# Patient Record
Sex: FEMALE | Race: OTHER | HISPANIC OR LATINO
[De-identification: names, ages, dates, MRNs, and addresses within clinical notes are randomized per-mention and may not be internally consistent; named-entity substitution may affect disease eponyms.]

---

## 2018-08-21 ENCOUNTER — APPOINTMENT (OUTPATIENT)
Dept: OBGYN | Facility: CLINIC | Age: 43
End: 2018-08-21
Payer: COMMERCIAL

## 2018-08-21 VITALS
BODY MASS INDEX: 43.02 KG/M2 | DIASTOLIC BLOOD PRESSURE: 85 MMHG | SYSTOLIC BLOOD PRESSURE: 141 MMHG | HEIGHT: 64 IN | WEIGHT: 252 LBS

## 2018-08-21 DIAGNOSIS — Z78.9 OTHER SPECIFIED HEALTH STATUS: ICD-10-CM

## 2018-08-21 PROBLEM — Z00.00 ENCOUNTER FOR PREVENTIVE HEALTH EXAMINATION: Status: ACTIVE | Noted: 2018-08-21

## 2018-08-21 PROCEDURE — 99244 OFF/OP CNSLTJ NEW/EST MOD 40: CPT

## 2018-10-02 ENCOUNTER — EMERGENCY (EMERGENCY)
Facility: HOSPITAL | Age: 43
LOS: 1 days | Discharge: HOME | End: 2018-10-02
Attending: EMERGENCY MEDICINE | Admitting: EMERGENCY MEDICINE
Payer: COMMERCIAL

## 2018-10-02 VITALS
HEART RATE: 99 BPM | DIASTOLIC BLOOD PRESSURE: 69 MMHG | OXYGEN SATURATION: 97 % | RESPIRATION RATE: 20 BRPM | TEMPERATURE: 98 F | SYSTOLIC BLOOD PRESSURE: 138 MMHG | WEIGHT: 251.99 LBS | HEIGHT: 64 IN

## 2018-10-02 VITALS
RESPIRATION RATE: 19 BRPM | OXYGEN SATURATION: 97 % | DIASTOLIC BLOOD PRESSURE: 78 MMHG | SYSTOLIC BLOOD PRESSURE: 130 MMHG | HEART RATE: 95 BPM

## 2018-10-02 DIAGNOSIS — M25.531 PAIN IN RIGHT WRIST: ICD-10-CM

## 2018-10-02 DIAGNOSIS — O99.89 OTHER SPECIFIED DISEASES AND CONDITIONS COMPLICATING PREGNANCY, CHILDBIRTH AND THE PUERPERIUM: ICD-10-CM

## 2018-10-02 DIAGNOSIS — Z79.4 LONG TERM (CURRENT) USE OF INSULIN: ICD-10-CM

## 2018-10-02 DIAGNOSIS — E11.9 TYPE 2 DIABETES MELLITUS WITHOUT COMPLICATIONS: ICD-10-CM

## 2018-10-02 DIAGNOSIS — M25.532 PAIN IN LEFT WRIST: ICD-10-CM

## 2018-10-02 DIAGNOSIS — E03.9 HYPOTHYROIDISM, UNSPECIFIED: ICD-10-CM

## 2018-10-02 DIAGNOSIS — Z3A.17 17 WEEKS GESTATION OF PREGNANCY: ICD-10-CM

## 2018-10-02 DIAGNOSIS — Z79.899 OTHER LONG TERM (CURRENT) DRUG THERAPY: ICD-10-CM

## 2018-10-02 PROCEDURE — 29125 APPL SHORT ARM SPLINT STATIC: CPT | Mod: LT

## 2018-10-02 PROCEDURE — 99282 EMERGENCY DEPT VISIT SF MDM: CPT

## 2018-10-02 PROCEDURE — 99284 EMERGENCY DEPT VISIT MOD MDM: CPT | Mod: 25

## 2018-10-02 NOTE — ED ADULT NURSE NOTE - NSIMPLEMENTINTERV_GEN_ALL_ED
Implemented All Universal Safety Interventions:  [place] to call system. Call bell, personal items and telephone within reach. Instruct patient to call for assistance. Room bathroom lighting operational. Non-slip footwear when patient is off stretcher. Physically safe environment: no spills, clutter or unnecessary equipment. Stretcher in lowest position, wheels locked, appropriate side rails in place.

## 2018-10-02 NOTE — ED ADULT NURSE REASSESSMENT NOTE - NS ED NURSE REASSESS COMMENT FT1
thumb spica applied to L arm and instructed to put the other (R) at hoem due to difficulty moving out if the R if put in placed. instructed about the application.

## 2018-10-02 NOTE — CONSULT NOTE ADULT - ASSESSMENT
42yo  at 17w with bilateral wrist pain. No obstetric issues at this time. Pt seen by hand surgeon who recommends kenalog and lidocaine injection. Kenalog is rated category D in first trimester due to increased risk for cleft lip development. However, is safe during this gestational age. Safe for discharge at this time from obstetrical standpoint.     Discussed with Dr. Houser.

## 2018-10-02 NOTE — CONSULT NOTE ADULT - SUBJECTIVE AND OBJECTIVE BOX
42yo  at 17 weeks with pregnancy c/b pregestational diabetes on insulin and hypothyroidism presenting to ED with bilateral wrist pain. Patient presented to ED after being seen by [place] MD and recommended to be seen in ED. Pain with movement. No precipitating symptoms. Denies VB, LOF, ctx.     On Levemir 62 AM/PM; humalog 30 TID with meals.  OBHX denies  PMH: hypothyroidism on synthroid 188mcg   PSH: open appendectomy at 17 years old   Meds: synthroid, insulin  All: NKDA    Vital Signs Last 24 Hrs  T(C): 36.9 (02 Oct 2018 20:58), Max: 36.9 (02 Oct 2018 20:36)  T(F): 98.5 (02 Oct 2018 20:58), Max: 98.5 (02 Oct 2018 20:58)  HR: 98 (02 Oct 2018 20:58) (98 - 99)  BP: 128/74 (02 Oct 2018 20:58) (128/74 - 138/69)  BP(mean): --  RR: 20 (02 Oct 2018 20:58) (20 - 20)  SpO2: 97% (02 Oct 2018 20:58) (97% - 97%)    Gen: resting comfortably   Ext: wrist tender to palpation and ROM   Abd: soft, nontender  TAUS: FHR 140bpm

## 2018-10-02 NOTE — ED PROVIDER NOTE - PHYSICAL EXAMINATION
bilateral + Finkelstein's tests no skin defect no warmth or erythema Compartments soft RUM nerves intact M/S cap refill brisk pulses intact

## 2018-10-02 NOTE — ED PROVIDER NOTE - MEDICAL DECISION MAKING DETAILS
gyn apprised + bilateral R>L DeQuervain's noted gyn apprised and with patient in ED + bilateral R>L DeQuervain's noted and Hand Sx with patient in ED

## 2018-10-02 NOTE — ED ADULT NURSE NOTE - CHIEF COMPLAINT QUOTE
RT wrist pain for 3 weeks , swelling noted for 3 days on both wrist. pt is 17 weeks pregnant . was referred From [facility] for evaluation. .pt denies trauma ,no injury,

## 2018-10-02 NOTE — ED PROVIDER NOTE - ATTENDING CONTRIBUTION TO CARE
I have seen and examined the pt, reviewed all pertinent clinical data, and I agree with the documentation/care/plan executed by MARYJO Mortensen.

## 2018-10-02 NOTE — ED ADULT TRIAGE NOTE - CHIEF COMPLAINT QUOTE
RT wrist pain for 3 weeks , swelling noted for 3 days on both wrist. pt is 17 weeks pregnant . was referred From [facility] for evaluation. .pt denies trauma ,no injury,

## 2018-10-02 NOTE — ED ADULT NURSE NOTE - OBJECTIVE STATEMENT
pt came from [facility] and sent here for further evaluation. R wrist x 3 weeks pain and L wrist x 2 days no redness but painful accdg to the pt. 17 weeks AOG . on insulin

## 2018-12-07 ENCOUNTER — APPOINTMENT (OUTPATIENT)
Dept: OBGYN | Facility: CLINIC | Age: 43
End: 2018-12-07
Payer: COMMERCIAL

## 2018-12-07 PROCEDURE — 99213 OFFICE O/P EST LOW 20 MIN: CPT

## 2018-12-09 ENCOUNTER — OUTPATIENT (OUTPATIENT)
Dept: OUTPATIENT SERVICES | Facility: HOSPITAL | Age: 43
LOS: 1 days | End: 2018-12-09
Payer: COMMERCIAL

## 2018-12-09 DIAGNOSIS — O26.899 OTHER SPECIFIED PREGNANCY RELATED CONDITIONS, UNSPECIFIED TRIMESTER: ICD-10-CM

## 2018-12-09 DIAGNOSIS — Z3A.00 WEEKS OF GESTATION OF PREGNANCY NOT SPECIFIED: ICD-10-CM

## 2018-12-09 PROBLEM — E03.9 HYPOTHYROIDISM, UNSPECIFIED: Chronic | Status: ACTIVE | Noted: 2018-10-02

## 2018-12-09 PROBLEM — O24.919 UNSPECIFIED DIABETES MELLITUS IN PREGNANCY, UNSPECIFIED TRIMESTER: Chronic | Status: ACTIVE | Noted: 2018-10-02

## 2018-12-09 LAB
ALBUMIN SERPL ELPH-MCNC: 3.6 G/DL — SIGNIFICANT CHANGE UP (ref 3.3–5)
ALP SERPL-CCNC: 32 U/L — LOW (ref 40–120)
ALT FLD-CCNC: 12 U/L — SIGNIFICANT CHANGE UP (ref 10–45)
ANION GAP SERPL CALC-SCNC: 14 MMOL/L — SIGNIFICANT CHANGE UP (ref 5–17)
APPEARANCE UR: CLEAR — SIGNIFICANT CHANGE UP
APTT BLD: 26.1 SEC — LOW (ref 27.5–36.3)
AST SERPL-CCNC: 14 U/L — SIGNIFICANT CHANGE UP (ref 10–40)
BASOPHILS NFR BLD AUTO: 0.2 % — SIGNIFICANT CHANGE UP (ref 0–2)
BILIRUB SERPL-MCNC: <0.2 MG/DL — SIGNIFICANT CHANGE UP (ref 0.2–1.2)
BILIRUB UR-MCNC: NEGATIVE — SIGNIFICANT CHANGE UP
BUN SERPL-MCNC: 9 MG/DL — SIGNIFICANT CHANGE UP (ref 7–23)
CALCIUM SERPL-MCNC: 9.1 MG/DL — SIGNIFICANT CHANGE UP (ref 8.4–10.5)
CHLORIDE SERPL-SCNC: 105 MMOL/L — SIGNIFICANT CHANGE UP (ref 96–108)
CO2 SERPL-SCNC: 22 MMOL/L — SIGNIFICANT CHANGE UP (ref 22–31)
COLLECT DURATION TIME UR: 24 HR — SIGNIFICANT CHANGE UP
COLOR SPEC: YELLOW — SIGNIFICANT CHANGE UP
CREAT SERPL-MCNC: 0.48 MG/DL — LOW (ref 0.5–1.3)
DIFF PNL FLD: NEGATIVE — SIGNIFICANT CHANGE UP
EOSINOPHIL NFR BLD AUTO: 1.3 % — SIGNIFICANT CHANGE UP (ref 0–6)
FIBRINOGEN PPP-MCNC: 485 MG/DL — HIGH (ref 258–438)
GLUCOSE BLDC GLUCOMTR-MCNC: 150 MG/DL — HIGH (ref 70–99)
GLUCOSE BLDC GLUCOMTR-MCNC: 60 MG/DL — LOW (ref 70–99)
GLUCOSE SERPL-MCNC: 72 MG/DL — SIGNIFICANT CHANGE UP (ref 70–99)
GLUCOSE UR QL: NEGATIVE — SIGNIFICANT CHANGE UP
HCT VFR BLD CALC: 33.4 % — LOW (ref 34.5–45)
HGB BLD-MCNC: 10.8 G/DL — LOW (ref 11.5–15.5)
INR BLD: 1.06 — SIGNIFICANT CHANGE UP (ref 0.88–1.16)
KETONES UR-MCNC: ABNORMAL MG/DL
LDH SERPL L TO P-CCNC: 170 U/L — SIGNIFICANT CHANGE UP (ref 50–242)
LEUKOCYTE ESTERASE UR-ACNC: ABNORMAL
LYMPHOCYTES # BLD AUTO: 18 % — SIGNIFICANT CHANGE UP (ref 13–44)
MCHC RBC-ENTMCNC: 28.9 PG — SIGNIFICANT CHANGE UP (ref 27–34)
MCHC RBC-ENTMCNC: 32.3 G/DL — SIGNIFICANT CHANGE UP (ref 32–36)
MCV RBC AUTO: 89.3 FL — SIGNIFICANT CHANGE UP (ref 80–100)
MONOCYTES NFR BLD AUTO: 8.2 % — SIGNIFICANT CHANGE UP (ref 2–14)
NEUTROPHILS NFR BLD AUTO: 72.3 % — SIGNIFICANT CHANGE UP (ref 43–77)
NITRITE UR-MCNC: NEGATIVE — SIGNIFICANT CHANGE UP
PH UR: 6 — SIGNIFICANT CHANGE UP (ref 5–8)
PLATELET # BLD AUTO: 239 K/UL — SIGNIFICANT CHANGE UP (ref 150–400)
POTASSIUM SERPL-MCNC: 4 MMOL/L — SIGNIFICANT CHANGE UP (ref 3.5–5.3)
POTASSIUM SERPL-SCNC: 4 MMOL/L — SIGNIFICANT CHANGE UP (ref 3.5–5.3)
PROT 24H UR-MRATE: 167 MG/24 H — HIGH (ref 50–100)
PROT SERPL-MCNC: 6.7 G/DL — SIGNIFICANT CHANGE UP (ref 6–8.3)
PROT UR-MCNC: NEGATIVE MG/DL — SIGNIFICANT CHANGE UP
PROTHROM AB SERPL-ACNC: 12 SEC — SIGNIFICANT CHANGE UP (ref 10–12.9)
RBC # BLD: 3.74 M/UL — LOW (ref 3.8–5.2)
RBC # FLD: 14.1 % — SIGNIFICANT CHANGE UP (ref 10.3–16.9)
SODIUM SERPL-SCNC: 141 MMOL/L — SIGNIFICANT CHANGE UP (ref 135–145)
SP GR SPEC: >=1.03 — SIGNIFICANT CHANGE UP (ref 1–1.03)
TOTAL VOLUME - 24 HOUR: 980 ML — SIGNIFICANT CHANGE UP
URATE SERPL-MCNC: 3.9 MG/DL — SIGNIFICANT CHANGE UP (ref 2.5–7)
URINE CREATININE CALCULATION: 1.4 G/24 H — SIGNIFICANT CHANGE UP (ref 0.8–1.8)
UROBILINOGEN FLD QL: 0.2 E.U./DL — SIGNIFICANT CHANGE UP
WBC # BLD: 12.7 K/UL — HIGH (ref 3.8–10.5)
WBC # FLD AUTO: 12.7 K/UL — HIGH (ref 3.8–10.5)

## 2018-12-09 PROCEDURE — 36415 COLL VENOUS BLD VENIPUNCTURE: CPT

## 2018-12-09 PROCEDURE — 99214 OFFICE O/P EST MOD 30 MIN: CPT

## 2018-12-09 PROCEDURE — 85025 COMPLETE CBC W/AUTO DIFF WBC: CPT

## 2018-12-09 PROCEDURE — 94760 N-INVAS EAR/PLS OXIMETRY 1: CPT

## 2018-12-09 PROCEDURE — 80053 COMPREHEN METABOLIC PANEL: CPT

## 2018-12-09 PROCEDURE — 76805 OB US >/= 14 WKS SNGL FETUS: CPT

## 2018-12-09 PROCEDURE — 84550 ASSAY OF BLOOD/URIC ACID: CPT

## 2018-12-09 PROCEDURE — 99232 SBSQ HOSP IP/OBS MODERATE 35: CPT

## 2018-12-09 PROCEDURE — 85610 PROTHROMBIN TIME: CPT

## 2018-12-09 PROCEDURE — 81001 URINALYSIS AUTO W/SCOPE: CPT

## 2018-12-09 PROCEDURE — 85384 FIBRINOGEN ACTIVITY: CPT

## 2018-12-09 PROCEDURE — 82962 GLUCOSE BLOOD TEST: CPT

## 2018-12-09 PROCEDURE — 59025 FETAL NON-STRESS TEST: CPT

## 2018-12-09 PROCEDURE — 85730 THROMBOPLASTIN TIME PARTIAL: CPT

## 2018-12-09 PROCEDURE — 83615 LACTATE (LD) (LDH) ENZYME: CPT

## 2018-12-09 PROCEDURE — 84156 ASSAY OF PROTEIN URINE: CPT

## 2018-12-11 DIAGNOSIS — O09.512 SUPERVISION OF ELDERLY PRIMIGRAVIDA, SECOND TRIMESTER: ICD-10-CM

## 2018-12-26 ENCOUNTER — APPOINTMENT (OUTPATIENT)
Dept: OBGYN | Facility: CLINIC | Age: 43
End: 2018-12-26
Payer: COMMERCIAL

## 2018-12-26 ENCOUNTER — APPOINTMENT (OUTPATIENT)
Dept: OBGYN | Facility: CLINIC | Age: 43
End: 2018-12-26

## 2018-12-26 PROCEDURE — 99213 OFFICE O/P EST LOW 20 MIN: CPT

## 2019-01-11 ENCOUNTER — APPOINTMENT (OUTPATIENT)
Dept: OBGYN | Facility: CLINIC | Age: 44
End: 2019-01-11
Payer: COMMERCIAL

## 2019-01-11 PROCEDURE — 99213 OFFICE O/P EST LOW 20 MIN: CPT

## 2019-02-15 ENCOUNTER — APPOINTMENT (OUTPATIENT)
Dept: OBGYN | Facility: CLINIC | Age: 44
End: 2019-02-15
Payer: COMMERCIAL

## 2019-02-15 VITALS
BODY MASS INDEX: 50.02 KG/M2 | HEIGHT: 64 IN | WEIGHT: 293 LBS | DIASTOLIC BLOOD PRESSURE: 76 MMHG | SYSTOLIC BLOOD PRESSURE: 144 MMHG

## 2019-02-15 PROCEDURE — 0502F SUBSEQUENT PRENATAL CARE: CPT

## 2019-02-22 ENCOUNTER — RESULT REVIEW (OUTPATIENT)
Age: 44
End: 2019-02-22

## 2019-02-22 ENCOUNTER — INPATIENT (INPATIENT)
Facility: HOSPITAL | Age: 44
LOS: 3 days | Discharge: HOME | End: 2019-02-26
Attending: OBSTETRICS & GYNECOLOGY | Admitting: OBSTETRICS & GYNECOLOGY
Payer: COMMERCIAL

## 2019-02-22 ENCOUNTER — APPOINTMENT (OUTPATIENT)
Dept: OBGYN | Facility: CLINIC | Age: 44
End: 2019-02-22

## 2019-02-22 VITALS — HEIGHT: 64 IN | WEIGHT: 293 LBS

## 2019-02-22 DIAGNOSIS — Z3A.00 WEEKS OF GESTATION OF PREGNANCY NOT SPECIFIED: ICD-10-CM

## 2019-02-22 DIAGNOSIS — O26.899 OTHER SPECIFIED PREGNANCY RELATED CONDITIONS, UNSPECIFIED TRIMESTER: ICD-10-CM

## 2019-02-22 LAB
ALBUMIN SERPL ELPH-MCNC: 3.3 G/DL — SIGNIFICANT CHANGE UP (ref 3.3–5)
ALP SERPL-CCNC: 67 U/L — SIGNIFICANT CHANGE UP (ref 40–120)
ALT FLD-CCNC: 22 U/L — SIGNIFICANT CHANGE UP (ref 10–45)
ANION GAP SERPL CALC-SCNC: 10 MMOL/L — SIGNIFICANT CHANGE UP (ref 5–17)
APPEARANCE UR: CLEAR — SIGNIFICANT CHANGE UP
APPEARANCE UR: CLEAR — SIGNIFICANT CHANGE UP
APTT BLD: 24.8 SEC — LOW (ref 27.5–36.3)
AST SERPL-CCNC: 21 U/L — SIGNIFICANT CHANGE UP (ref 10–40)
BACTERIA # UR AUTO: PRESENT /HPF
BASOPHILS # BLD AUTO: 0.03 K/UL — SIGNIFICANT CHANGE UP (ref 0–0.2)
BASOPHILS # BLD AUTO: 0.03 K/UL — SIGNIFICANT CHANGE UP (ref 0–0.2)
BASOPHILS NFR BLD AUTO: 0.2 % — SIGNIFICANT CHANGE UP (ref 0–2)
BASOPHILS NFR BLD AUTO: 0.2 % — SIGNIFICANT CHANGE UP (ref 0–2)
BILIRUB SERPL-MCNC: 0.2 MG/DL — SIGNIFICANT CHANGE UP (ref 0.2–1.2)
BILIRUB UR-MCNC: NEGATIVE — SIGNIFICANT CHANGE UP
BILIRUB UR-MCNC: NEGATIVE — SIGNIFICANT CHANGE UP
BLD GP AB SCN SERPL QL: NEGATIVE — SIGNIFICANT CHANGE UP
BUN SERPL-MCNC: 6 MG/DL — LOW (ref 7–23)
CALCIUM SERPL-MCNC: 9.2 MG/DL — SIGNIFICANT CHANGE UP (ref 8.4–10.5)
CHLORIDE SERPL-SCNC: 103 MMOL/L — SIGNIFICANT CHANGE UP (ref 96–108)
CO2 SERPL-SCNC: 25 MMOL/L — SIGNIFICANT CHANGE UP (ref 22–31)
COLOR SPEC: YELLOW — SIGNIFICANT CHANGE UP
COLOR SPEC: YELLOW — SIGNIFICANT CHANGE UP
CREAT ?TM UR-MCNC: 84 MG/DL — SIGNIFICANT CHANGE UP
CREAT SERPL-MCNC: 0.53 MG/DL — SIGNIFICANT CHANGE UP (ref 0.5–1.3)
DIFF PNL FLD: ABNORMAL
DIFF PNL FLD: ABNORMAL
EOSINOPHIL # BLD AUTO: 0.03 K/UL — SIGNIFICANT CHANGE UP (ref 0–0.5)
EOSINOPHIL # BLD AUTO: 0.13 K/UL — SIGNIFICANT CHANGE UP (ref 0–0.5)
EOSINOPHIL NFR BLD AUTO: 0.2 % — SIGNIFICANT CHANGE UP (ref 0–6)
EOSINOPHIL NFR BLD AUTO: 0.9 % — SIGNIFICANT CHANGE UP (ref 0–6)
EPI CELLS # UR: SIGNIFICANT CHANGE UP /HPF (ref 0–5)
FIBRINOGEN PPP-MCNC: 600 MG/DL — HIGH (ref 258–438)
GLUCOSE BLDC GLUCOMTR-MCNC: 113 MG/DL — HIGH (ref 70–99)
GLUCOSE BLDC GLUCOMTR-MCNC: 123 MG/DL — HIGH (ref 70–99)
GLUCOSE BLDC GLUCOMTR-MCNC: 127 MG/DL — HIGH (ref 70–99)
GLUCOSE BLDC GLUCOMTR-MCNC: 91 MG/DL — SIGNIFICANT CHANGE UP (ref 70–99)
GLUCOSE SERPL-MCNC: 120 MG/DL — HIGH (ref 70–99)
GLUCOSE UR QL: NEGATIVE — SIGNIFICANT CHANGE UP
GLUCOSE UR QL: NEGATIVE — SIGNIFICANT CHANGE UP
HBV SURFACE AG SER-ACNC: SIGNIFICANT CHANGE UP
HCT VFR BLD CALC: 35.4 % — SIGNIFICANT CHANGE UP (ref 34.5–45)
HCT VFR BLD CALC: 37.6 % — SIGNIFICANT CHANGE UP (ref 34.5–45)
HGB BLD-MCNC: 11.3 G/DL — LOW (ref 11.5–15.5)
HGB BLD-MCNC: 12.1 G/DL — SIGNIFICANT CHANGE UP (ref 11.5–15.5)
HIV 1+2 AB+HIV1 P24 AG SERPL QL IA: SIGNIFICANT CHANGE UP
HYALINE CASTS # UR AUTO: ABNORMAL /LPF (ref 0–2)
IMM GRANULOCYTES NFR BLD AUTO: 0.5 % — SIGNIFICANT CHANGE UP (ref 0–1.5)
IMM GRANULOCYTES NFR BLD AUTO: 0.6 % — SIGNIFICANT CHANGE UP (ref 0–1.5)
INR BLD: 0.98 — SIGNIFICANT CHANGE UP (ref 0.88–1.16)
KETONES UR-MCNC: 40 MG/DL
KETONES UR-MCNC: NEGATIVE — SIGNIFICANT CHANGE UP
LDH SERPL L TO P-CCNC: 256 U/L — HIGH (ref 50–242)
LEUKOCYTE ESTERASE UR-ACNC: NEGATIVE — SIGNIFICANT CHANGE UP
LEUKOCYTE ESTERASE UR-ACNC: NEGATIVE — SIGNIFICANT CHANGE UP
LYMPHOCYTES # BLD AUTO: 1.69 K/UL — SIGNIFICANT CHANGE UP (ref 1–3.3)
LYMPHOCYTES # BLD AUTO: 11 % — LOW (ref 13–44)
LYMPHOCYTES # BLD AUTO: 17.1 % — SIGNIFICANT CHANGE UP (ref 13–44)
LYMPHOCYTES # BLD AUTO: 2.39 K/UL — SIGNIFICANT CHANGE UP (ref 1–3.3)
MCHC RBC-ENTMCNC: 28.8 PG — SIGNIFICANT CHANGE UP (ref 27–34)
MCHC RBC-ENTMCNC: 28.8 PG — SIGNIFICANT CHANGE UP (ref 27–34)
MCHC RBC-ENTMCNC: 31.9 GM/DL — LOW (ref 32–36)
MCHC RBC-ENTMCNC: 32.2 GM/DL — SIGNIFICANT CHANGE UP (ref 32–36)
MCV RBC AUTO: 89.5 FL — SIGNIFICANT CHANGE UP (ref 80–100)
MCV RBC AUTO: 90.1 FL — SIGNIFICANT CHANGE UP (ref 80–100)
MONOCYTES # BLD AUTO: 1.2 K/UL — HIGH (ref 0–0.9)
MONOCYTES # BLD AUTO: 1.31 K/UL — HIGH (ref 0–0.9)
MONOCYTES NFR BLD AUTO: 7.8 % — SIGNIFICANT CHANGE UP (ref 2–14)
MONOCYTES NFR BLD AUTO: 9.4 % — SIGNIFICANT CHANGE UP (ref 2–14)
NEUTROPHILS # BLD AUTO: 10.03 K/UL — HIGH (ref 1.8–7.4)
NEUTROPHILS # BLD AUTO: 12.31 K/UL — HIGH (ref 1.8–7.4)
NEUTROPHILS NFR BLD AUTO: 71.8 % — SIGNIFICANT CHANGE UP (ref 43–77)
NEUTROPHILS NFR BLD AUTO: 80.3 % — HIGH (ref 43–77)
NITRITE UR-MCNC: NEGATIVE — SIGNIFICANT CHANGE UP
NITRITE UR-MCNC: NEGATIVE — SIGNIFICANT CHANGE UP
NRBC # BLD: 0 /100 WBCS — SIGNIFICANT CHANGE UP (ref 0–0)
NRBC # BLD: 0 /100 WBCS — SIGNIFICANT CHANGE UP (ref 0–0)
PH UR: 6.5 — SIGNIFICANT CHANGE UP (ref 5–8)
PH UR: 6.5 — SIGNIFICANT CHANGE UP (ref 5–8)
PLATELET # BLD AUTO: 214 K/UL — SIGNIFICANT CHANGE UP (ref 150–400)
PLATELET # BLD AUTO: 243 K/UL — SIGNIFICANT CHANGE UP (ref 150–400)
POTASSIUM SERPL-MCNC: 3.6 MMOL/L — SIGNIFICANT CHANGE UP (ref 3.5–5.3)
POTASSIUM SERPL-SCNC: 3.6 MMOL/L — SIGNIFICANT CHANGE UP (ref 3.5–5.3)
PROT ?TM UR-MCNC: 153 MG/DL — HIGH (ref 0–12)
PROT SERPL-MCNC: 7.1 G/DL — SIGNIFICANT CHANGE UP (ref 6–8.3)
PROT UR-MCNC: 100 MG/DL
PROT UR-MCNC: NEGATIVE MG/DL — SIGNIFICANT CHANGE UP
PROT/CREAT UR-RTO: 1.8 RATIO — HIGH (ref 0–0.2)
PROTHROM AB SERPL-ACNC: 11.1 SEC — SIGNIFICANT CHANGE UP (ref 10–12.9)
RBC # BLD: 3.93 M/UL — SIGNIFICANT CHANGE UP (ref 3.8–5.2)
RBC # BLD: 4.2 M/UL — SIGNIFICANT CHANGE UP (ref 3.8–5.2)
RBC # FLD: 14.4 % — SIGNIFICANT CHANGE UP (ref 10.3–14.5)
RBC # FLD: 14.4 % — SIGNIFICANT CHANGE UP (ref 10.3–14.5)
RBC CASTS # UR COMP ASSIST: > 10 /HPF
RH IG SCN BLD-IMP: POSITIVE — SIGNIFICANT CHANGE UP
SODIUM SERPL-SCNC: 138 MMOL/L — SIGNIFICANT CHANGE UP (ref 135–145)
SP GR SPEC: 1.02 — SIGNIFICANT CHANGE UP (ref 1–1.03)
SP GR SPEC: 1.02 — SIGNIFICANT CHANGE UP (ref 1–1.03)
URATE SERPL-MCNC: 3.6 MG/DL — SIGNIFICANT CHANGE UP (ref 2.5–7)
UROBILINOGEN FLD QL: 0.2 E.U./DL — SIGNIFICANT CHANGE UP
UROBILINOGEN FLD QL: 0.2 E.U./DL — SIGNIFICANT CHANGE UP
WBC # BLD: 13.97 K/UL — HIGH (ref 3.8–10.5)
WBC # BLD: 15.34 K/UL — HIGH (ref 3.8–10.5)
WBC # FLD AUTO: 13.97 K/UL — HIGH (ref 3.8–10.5)
WBC # FLD AUTO: 15.34 K/UL — HIGH (ref 3.8–10.5)
WBC UR QL: < 5 /HPF — SIGNIFICANT CHANGE UP

## 2019-02-22 PROCEDURE — 59514 CESAREAN DELIVERY ONLY: CPT | Mod: U9

## 2019-02-22 PROCEDURE — 99232 SBSQ HOSP IP/OBS MODERATE 35: CPT

## 2019-02-22 RX ORDER — SODIUM CHLORIDE 9 MG/ML
1000 INJECTION, SOLUTION INTRAVENOUS ONCE
Qty: 0 | Refills: 0 | Status: DISCONTINUED | OUTPATIENT
Start: 2019-02-22 | End: 2019-02-22

## 2019-02-22 RX ORDER — OXYTOCIN 10 UNIT/ML
333.33 VIAL (ML) INJECTION
Qty: 20 | Refills: 0 | Status: DISCONTINUED | OUTPATIENT
Start: 2019-02-22 | End: 2019-02-26

## 2019-02-22 RX ORDER — SODIUM CHLORIDE 9 MG/ML
1000 INJECTION INTRAMUSCULAR; INTRAVENOUS; SUBCUTANEOUS
Qty: 0 | Refills: 0 | Status: DISCONTINUED | OUTPATIENT
Start: 2019-02-22 | End: 2019-02-23

## 2019-02-22 RX ORDER — INSULIN LISPRO 100/ML
VIAL (ML) SUBCUTANEOUS
Qty: 0 | Refills: 0 | Status: DISCONTINUED | OUTPATIENT
Start: 2019-02-22 | End: 2019-02-26

## 2019-02-22 RX ORDER — CITRIC ACID/SODIUM CITRATE 300-500 MG
15 SOLUTION, ORAL ORAL ONCE
Qty: 0 | Refills: 0 | Status: DISCONTINUED | OUTPATIENT
Start: 2019-02-22 | End: 2019-02-22

## 2019-02-22 RX ORDER — DEXTROSE 50 % IN WATER 50 %
50 SYRINGE (ML) INTRAVENOUS ONCE
Qty: 0 | Refills: 0 | Status: DISCONTINUED | OUTPATIENT
Start: 2019-02-22 | End: 2019-02-22

## 2019-02-22 RX ORDER — FERROUS SULFATE 325(65) MG
325 TABLET ORAL DAILY
Qty: 0 | Refills: 0 | Status: DISCONTINUED | OUTPATIENT
Start: 2019-02-22 | End: 2019-02-26

## 2019-02-22 RX ORDER — OXYCODONE AND ACETAMINOPHEN 5; 325 MG/1; MG/1
2 TABLET ORAL EVERY 6 HOURS
Qty: 0 | Refills: 0 | Status: DISCONTINUED | OUTPATIENT
Start: 2019-02-22 | End: 2019-02-26

## 2019-02-22 RX ORDER — SODIUM CHLORIDE 9 MG/ML
1000 INJECTION, SOLUTION INTRAVENOUS
Qty: 0 | Refills: 0 | Status: DISCONTINUED | OUTPATIENT
Start: 2019-02-22 | End: 2019-02-23

## 2019-02-22 RX ORDER — HYDRALAZINE HCL 50 MG
10 TABLET ORAL ONCE
Qty: 0 | Refills: 0 | Status: COMPLETED | OUTPATIENT
Start: 2019-02-22 | End: 2019-02-22

## 2019-02-22 RX ORDER — CITRIC ACID/SODIUM CITRATE 300-500 MG
30 SOLUTION, ORAL ORAL ONCE
Qty: 0 | Refills: 0 | Status: COMPLETED | OUTPATIENT
Start: 2019-02-22 | End: 2019-02-22

## 2019-02-22 RX ORDER — TETANUS TOXOID, REDUCED DIPHTHERIA TOXOID AND ACELLULAR PERTUSSIS VACCINE, ADSORBED 5; 2.5; 8; 8; 2.5 [IU]/.5ML; [IU]/.5ML; UG/.5ML; UG/.5ML; UG/.5ML
0.5 SUSPENSION INTRAMUSCULAR ONCE
Qty: 0 | Refills: 0 | Status: COMPLETED | OUTPATIENT
Start: 2019-02-22 | End: 2020-01-21

## 2019-02-22 RX ORDER — MAGNESIUM SULFATE 500 MG/ML
4 VIAL (ML) INJECTION ONCE
Qty: 0 | Refills: 0 | Status: COMPLETED | OUTPATIENT
Start: 2019-02-22 | End: 2019-02-22

## 2019-02-22 RX ORDER — OXYCODONE AND ACETAMINOPHEN 5; 325 MG/1; MG/1
1 TABLET ORAL
Qty: 0 | Refills: 0 | Status: DISCONTINUED | OUTPATIENT
Start: 2019-02-22 | End: 2019-02-26

## 2019-02-22 RX ORDER — HEPARIN SODIUM 5000 [USP'U]/ML
7500 INJECTION INTRAVENOUS; SUBCUTANEOUS EVERY 8 HOURS
Qty: 0 | Refills: 0 | Status: DISCONTINUED | OUTPATIENT
Start: 2019-02-23 | End: 2019-02-26

## 2019-02-22 RX ORDER — IBUPROFEN 200 MG
600 TABLET ORAL EVERY 6 HOURS
Qty: 0 | Refills: 0 | Status: DISCONTINUED | OUTPATIENT
Start: 2019-02-22 | End: 2019-02-26

## 2019-02-22 RX ORDER — SIMETHICONE 80 MG/1
80 TABLET, CHEWABLE ORAL EVERY 4 HOURS
Qty: 0 | Refills: 0 | Status: DISCONTINUED | OUTPATIENT
Start: 2019-02-22 | End: 2019-02-26

## 2019-02-22 RX ORDER — SODIUM CHLORIDE 9 MG/ML
1000 INJECTION, SOLUTION INTRAVENOUS
Qty: 0 | Refills: 0 | Status: DISCONTINUED | OUTPATIENT
Start: 2019-02-22 | End: 2019-02-22

## 2019-02-22 RX ORDER — OXYTOCIN 10 UNIT/ML
333.33 VIAL (ML) INJECTION
Qty: 20 | Refills: 0 | Status: DISCONTINUED | OUTPATIENT
Start: 2019-02-22 | End: 2019-02-22

## 2019-02-22 RX ORDER — METOCLOPRAMIDE HCL 10 MG
10 TABLET ORAL ONCE
Qty: 0 | Refills: 0 | Status: DISCONTINUED | OUTPATIENT
Start: 2019-02-22 | End: 2019-02-22

## 2019-02-22 RX ORDER — DEXTROSE 50 % IN WATER 50 %
15 SYRINGE (ML) INTRAVENOUS ONCE
Qty: 0 | Refills: 0 | Status: DISCONTINUED | OUTPATIENT
Start: 2019-02-22 | End: 2019-02-26

## 2019-02-22 RX ORDER — CEFAZOLIN SODIUM 1 G
3000 VIAL (EA) INJECTION ONCE
Qty: 0 | Refills: 0 | Status: COMPLETED | OUTPATIENT
Start: 2019-02-22 | End: 2019-02-22

## 2019-02-22 RX ORDER — DIPHENHYDRAMINE HCL 50 MG
25 CAPSULE ORAL EVERY 6 HOURS
Qty: 0 | Refills: 0 | Status: DISCONTINUED | OUTPATIENT
Start: 2019-02-22 | End: 2019-02-26

## 2019-02-22 RX ORDER — INSULIN HUMAN 100 [IU]/ML
1 INJECTION, SOLUTION SUBCUTANEOUS
Qty: 50 | Refills: 0 | Status: DISCONTINUED | OUTPATIENT
Start: 2019-02-22 | End: 2019-02-22

## 2019-02-22 RX ORDER — GLYCERIN ADULT
1 SUPPOSITORY, RECTAL RECTAL AT BEDTIME
Qty: 0 | Refills: 0 | Status: DISCONTINUED | OUTPATIENT
Start: 2019-02-22 | End: 2019-02-26

## 2019-02-22 RX ORDER — OXYTOCIN 10 UNIT/ML
41.67 VIAL (ML) INJECTION
Qty: 20 | Refills: 0 | Status: DISCONTINUED | OUTPATIENT
Start: 2019-02-22 | End: 2019-02-26

## 2019-02-22 RX ORDER — DOCUSATE SODIUM 100 MG
100 CAPSULE ORAL
Qty: 0 | Refills: 0 | Status: DISCONTINUED | OUTPATIENT
Start: 2019-02-22 | End: 2019-02-26

## 2019-02-22 RX ORDER — NIFEDIPINE 30 MG
10 TABLET, EXTENDED RELEASE 24 HR ORAL ONCE
Qty: 0 | Refills: 0 | Status: COMPLETED | OUTPATIENT
Start: 2019-02-22 | End: 2019-02-22

## 2019-02-22 RX ORDER — TRANEXAMIC ACID 100 MG/ML
1000 INJECTION, SOLUTION INTRAVENOUS ONCE
Qty: 0 | Refills: 0 | Status: DISCONTINUED | OUTPATIENT
Start: 2019-02-22 | End: 2019-02-22

## 2019-02-22 RX ORDER — LABETALOL HCL 100 MG
20 TABLET ORAL ONCE
Qty: 0 | Refills: 0 | Status: COMPLETED | OUTPATIENT
Start: 2019-02-22 | End: 2019-02-22

## 2019-02-22 RX ORDER — LANOLIN
1 OINTMENT (GRAM) TOPICAL
Qty: 0 | Refills: 0 | Status: DISCONTINUED | OUTPATIENT
Start: 2019-02-22 | End: 2019-02-26

## 2019-02-22 RX ORDER — MAGNESIUM SULFATE 500 MG/ML
2 VIAL (ML) INJECTION ONCE
Qty: 40 | Refills: 0 | Status: COMPLETED | OUTPATIENT
Start: 2019-02-22 | End: 2019-02-22

## 2019-02-22 RX ORDER — ONDANSETRON 8 MG/1
4 TABLET, FILM COATED ORAL EVERY 6 HOURS
Qty: 0 | Refills: 0 | Status: DISCONTINUED | OUTPATIENT
Start: 2019-02-22 | End: 2019-02-26

## 2019-02-22 RX ORDER — NALOXONE HYDROCHLORIDE 4 MG/.1ML
0.1 SPRAY NASAL
Qty: 0 | Refills: 0 | Status: DISCONTINUED | OUTPATIENT
Start: 2019-02-22 | End: 2019-02-26

## 2019-02-22 RX ORDER — MAGNESIUM SULFATE 500 MG/ML
2 VIAL (ML) INJECTION
Qty: 40 | Refills: 0 | Status: DISCONTINUED | OUTPATIENT
Start: 2019-02-22 | End: 2019-02-23

## 2019-02-22 RX ADMIN — Medication 10 MILLIGRAM(S): at 14:30

## 2019-02-22 RX ADMIN — SODIUM CHLORIDE 2000 MILLILITER(S): 9 INJECTION, SOLUTION INTRAVENOUS at 17:29

## 2019-02-22 RX ADMIN — Medication 200 MILLIGRAM(S): at 17:41

## 2019-02-22 RX ADMIN — Medication 10 MILLIGRAM(S): at 16:34

## 2019-02-22 RX ADMIN — Medication 10 MILLIGRAM(S): at 17:05

## 2019-02-22 RX ADMIN — Medication 50 GM/HR: at 16:54

## 2019-02-22 RX ADMIN — Medication 30 MILLILITER(S): at 17:41

## 2019-02-22 RX ADMIN — Medication 300 GRAM(S): at 16:31

## 2019-02-22 RX ADMIN — Medication 20 MILLIGRAM(S): at 23:43

## 2019-02-23 DIAGNOSIS — O14.90 UNSPECIFIED PRE-ECLAMPSIA, UNSPECIFIED TRIMESTER: ICD-10-CM

## 2019-02-23 LAB
ALBUMIN SERPL ELPH-MCNC: 2.7 G/DL — LOW (ref 3.3–5)
ALBUMIN SERPL ELPH-MCNC: 2.9 G/DL — LOW (ref 3.3–5)
ALP SERPL-CCNC: 58 U/L — SIGNIFICANT CHANGE UP (ref 40–120)
ALP SERPL-CCNC: 64 U/L — SIGNIFICANT CHANGE UP (ref 40–120)
ALT FLD-CCNC: 20 U/L — SIGNIFICANT CHANGE UP (ref 10–45)
ALT FLD-CCNC: 20 U/L — SIGNIFICANT CHANGE UP (ref 10–45)
ANION GAP SERPL CALC-SCNC: 11 MMOL/L — SIGNIFICANT CHANGE UP (ref 5–17)
ANION GAP SERPL CALC-SCNC: 12 MMOL/L — SIGNIFICANT CHANGE UP (ref 5–17)
APTT BLD: 24.8 SEC — LOW (ref 27.5–36.3)
APTT BLD: 25.7 SEC — LOW (ref 27.5–36.3)
AST SERPL-CCNC: 30 U/L — SIGNIFICANT CHANGE UP (ref 10–40)
AST SERPL-CCNC: 40 U/L — SIGNIFICANT CHANGE UP (ref 10–40)
BASOPHILS # BLD AUTO: 0 K/UL — SIGNIFICANT CHANGE UP (ref 0–0.2)
BASOPHILS NFR BLD AUTO: 0 % — SIGNIFICANT CHANGE UP (ref 0–2)
BILIRUB SERPL-MCNC: 0.2 MG/DL — SIGNIFICANT CHANGE UP (ref 0.2–1.2)
BILIRUB SERPL-MCNC: 0.2 MG/DL — SIGNIFICANT CHANGE UP (ref 0.2–1.2)
BUN SERPL-MCNC: 5 MG/DL — LOW (ref 7–23)
BUN SERPL-MCNC: 6 MG/DL — LOW (ref 7–23)
CALCIUM SERPL-MCNC: 7.4 MG/DL — LOW (ref 8.4–10.5)
CALCIUM SERPL-MCNC: 7.9 MG/DL — LOW (ref 8.4–10.5)
CHLORIDE SERPL-SCNC: 101 MMOL/L — SIGNIFICANT CHANGE UP (ref 96–108)
CHLORIDE SERPL-SCNC: 104 MMOL/L — SIGNIFICANT CHANGE UP (ref 96–108)
CO2 SERPL-SCNC: 22 MMOL/L — SIGNIFICANT CHANGE UP (ref 22–31)
CO2 SERPL-SCNC: 24 MMOL/L — SIGNIFICANT CHANGE UP (ref 22–31)
CREAT SERPL-MCNC: 0.45 MG/DL — LOW (ref 0.5–1.3)
CREAT SERPL-MCNC: 0.54 MG/DL — SIGNIFICANT CHANGE UP (ref 0.5–1.3)
EOSINOPHIL # BLD AUTO: 0 K/UL — SIGNIFICANT CHANGE UP (ref 0–0.5)
EOSINOPHIL NFR BLD AUTO: 0 % — SIGNIFICANT CHANGE UP (ref 0–6)
FIBRINOGEN PPP-MCNC: 445 MG/DL — HIGH (ref 258–438)
FIBRINOGEN PPP-MCNC: 500 MG/DL — HIGH (ref 258–438)
GLUCOSE BLDC GLUCOMTR-MCNC: 138 MG/DL — HIGH (ref 70–99)
GLUCOSE BLDC GLUCOMTR-MCNC: 141 MG/DL — HIGH (ref 70–99)
GLUCOSE BLDC GLUCOMTR-MCNC: 142 MG/DL — HIGH (ref 70–99)
GLUCOSE BLDC GLUCOMTR-MCNC: 145 MG/DL — HIGH (ref 70–99)
GLUCOSE BLDC GLUCOMTR-MCNC: 145 MG/DL — HIGH (ref 70–99)
GLUCOSE BLDC GLUCOMTR-MCNC: 207 MG/DL — HIGH (ref 70–99)
GLUCOSE SERPL-MCNC: 140 MG/DL — HIGH (ref 70–99)
GLUCOSE SERPL-MCNC: 148 MG/DL — HIGH (ref 70–99)
HCT VFR BLD CALC: 31.6 % — LOW (ref 34.5–45)
HGB BLD-MCNC: 9.9 G/DL — LOW (ref 11.5–15.5)
INR BLD: 0.97 — SIGNIFICANT CHANGE UP (ref 0.88–1.16)
INR BLD: 0.97 — SIGNIFICANT CHANGE UP (ref 0.88–1.16)
LDH SERPL L TO P-CCNC: 358 U/L — HIGH (ref 50–242)
LDH SERPL L TO P-CCNC: 399 U/L — HIGH (ref 50–242)
LYMPHOCYTES # BLD AUTO: 0.97 K/UL — LOW (ref 1–3.3)
LYMPHOCYTES # BLD AUTO: 6.1 % — LOW (ref 13–44)
MAGNESIUM SERPL-MCNC: 3.3 MG/DL — HIGH (ref 1.6–2.6)
MAGNESIUM SERPL-MCNC: 4.2 MG/DL — HIGH (ref 1.6–2.6)
MAGNESIUM SERPL-MCNC: 5.8 MG/DL — HIGH (ref 1.6–2.6)
MAGNESIUM SERPL-MCNC: 5.9 MG/DL — HIGH (ref 1.6–2.6)
MCHC RBC-ENTMCNC: 28.8 PG — SIGNIFICANT CHANGE UP (ref 27–34)
MCHC RBC-ENTMCNC: 31.3 GM/DL — LOW (ref 32–36)
MCV RBC AUTO: 91.9 FL — SIGNIFICANT CHANGE UP (ref 80–100)
MONOCYTES # BLD AUTO: 1.38 K/UL — HIGH (ref 0–0.9)
MONOCYTES NFR BLD AUTO: 8.7 % — SIGNIFICANT CHANGE UP (ref 2–14)
NEUTROPHILS # BLD AUTO: 13.55 K/UL — HIGH (ref 1.8–7.4)
NEUTROPHILS NFR BLD AUTO: 85.2 % — HIGH (ref 43–77)
PLATELET # BLD AUTO: 219 K/UL — SIGNIFICANT CHANGE UP (ref 150–400)
POTASSIUM SERPL-MCNC: 3.4 MMOL/L — LOW (ref 3.5–5.3)
POTASSIUM SERPL-MCNC: 4.1 MMOL/L — SIGNIFICANT CHANGE UP (ref 3.5–5.3)
POTASSIUM SERPL-SCNC: 3.4 MMOL/L — LOW (ref 3.5–5.3)
POTASSIUM SERPL-SCNC: 4.1 MMOL/L — SIGNIFICANT CHANGE UP (ref 3.5–5.3)
PROT SERPL-MCNC: 5.6 G/DL — LOW (ref 6–8.3)
PROT SERPL-MCNC: 5.9 G/DL — LOW (ref 6–8.3)
PROTHROM AB SERPL-ACNC: 10.9 SEC — SIGNIFICANT CHANGE UP (ref 10–12.9)
PROTHROM AB SERPL-ACNC: 10.9 SEC — SIGNIFICANT CHANGE UP (ref 10–12.9)
RBC # BLD: 3.44 M/UL — LOW (ref 3.8–5.2)
RBC # FLD: 14.8 % — HIGH (ref 10.3–14.5)
SODIUM SERPL-SCNC: 136 MMOL/L — SIGNIFICANT CHANGE UP (ref 135–145)
SODIUM SERPL-SCNC: 138 MMOL/L — SIGNIFICANT CHANGE UP (ref 135–145)
T PALLIDUM AB TITR SER: NEGATIVE — SIGNIFICANT CHANGE UP
URATE SERPL-MCNC: 3.6 MG/DL — SIGNIFICANT CHANGE UP (ref 2.5–7)
URATE SERPL-MCNC: 4.1 MG/DL — SIGNIFICANT CHANGE UP (ref 2.5–7)
WBC # BLD: 15.9 K/UL — HIGH (ref 3.8–10.5)
WBC # FLD AUTO: 15.9 K/UL — HIGH (ref 3.8–10.5)

## 2019-02-23 PROCEDURE — 93306 TTE W/DOPPLER COMPLETE: CPT | Mod: 26

## 2019-02-23 PROCEDURE — 71045 X-RAY EXAM CHEST 1 VIEW: CPT | Mod: 26

## 2019-02-23 PROCEDURE — 99221 1ST HOSP IP/OBS SF/LOW 40: CPT

## 2019-02-23 RX ORDER — LABETALOL HCL 100 MG
40 TABLET ORAL ONCE
Qty: 0 | Refills: 0 | Status: COMPLETED | OUTPATIENT
Start: 2019-02-23 | End: 2019-02-23

## 2019-02-23 RX ORDER — LEVOTHYROXINE SODIUM 125 MCG
175 TABLET ORAL DAILY
Qty: 0 | Refills: 0 | Status: DISCONTINUED | OUTPATIENT
Start: 2019-02-23 | End: 2019-02-26

## 2019-02-23 RX ORDER — ACETAMINOPHEN 500 MG
1000 TABLET ORAL ONCE
Qty: 0 | Refills: 0 | Status: COMPLETED | OUTPATIENT
Start: 2019-02-23 | End: 2019-02-23

## 2019-02-23 RX ORDER — LABETALOL HCL 100 MG
200 TABLET ORAL
Qty: 0 | Refills: 0 | Status: DISCONTINUED | OUTPATIENT
Start: 2019-02-23 | End: 2019-02-23

## 2019-02-23 RX ORDER — MAGNESIUM SULFATE 500 MG/ML
2.5 VIAL (ML) INJECTION
Qty: 40 | Refills: 0 | Status: DISCONTINUED | OUTPATIENT
Start: 2019-02-23 | End: 2019-02-23

## 2019-02-23 RX ORDER — LABETALOL HCL 100 MG
300 TABLET ORAL THREE TIMES A DAY
Qty: 0 | Refills: 0 | Status: DISCONTINUED | OUTPATIENT
Start: 2019-02-23 | End: 2019-02-24

## 2019-02-23 RX ORDER — NIFEDIPINE 30 MG
10 TABLET, EXTENDED RELEASE 24 HR ORAL ONCE
Qty: 0 | Refills: 0 | Status: COMPLETED | OUTPATIENT
Start: 2019-02-23 | End: 2019-02-23

## 2019-02-23 RX ORDER — LABETALOL HCL 100 MG
80 TABLET ORAL ONCE
Qty: 0 | Refills: 0 | Status: COMPLETED | OUTPATIENT
Start: 2019-02-23 | End: 2019-02-23

## 2019-02-23 RX ADMIN — HEPARIN SODIUM 7500 UNIT(S): 5000 INJECTION INTRAVENOUS; SUBCUTANEOUS at 08:39

## 2019-02-23 RX ADMIN — Medication 40 MILLIGRAM(S): at 00:10

## 2019-02-23 RX ADMIN — HEPARIN SODIUM 7500 UNIT(S): 5000 INJECTION INTRAVENOUS; SUBCUTANEOUS at 23:40

## 2019-02-23 RX ADMIN — Medication 400 MILLIGRAM(S): at 23:30

## 2019-02-23 RX ADMIN — Medication 300 MILLIGRAM(S): at 17:55

## 2019-02-23 RX ADMIN — Medication 80 MILLIGRAM(S): at 04:40

## 2019-02-23 RX ADMIN — Medication 175 MICROGRAM(S): at 17:55

## 2019-02-23 RX ADMIN — HEPARIN SODIUM 7500 UNIT(S): 5000 INJECTION INTRAVENOUS; SUBCUTANEOUS at 16:17

## 2019-02-23 RX ADMIN — SODIUM CHLORIDE 75 MILLILITER(S): 9 INJECTION, SOLUTION INTRAVENOUS at 17:04

## 2019-02-23 RX ADMIN — Medication 10 MILLIGRAM(S): at 00:10

## 2019-02-23 RX ADMIN — Medication 200 MILLIGRAM(S): at 06:15

## 2019-02-23 RX ADMIN — Medication 2: at 23:30

## 2019-02-23 RX ADMIN — Medication 62.5 GM/HR: at 09:00

## 2019-02-23 NOTE — CONSULT NOTE ADULT - SUBJECTIVE AND OBJECTIVE BOX
Patient is a 43y old  Female who presents with a chief complaint of postpartum (2019 05:25)      HPI:  42 y/o F w PMHx of gestational diabetes and previous pre-diabetes was admitted to [facility] on 2019 for an emergent  at 37 weeks when she was found to be hypertensive on a routine visit.     Her only home medications are synthroid, Levamir and a short acting insulin.   She delivered at 7 pm yesterday. Since then she was found to have severe proteinuria (urine protein to creatinine ratio 1.8, LFTs normal, plts normal) and was also hypertensive with BP systolics above 200.   She was managed with pushes of hydralazine and labetalol. A chest x-ray today showed signs of fluid overload and cardiomegaly on wet read.   Cardiology was consulted today for further management of BP management and possible cardiomyopathy.     She was lying flat today, resting comfortable. She denied any shortness of breath, chest pain, orthopnea or palpitations.   She notes that her pregnancy was uneventful other than the gestational diabetes and now pre-eclampsia. She does not have a history of hypertension and has never seen   a cardiologist or had an ECHO done before.       PAST MEDICAL & SURGICAL HISTORY:  Hypothyroid  DM (diabetes mellitus) in pregnancy      Allergies    No Known Allergies    Intolerances        MEDICATIONS  (STANDING):  diphtheria/tetanus/pertussis (acellular) Vaccine (ADAcel) 0.5 milliLiter(s) IntraMuscular once  ferrous    sulfate 325 milliGRAM(s) Oral daily  heparin  Injectable 7500 Unit(s) SubCutaneous every 8 hours  insulin lispro (HumaLOG) corrective regimen sliding scale   SubCutaneous Before meals and at bedtime  labetalol 200 milliGRAM(s) Oral two times a day  lactated ringers. 1000 milliLiter(s) (75 mL/Hr) IV Continuous <Continuous>  levothyroxine 175 MICROGram(s) Oral daily  magnesium sulfate Infusion 2.5 Gm/Hr (62.5 mL/Hr) IV Continuous <Continuous>  oxytocin Infusion 41.667 milliUNIT(s)/Min (125 mL/Hr) IV Continuous <Continuous>  oxytocin Infusion 333.333 milliUNIT(s)/Min (1000 mL/Hr) IV Continuous <Continuous>  oxytocin Infusion 41.667 milliUNIT(s)/Min (125 mL/Hr) IV Continuous <Continuous>  prenatal multivitamin 1 Tablet(s) Oral daily  sodium chloride 0.9%. 1000 milliLiter(s) (75 mL/Hr) IV Continuous <Continuous>    MEDICATIONS  (PRN):  dextrose 40% Gel 15 Gram(s) Oral once PRN Blood Glucose LESS THAN 70 milliGRAM(s)/deciliter  diphenhydrAMINE 25 milliGRAM(s) Oral every 6 hours PRN Itching  docusate sodium 100 milliGRAM(s) Oral two times a day PRN Stool Softening  glycerin Suppository - Adult 1 Suppository(s) Rectal at bedtime PRN Constipation  ibuprofen  Tablet. 600 milliGRAM(s) Oral every 6 hours PRN Mild Pain (1 - 3)  lanolin Ointment 1 Application(s) Topical every 3 hours PRN Sore Nipples  naloxone Injectable 0.1 milliGRAM(s) IV Push every 3 minutes PRN For ANY of the following changes in patient status:  A. RR LESS THAN 10 breaths per minute, B. Oxygen saturation LESS THAN 90%, C. Sedation score of 6  ondansetron Injectable 4 milliGRAM(s) IV Push every 6 hours PRN Nausea  oxyCODONE    5 mG/acetaminophen 325 mG 1 Tablet(s) Oral every 3 hours PRN Moderate Pain (4 - 6)  oxyCODONE    5 mG/acetaminophen 325 mG 2 Tablet(s) Oral every 6 hours PRN Severe Pain (7 - 10)  simethicone 80 milliGRAM(s) Chew every 4 hours PRN Gas      REVIEW OF SYSTEMS:  12 point ROS negative except for that stated in the HPI    PHYSICAL EXAM:  Vitals past 24 Hours: T(C): 37.3 (19 @ 13:51), Max: 37.3 (19 @ 13:51)  HR: 79 (19 @ 13:30) (72 - 93)  BP: 129/68 (19 @ 13:30) (93/44 - 208/96)  RR: 18 (19 @ 13:30) (17 - 20)  SpO2: 96% (19 @ 13:30) (94% - 100%)	    Daily Height in cm: 162.56 (2019 18:10)    Daily Weight Pre-pregnancy in k (2019 18:10)    GEN: Alert and awake, no acute distress  HEENT: Moist mucous membranes  Neck: No JVD  Cardiovascular: Regular rate and rhythm, +S1 S2. No murmurs, rubs, or gallops appreciated  Respiratory: Lungs clear to auscultation bilaterally  Gastrointestinal:  Soft, non-tender, non-distended, normoactive bowel sounds  Skin: No rashes, No ecchymoses, No cyanosis  Neurologic: Non-focal, alert and oriented x3.   Extremities: No clubbing, cyanosis or edema. Warm, well-perfused extremities.   Vascular: Radial and dorsalis pedis pulses 2+ bilaterally    I&O's Detail    2019 07:01  -  2019 07:00  --------------------------------------------------------  IN:    lactated ringers.: 300 mL    magnesium sulfate  Infusion: 250 mL    magnesium sulfate  Infusion: 62.5 mL  Total IN: 612.5 mL    OUT:    Estimated Blood Loss: 1000 mL    Indwelling Catheter - Urethral: 1790 mL  Total OUT: 2790 mL    Total NET: -2177.5 mL      2019 07:01  -  2019 14:33  --------------------------------------------------------  IN:  Total IN: 0 mL    OUT:    Indwelling Catheter - Urethral: 345 mL  Total OUT: 345 mL    Total NET: -345 mL            LABS:                        9.9    15.90 )-----------( 219      ( 2019 08:32 )             31.6     02-    136  |  101  |  6<L>  ----------------------------<  148<H>  4.1   |  24  |  0.54    Ca    7.4<L>      2019 08:32  Mg     5.8         TPro  5.6<L>  /  Alb  2.7<L>  /  TBili  0.2  /  DBili  x   /  AST  40  /  ALT  20  /  AlkPhos  58          PT/INR - ( 2019 08:32 )   PT: 10.9 sec;   INR: 0.97          PTT - ( 2019 08:32 )  PTT:25.7 sec  Urinalysis Basic - ( 2019 23:23 )    Color: Yellow / Appearance: Clear / S.020 / pH: x  Gluc: x / Ketone: 40 mg/dL  / Bili: Negative / Urobili: 0.2 E.U./dL   Blood: x / Protein: NEGATIVE mg/dL / Nitrite: NEGATIVE   Leuk Esterase: NEGATIVE / RBC: > 10 /HPF / WBC < 5 /HPF   Sq Epi: x / Non Sq Epi: 0-5 /HPF / Bacteria: Present /HPF      I&O's Summary    2019 07:  -  2019 07:00  --------------------------------------------------------  IN: 612.5 mL / OUT: 2790 mL / NET: -2177.5 mL    2019 07:01  -  2019 14:33  --------------------------------------------------------  IN: 0 mL / OUT: 345 mL / NET: -345 mL        CARDIAC DIAGNOSTIC TESTING:    ECG: normal sinus rhythm    TELEMETRY: not on tele     ECHO:   Mild to moderate left ventricular hypertrophy. Normal internal left ventricular dimensions.    The left ventricular wall motion is normal. The left ventricle is hyperdynamic and the overall ejection fraction is increased (>75%).    No pulmonary hypertension.     RADIOLOGY & ADDITIONAL STUDIES:  Cardiomegaly, some increase in central vascular prominence, no obvious pulmonary edema     ASSESSMENT/PLAN:    42 y/o F with PMHx of gestation diabetes and hypothyroidism now s/p emergency CS on 2019 for pre-clampsia. Cardiology following for increased pulmonary congestion and HTN.     Pre-eclampsia   - BP control is paramount. May use labetalol vs nifedipine to control BP   - seems like labetalol has been working for patient.   - would recommend labetalol 100 mg PO  BID and uptitrate as needed   - her CXR likely overloaded due to recent hypertension/ flash pulmonary edema -> given she is asymptomatic, would not recommend diuretics for this at this time   - ECHO WNL with some LVH likely to long-standing HTN or perhaps even diastolic dysfunction - may consider outpatient stress test and follow up with cardiologist after discharge     Will continue to follow   Please call us back with any changes in clinical status   Discussed with Dr. Hamilton Addison MD   Cardiology fellow-on call Patient is a 43y old  Female who presents with a chief complaint of postpartum (2019 05:25)      HPI:  42 y/o F w PMHx of gestational diabetes and previous pre-diabetes was admitted to [facility] on 2019 for an emergent  at 37 weeks when she was found to be hypertensive on a routine visit.     Her only home medications are synthroid, Levamir and a short acting insulin.   She delivered at 7 pm yesterday. Since then she was found to have severe proteinuria (urine protein to creatinine ratio 1.8, LFTs normal, plts normal) and was also hypertensive with BP systolics above 200.   She was managed with pushes of labetalol (and possibly hydralazine, no order on sunrise however). A chest x-ray today showed signs of fluid overload and cardiomegaly on wet read.   Cardiology was consulted today for further management of BP management and possible cardiomyopathy.     She was lying flat today, resting comfortable. She denied any shortness of breath, chest pain, orthopnea or palpitations.   She notes that her pregnancy was uneventful other than the gestational diabetes and now pre-eclampsia. She does not have a history of hypertension and has never seen   a cardiologist or had an ECHO done before.       PAST MEDICAL & SURGICAL HISTORY:  Hypothyroid  DM (diabetes mellitus) in pregnancy      Allergies    No Known Allergies    Intolerances        MEDICATIONS  (STANDING):  diphtheria/tetanus/pertussis (acellular) Vaccine (ADAcel) 0.5 milliLiter(s) IntraMuscular once  ferrous    sulfate 325 milliGRAM(s) Oral daily  heparin  Injectable 7500 Unit(s) SubCutaneous every 8 hours  insulin lispro (HumaLOG) corrective regimen sliding scale   SubCutaneous Before meals and at bedtime  labetalol 200 milliGRAM(s) Oral two times a day  lactated ringers. 1000 milliLiter(s) (75 mL/Hr) IV Continuous <Continuous>  levothyroxine 175 MICROGram(s) Oral daily  magnesium sulfate Infusion 2.5 Gm/Hr (62.5 mL/Hr) IV Continuous <Continuous>  oxytocin Infusion 41.667 milliUNIT(s)/Min (125 mL/Hr) IV Continuous <Continuous>  oxytocin Infusion 333.333 milliUNIT(s)/Min (1000 mL/Hr) IV Continuous <Continuous>  oxytocin Infusion 41.667 milliUNIT(s)/Min (125 mL/Hr) IV Continuous <Continuous>  prenatal multivitamin 1 Tablet(s) Oral daily  sodium chloride 0.9%. 1000 milliLiter(s) (75 mL/Hr) IV Continuous <Continuous>    MEDICATIONS  (PRN):  dextrose 40% Gel 15 Gram(s) Oral once PRN Blood Glucose LESS THAN 70 milliGRAM(s)/deciliter  diphenhydrAMINE 25 milliGRAM(s) Oral every 6 hours PRN Itching  docusate sodium 100 milliGRAM(s) Oral two times a day PRN Stool Softening  glycerin Suppository - Adult 1 Suppository(s) Rectal at bedtime PRN Constipation  ibuprofen  Tablet. 600 milliGRAM(s) Oral every 6 hours PRN Mild Pain (1 - 3)  lanolin Ointment 1 Application(s) Topical every 3 hours PRN Sore Nipples  naloxone Injectable 0.1 milliGRAM(s) IV Push every 3 minutes PRN For ANY of the following changes in patient status:  A. RR LESS THAN 10 breaths per minute, B. Oxygen saturation LESS THAN 90%, C. Sedation score of 6  ondansetron Injectable 4 milliGRAM(s) IV Push every 6 hours PRN Nausea  oxyCODONE    5 mG/acetaminophen 325 mG 1 Tablet(s) Oral every 3 hours PRN Moderate Pain (4 - 6)  oxyCODONE    5 mG/acetaminophen 325 mG 2 Tablet(s) Oral every 6 hours PRN Severe Pain (7 - 10)  simethicone 80 milliGRAM(s) Chew every 4 hours PRN Gas      REVIEW OF SYSTEMS:  12 point ROS negative except for that stated in the HPI    PHYSICAL EXAM:  Vitals past 24 Hours: T(C): 37.3 (19 @ 13:51), Max: 37.3 (19 @ 13:51)  HR: 79 (19 @ 13:30) (72 - 93)  BP: 129/68 (19 @ 13:30) (93/44 - 208/96)  RR: 18 (19 @ 13:30) (17 - 20)  SpO2: 96% (19 @ 13:30) (94% - 100%)	    Daily Height in cm: 162.56 (2019 18:10)    Daily Weight Pre-pregnancy in k (2019 18:10)    GEN: Alert and awake, no acute distress  HEENT: Moist mucous membranes  Neck: No JVD  Cardiovascular: Regular rate and rhythm, +S1 S2. No murmurs, rubs, or gallops appreciated  Respiratory: Lungs clear to auscultation bilaterally  Gastrointestinal:  Soft, non-tender, non-distended, normoactive bowel sounds  Skin: No rashes, No ecchymoses, No cyanosis  Neurologic: Non-focal, alert and oriented x3.   Extremities: No clubbing, cyanosis or edema. Warm, well-perfused extremities.   Vascular: Radial and dorsalis pedis pulses 2+ bilaterally    I&O's Detail    2019 07:01  -  2019 07:00  --------------------------------------------------------  IN:    lactated ringers.: 300 mL    magnesium sulfate  Infusion: 250 mL    magnesium sulfate  Infusion: 62.5 mL  Total IN: 612.5 mL    OUT:    Estimated Blood Loss: 1000 mL    Indwelling Catheter - Urethral: 1790 mL  Total OUT: 2790 mL    Total NET: -2177.5 mL      2019 07:01  -  2019 14:33  --------------------------------------------------------  IN:  Total IN: 0 mL    OUT:    Indwelling Catheter - Urethral: 345 mL  Total OUT: 345 mL    Total NET: -345 mL            LABS:                        9.9    15.90 )-----------( 219      ( 2019 08:32 )             31.6         136  |  101  |  6<L>  ----------------------------<  148<H>  4.1   |  24  |  0.54    Ca    7.4<L>      2019 08:32  Mg     5.8         TPro  5.6<L>  /  Alb  2.7<L>  /  TBili  0.2  /  DBili  x   /  AST  40  /  ALT  20  /  AlkPhos  58  -        PT/INR - ( 2019 08:32 )   PT: 10.9 sec;   INR: 0.97          PTT - ( 2019 08:32 )  PTT:25.7 sec  Urinalysis Basic - ( 2019 23:23 )    Color: Yellow / Appearance: Clear / S.020 / pH: x  Gluc: x / Ketone: 40 mg/dL  / Bili: Negative / Urobili: 0.2 E.U./dL   Blood: x / Protein: NEGATIVE mg/dL / Nitrite: NEGATIVE   Leuk Esterase: NEGATIVE / RBC: > 10 /HPF / WBC < 5 /HPF   Sq Epi: x / Non Sq Epi: 0-5 /HPF / Bacteria: Present /HPF      I&O's Summary    2019 07:  -  2019 07:00  --------------------------------------------------------  IN: 612.5 mL / OUT: 2790 mL / NET: -2177.5 mL    2019 07:  -  2019 14:33  --------------------------------------------------------  IN: 0 mL / OUT: 345 mL / NET: -345 mL        CARDIAC DIAGNOSTIC TESTING:    ECG: normal sinus rhythm    TELEMETRY: not on tele     ECHO:   Mild to moderate left ventricular hypertrophy. Normal internal left ventricular dimensions.    The left ventricular wall motion is normal. The left ventricle is hyperdynamic and the overall ejection fraction is increased (>75%).    No pulmonary hypertension.     RADIOLOGY & ADDITIONAL STUDIES:  Cardiomegaly, some increase in central vascular prominence, no obvious pulmonary edema     ASSESSMENT/PLAN:    42 y/o F with PMHx of gestation diabetes and hypothyroidism now s/p emergency CS on 2019 for pre-clampsia. Cardiology following for increased pulmonary congestion and HTN.     Pre-eclampsia   - BP control is paramount. May use labetalol vs nifedipine to control BP   - seems like labetalol has been working for patient.   - would recommend labetalol 100 mg PO  BID and uptitrate as needed   - her CXR likely overloaded due to recent hypertension/ flash pulmonary edema -> given she is asymptomatic, would not recommend diuretics for this at this time   - ECHO WNL with some LVH likely to long-standing HTN or perhaps even diastolic dysfunction - may consider outpatient stress test and follow up with cardiologist after discharge     Will continue to follow   Please call us back with any changes in clinical status   Discussed with Dr. Hamilton Addison MD   Cardiology fellow-on call

## 2019-02-23 NOTE — LACTATION INITIAL EVALUATION - NS LACT CON REASON FOR REQ
37.4wk gestation. 18hrs post primary c/s. 42yo primipara with h/o Insulin dependant diabetes, morbid obesity, hypothyroid, and preeclampsia with suspected underlying cardiac condition waiting for diagnosis. mom still in L&D for monitoring.  has been formula fed. mom intends to breastfeed. taught hand expression with return demo, tiny drops of colostrum expressible from L breast, none from R. advised 15min of hand expression q2-3h while maximizing s2s. will provide pump instructions when more stable or when transferred to MB unit. primary nurse has assisted with latch and states baby is uninterested and does not suck at breast. reassured mom to increase s2s and continue to offer breast first prior to formula feeding./primaparous mom

## 2019-02-23 NOTE — PROGRESS NOTE ADULT - SUBJECTIVE AND OBJECTIVE BOX
late entry    At bedside due to severe range BPs 180s-200s/100s on A-line, initially not correlating with external BP cuff.  Achieved control with Labetalol IVP 20mg and then 40mg, also received Nifedipine 10mg IR.      Subjectively pt without complaints.  Denies HA/blurry vision, tolerating magnesium well.  No epigastric or RUQ pain.  No SOB.  Pain controlled.      O:  T(C): 36.7 (19 @ 21:00), Max: 36.7 (19 @ 21:00)  HR: 84 (19 @ 23:30) (75 - 89)  BP: 208/96 (19 @ 23:30) (93/44 - 208/96)  RR: 18 (19 @ 23:30) (17 - 18)  SpO2: 99% (19 @ 23:30) (98% - 100%)  Wt(kg): --  Daily Height in cm: 162.56 (2019 18:10)    Daily Weight Pre-pregnancy in k (2019 18:10)     @ 07:01  -   @ 01:26  --------------------------------------------------------  IN: 612.5 mL / OUT: 2555 mL / NET: -1942.5 mL      Gen: NAD, AAOx3  CV: RRR, no M/R/G  Pulm: CTAB, appears to use accessory muscles  Abd: soft, nontender, no rebound or guarding, no epigastric tenderness, liver nonpalpable +BS.   : Rico   Ext: +2 edema virgil, SCDs in place, Reflexes WNL                          11.3   15.34 )-----------( 214      ( 2019 23:22 )             35.4         138  |  104  |  5<L>  ----------------------------<  140<H>  3.4<L>   |  22  |  0.45<L>    Ca    7.9<L>      2019 23:22  Mg     3.3         TPro  5.9<L>  /  Alb  2.9<L>  /  TBili  0.2  /  DBili  x   /  AST  30  /  ALT  20  /  AlkPhos  64

## 2019-02-23 NOTE — LACTATION INITIAL EVALUATION - PRO FEM REPRO BREASTFEED YN
baby has not yet latched on. 18hrs old and has been formula fed. mom advised to continue to offer breast first before feeding formula./no

## 2019-02-23 NOTE — CONSULT NOTE ADULT - SUBJECTIVE AND OBJECTIVE BOX
This is 43 year old female with past medical history for pre diabetes and morbidity obesity, 37 weeks and 5 days pregnant . The patient was sent over to the hospital as per patient for abnormal results for ultrasound. She was found to have BP to 220's systolic so she was diagnosed with pre eclampsia and got  . The patient was started on Magnesium drip, got labetalol pushes and hydralazine. Was put on Labetalol 200 mg twice a day. The renal service is called for further recommendations. The patient seen in the afternoon in her bed, on magnesium drip. Does not have any complaints today - no blurry visions no chest tightness, no abdominal pain. Declares never before or during the pregnancy was diagnosed with high blood pressure. Only pre diabetes treated with metformin which was stopped before the pregnancy. No history for kidney disease     FH: not significant   SH: none      Patient is a 43y Female admitted for     PAST MEDICAL & SURGICAL HISTORY:  Hypothyroid  DM (diabetes mellitus) in pregnancy      MEDICATIONS  (STANDING):  diphtheria/tetanus/pertussis (acellular) Vaccine (ADAcel) 0.5 milliLiter(s) IntraMuscular once  ferrous    sulfate 325 milliGRAM(s) Oral daily  heparin  Injectable 7500 Unit(s) SubCutaneous every 8 hours  insulin lispro (HumaLOG) corrective regimen sliding scale   SubCutaneous Before meals and at bedtime  labetalol 300 milliGRAM(s) Oral three times a day  lactated ringers. 1000 milliLiter(s) (75 mL/Hr) IV Continuous <Continuous>  levothyroxine 175 MICROGram(s) Oral daily  magnesium sulfate Infusion 2.5 Gm/Hr (62.5 mL/Hr) IV Continuous <Continuous>  oxytocin Infusion 41.667 milliUNIT(s)/Min (125 mL/Hr) IV Continuous <Continuous>  oxytocin Infusion 333.333 milliUNIT(s)/Min (1000 mL/Hr) IV Continuous <Continuous>  oxytocin Infusion 41.667 milliUNIT(s)/Min (125 mL/Hr) IV Continuous <Continuous>  prenatal multivitamin 1 Tablet(s) Oral daily  sodium chloride 0.9%. 1000 milliLiter(s) (75 mL/Hr) IV Continuous <Continuous>    MEDICATIONS  (PRN):  dextrose 40% Gel 15 Gram(s) Oral once PRN Blood Glucose LESS THAN 70 milliGRAM(s)/deciliter  diphenhydrAMINE 25 milliGRAM(s) Oral every 6 hours PRN Itching  docusate sodium 100 milliGRAM(s) Oral two times a day PRN Stool Softening  glycerin Suppository - Adult 1 Suppository(s) Rectal at bedtime PRN Constipation  ibuprofen  Tablet. 600 milliGRAM(s) Oral every 6 hours PRN Mild Pain (1 - 3)  lanolin Ointment 1 Application(s) Topical every 3 hours PRN Sore Nipples  naloxone Injectable 0.1 milliGRAM(s) IV Push every 3 minutes PRN For ANY of the following changes in patient status:  A. RR LESS THAN 10 breaths per minute, B. Oxygen saturation LESS THAN 90%, C. Sedation score of 6  ondansetron Injectable 4 milliGRAM(s) IV Push every 6 hours PRN Nausea  oxyCODONE    5 mG/acetaminophen 325 mG 1 Tablet(s) Oral every 3 hours PRN Moderate Pain (4 - 6)  oxyCODONE    5 mG/acetaminophen 325 mG 2 Tablet(s) Oral every 6 hours PRN Severe Pain (7 - 10)  simethicone 80 milliGRAM(s) Chew every 4 hours PRN Gas      Allergies    No Known Allergies    Intolerances        SOCIAL HISTORY:    FAMILY HISTORY:      T(C): , Max: 37.3 (19 @ 13:51)  T(F): , Max: 99.1 (19 @ 13:51)  HR: 84 (19 @ 16:30)  BP: 151/79 (19 @ 16:30)  BP(mean): --  RR: 18 (19 @ 16:30)  SpO2: 97% (19 @ 16:30)  Wt(kg): --     @ 07:01  -   @ 07:00  --------------------------------------------------------  IN: 612.5 mL / OUT: 2790 mL / NET: -2177.5 mL     @ 07:01  -   @ 16:51  --------------------------------------------------------  IN: 0 mL / OUT: 560 mL / NET: -560 mL    Physical exam:   Alert and oriented   No JVD   Normal air entry into the lungs, no wheezing, no crackles   RRR, normal s1/s2, no murmurs, rubs, gallops   Abdomen - soft, not tender, not distended   Extremities: 1 + edema       Height (cm): 162.56 ( @ 19:09)      LABS:                        9.9    15.90 )-----------( 219      ( 2019 08:32 )             31.6         136  |  101  |  6<L>  ----------------------------<  148<H>  4.1   |  24  |  0.54    Ca    7.4<L>      2019 08:32  Mg     5.9         TPro  5.6<L>  /  Alb  2.7<L>  /  TBili  0.2  /  DBili  x   /  AST  40  /  ALT  20  /  AlkPhos  58      Uric Acid, Serum: 4.1 mg/dL [2.5 - 7.0] ( @ 08:32)  Uric Acid, Serum: 3.6 mg/dL [2.5 - 7.0] ( @ 23:22)    PT/INR - ( 2019 08:32 )   PT: 10.9 sec;   INR: 0.97          PTT - ( 2019 08:32 )  PTT:25.7 sec  Urinalysis Basic - ( 2019 23:23 )    Color: Yellow / Appearance: Clear / S.020 / pH: x  Gluc: x / Ketone: 40 mg/dL  / Bili: Negative / Urobili: 0.2 E.U./dL   Blood: x / Protein: NEGATIVE mg/dL / Nitrite: NEGATIVE   Leuk Esterase: NEGATIVE / RBC: > 10 /HPF / WBC < 5 /HPF   Sq Epi: x / Non Sq Epi: 0-5 /HPF / Bacteria: Present /HPF      Creatinine, Random Urine: 84 mg/dL ( @ 15:00)  Protein/Creatinine Ratio Calculation: 1.8 Ratio ( @ 15:00)        RADIOLOGY & ADDITIONAL STUDIES:

## 2019-02-23 NOTE — PROGRESS NOTE ADULT - SUBJECTIVE AND OBJECTIVE BOX
Pt seen at bedside for severe range BP on a-line reading 180s/100s with HR in 80s, o2 saturation in 90s.  Pt previously received 20 and 40 mg of IV Labetalol; 80mg IVP given at time of visit with appropriate response.    Overnight, chest Xray performed due to complaint of shortness of breath.  Per verbal report imaging showed mild to moderate pulmonary vascular congestion and cardiomegaly, no evidence of effusion or edema.    Reports feeling well, remains asymptomatic.  Reports that shortness of breath has resolved and she denies any pain.  No toxic symptoms.    Physical Exam:  Vital Signs Last 24 Hrs  T(C): 36.9 (23 Feb 2019 00:01), Max: 36.9 (23 Feb 2019 00:01)  T(F): 98.4 (23 Feb 2019 00:01), Max: 98.4 (23 Feb 2019 00:01)  HR: 78 (23 Feb 2019 04:45) (72 - 93)  BP: 162/92 (23 Feb 2019 04:40) (93/44 - 208/96)  BP(mean): --  RR: 18 (23 Feb 2019 04:45) (17 - 18)  SpO2: 98% (23 Feb 2019 04:45) (96% - 100%)    GA: NAD, A+0 x 3  Lungs: CTAB no ronchi/rales/crackles  Abd: hypoactive bowel sounds, obese abdomen, +edematous panus, prevena in place functioning well    : lochia WNL  Rico: in situ  Extremities: diffuse edema, nonpitting, reflexes WNL                            11.3   15.34 )-----------( 214      ( 22 Feb 2019 23:22 )             35.4     02-22    138  |  104  |  5<L>  ----------------------------<  140<H>  3.4<L>   |  22  |  0.45<L>    Ca    7.9<L>      22 Feb 2019 23:22  Mg     4.2     02-23    TPro  5.9<L>  /  Alb  2.9<L>  /  TBili  0.2  /  DBili  x   /  AST  30  /  ALT  20  /  AlkPhos  64  02-22    Magnesium, Serum: 4.2 mg/dL (02-23 @ 02:39)  Magnesium, Serum: 3.3 mg/dL (02-22 @ 23:22)    PT/INR - ( 22 Feb 2019 23:22 )   PT: 10.9 sec;   INR: 0.97          PTT - ( 22 Feb 2019 23:22 )  PTT:24.8 sec      I&O's Detail    22 Feb 2019 07:01  -  23 Feb 2019 05:30  --------------------------------------------------------  IN:    lactated ringers.: 300 mL    magnesium sulfate  Infusion: 250 mL    magnesium sulfate  Infusion: 62.5 mL  Total IN: 612.5 mL    OUT:    Estimated Blood Loss: 1000 mL    Indwelling Catheter - Urethral: 1730 mL  Total OUT: 2730 mL    Total NET: -2117.5 mL

## 2019-02-24 LAB
ALBUMIN SERPL ELPH-MCNC: 2.8 G/DL — LOW (ref 3.3–5)
ALP SERPL-CCNC: 53 U/L — SIGNIFICANT CHANGE UP (ref 40–120)
ALT FLD-CCNC: 21 U/L — SIGNIFICANT CHANGE UP (ref 10–45)
ANION GAP SERPL CALC-SCNC: 11 MMOL/L — SIGNIFICANT CHANGE UP (ref 5–17)
AST SERPL-CCNC: 28 U/L — SIGNIFICANT CHANGE UP (ref 10–40)
BILIRUB SERPL-MCNC: 0.2 MG/DL — SIGNIFICANT CHANGE UP (ref 0.2–1.2)
BUN SERPL-MCNC: 9 MG/DL — SIGNIFICANT CHANGE UP (ref 7–23)
CALCIUM SERPL-MCNC: 7.6 MG/DL — LOW (ref 8.4–10.5)
CHLORIDE SERPL-SCNC: 100 MMOL/L — SIGNIFICANT CHANGE UP (ref 96–108)
CO2 SERPL-SCNC: 24 MMOL/L — SIGNIFICANT CHANGE UP (ref 22–31)
CREAT SERPL-MCNC: 0.59 MG/DL — SIGNIFICANT CHANGE UP (ref 0.5–1.3)
GLUCOSE BLDC GLUCOMTR-MCNC: 130 MG/DL — HIGH (ref 70–99)
GLUCOSE BLDC GLUCOMTR-MCNC: 136 MG/DL — HIGH (ref 70–99)
GLUCOSE BLDC GLUCOMTR-MCNC: 152 MG/DL — HIGH (ref 70–99)
GLUCOSE BLDC GLUCOMTR-MCNC: 163 MG/DL — HIGH (ref 70–99)
GLUCOSE SERPL-MCNC: 154 MG/DL — HIGH (ref 70–99)
HBA1C BLD-MCNC: 6.7 % — HIGH (ref 4–5.6)
HCT VFR BLD CALC: 27.3 % — LOW (ref 34.5–45)
HGB BLD-MCNC: 9 G/DL — LOW (ref 11.5–15.5)
LACTATE SERPL-SCNC: 0.9 MMOL/L — SIGNIFICANT CHANGE UP (ref 0.5–2)
LDH SERPL L TO P-CCNC: 447 U/L — HIGH (ref 50–242)
MCHC RBC-ENTMCNC: 29.7 PG — SIGNIFICANT CHANGE UP (ref 27–34)
MCHC RBC-ENTMCNC: 33 GM/DL — SIGNIFICANT CHANGE UP (ref 32–36)
MCV RBC AUTO: 90.1 FL — SIGNIFICANT CHANGE UP (ref 80–100)
NRBC # BLD: 0 /100 WBCS — SIGNIFICANT CHANGE UP (ref 0–0)
PLATELET # BLD AUTO: 226 K/UL — SIGNIFICANT CHANGE UP (ref 150–400)
POTASSIUM SERPL-MCNC: 4 MMOL/L — SIGNIFICANT CHANGE UP (ref 3.5–5.3)
POTASSIUM SERPL-SCNC: 4 MMOL/L — SIGNIFICANT CHANGE UP (ref 3.5–5.3)
PROT SERPL-MCNC: 5.6 G/DL — LOW (ref 6–8.3)
RBC # BLD: 3.03 M/UL — LOW (ref 3.8–5.2)
RBC # FLD: 14.6 % — HIGH (ref 10.3–14.5)
SODIUM SERPL-SCNC: 135 MMOL/L — SIGNIFICANT CHANGE UP (ref 135–145)
URATE SERPL-MCNC: 4.9 MG/DL — SIGNIFICANT CHANGE UP (ref 2.5–7)
WBC # BLD: 16.55 K/UL — HIGH (ref 3.8–10.5)
WBC # FLD AUTO: 16.55 K/UL — HIGH (ref 3.8–10.5)

## 2019-02-24 PROCEDURE — 99231 SBSQ HOSP IP/OBS SF/LOW 25: CPT

## 2019-02-24 PROCEDURE — 99233 SBSQ HOSP IP/OBS HIGH 50: CPT

## 2019-02-24 PROCEDURE — 99254 IP/OBS CNSLTJ NEW/EST MOD 60: CPT

## 2019-02-24 RX ORDER — FUROSEMIDE 40 MG
20 TABLET ORAL ONCE
Qty: 0 | Refills: 0 | Status: COMPLETED | OUTPATIENT
Start: 2019-02-24 | End: 2019-02-24

## 2019-02-24 RX ORDER — FUROSEMIDE 40 MG
10 TABLET ORAL ONCE
Qty: 0 | Refills: 0 | Status: COMPLETED | OUTPATIENT
Start: 2019-02-24 | End: 2019-02-24

## 2019-02-24 RX ORDER — SODIUM CHLORIDE 0.65 %
1 AEROSOL, SPRAY (ML) NASAL THREE TIMES A DAY
Qty: 0 | Refills: 0 | Status: DISCONTINUED | OUTPATIENT
Start: 2019-02-24 | End: 2019-02-26

## 2019-02-24 RX ORDER — LABETALOL HCL 100 MG
400 TABLET ORAL
Qty: 0 | Refills: 0 | Status: DISCONTINUED | OUTPATIENT
Start: 2019-02-24 | End: 2019-02-26

## 2019-02-24 RX ORDER — ACETAMINOPHEN 500 MG
650 TABLET ORAL EVERY 6 HOURS
Qty: 0 | Refills: 0 | Status: DISCONTINUED | OUTPATIENT
Start: 2019-02-24 | End: 2019-02-26

## 2019-02-24 RX ADMIN — OXYCODONE AND ACETAMINOPHEN 2 TABLET(S): 5; 325 TABLET ORAL at 11:44

## 2019-02-24 RX ADMIN — Medication 1: at 12:44

## 2019-02-24 RX ADMIN — Medication 400 MILLIGRAM(S): at 16:22

## 2019-02-24 RX ADMIN — Medication 1: at 22:24

## 2019-02-24 RX ADMIN — Medication 650 MILLIGRAM(S): at 08:27

## 2019-02-24 RX ADMIN — SIMETHICONE 80 MILLIGRAM(S): 80 TABLET, CHEWABLE ORAL at 05:59

## 2019-02-24 RX ADMIN — Medication 100 MILLIGRAM(S): at 06:00

## 2019-02-24 RX ADMIN — Medication 300 MILLIGRAM(S): at 09:15

## 2019-02-24 RX ADMIN — OXYCODONE AND ACETAMINOPHEN 1 TABLET(S): 5; 325 TABLET ORAL at 20:40

## 2019-02-24 RX ADMIN — OXYCODONE AND ACETAMINOPHEN 1 TABLET(S): 5; 325 TABLET ORAL at 21:10

## 2019-02-24 RX ADMIN — Medication 1 TABLET(S): at 12:26

## 2019-02-24 RX ADMIN — Medication 20 MILLIGRAM(S): at 16:21

## 2019-02-24 RX ADMIN — Medication 175 MICROGRAM(S): at 05:58

## 2019-02-24 RX ADMIN — Medication 400 MILLIGRAM(S): at 22:29

## 2019-02-24 RX ADMIN — Medication 100 MILLIGRAM(S): at 20:40

## 2019-02-24 RX ADMIN — Medication 10 MILLIGRAM(S): at 12:26

## 2019-02-24 RX ADMIN — Medication 325 MILLIGRAM(S): at 12:26

## 2019-02-24 RX ADMIN — HEPARIN SODIUM 7500 UNIT(S): 5000 INJECTION INTRAVENOUS; SUBCUTANEOUS at 07:46

## 2019-02-24 RX ADMIN — HEPARIN SODIUM 7500 UNIT(S): 5000 INJECTION INTRAVENOUS; SUBCUTANEOUS at 16:22

## 2019-02-24 RX ADMIN — Medication 650 MILLIGRAM(S): at 06:15

## 2019-02-24 RX ADMIN — OXYCODONE AND ACETAMINOPHEN 1 TABLET(S): 5; 325 TABLET ORAL at 23:52

## 2019-02-24 RX ADMIN — Medication 300 MILLIGRAM(S): at 01:22

## 2019-02-24 RX ADMIN — Medication 1000 MILLIGRAM(S): at 00:21

## 2019-02-24 NOTE — PROVIDER CONTACT NOTE (OTHER) - SITUATION
Pt presents with a RR of 21 BP of 138/69 HR 85 and O2 Sat of 96% on room air and oral temp of 36.7 C

## 2019-02-24 NOTE — PROGRESS NOTE ADULT - PROBLEM SELECTOR PLAN 1
- without end organ damages   - still not control   - can increase the labetolol to 400 mg four times a day, consider Nifedipine 30 mg if the BP is not control in the end of the day   - labs noted   - magnesium drip stopped on 2/23 afternoon   - no objection for lasix 10 mg ivp   - the goal is bp below 140/90 (most of the readings during the day around 110-120/70 to 80)  - check labs daily - BMP, LDH, CBC< uric acid, AST, ALT, LFTS  - not significant proteinuria.

## 2019-02-24 NOTE — PROGRESS NOTE ADULT - SUBJECTIVE AND OBJECTIVE BOX
SUBJECTIVE:    Pt reports feeling well.  only complaint is classic orthopneic symptoms.  Denies cp, sob while sitting up, palpitations, dizziness, etc    MEDICATIONS:  acetaminophen   Tablet .. 650 milliGRAM(s) Oral every 6 hours PRN  dextrose 40% Gel 15 Gram(s) Oral once PRN  diphenhydrAMINE 25 milliGRAM(s) Oral every 6 hours PRN  diphtheria/tetanus/pertussis (acellular) Vaccine (ADAcel) 0.5 milliLiter(s) IntraMuscular once  docusate sodium 100 milliGRAM(s) Oral two times a day PRN  ferrous    sulfate 325 milliGRAM(s) Oral daily  glycerin Suppository - Adult 1 Suppository(s) Rectal at bedtime PRN  heparin  Injectable 7500 Unit(s) SubCutaneous every 8 hours  ibuprofen  Tablet. 600 milliGRAM(s) Oral every 6 hours PRN  insulin lispro (HumaLOG) corrective regimen sliding scale   SubCutaneous Before meals and at bedtime  labetalol 300 milliGRAM(s) Oral three times a day  lanolin Ointment 1 Application(s) Topical every 3 hours PRN  levothyroxine 175 MICROGram(s) Oral daily  naloxone Injectable 0.1 milliGRAM(s) IV Push every 3 minutes PRN  ondansetron Injectable 4 milliGRAM(s) IV Push every 6 hours PRN  oxyCODONE    5 mG/acetaminophen 325 mG 1 Tablet(s) Oral every 3 hours PRN  oxyCODONE    5 mG/acetaminophen 325 mG 2 Tablet(s) Oral every 6 hours PRN  oxytocin Infusion 41.667 milliUNIT(s)/Min IV Continuous <Continuous>  oxytocin Infusion 333.333 milliUNIT(s)/Min IV Continuous <Continuous>  oxytocin Infusion 41.667 milliUNIT(s)/Min IV Continuous <Continuous>  prenatal multivitamin 1 Tablet(s) Oral daily  simethicone 80 milliGRAM(s) Chew every 4 hours PRN  sodium chloride 0.65% Nasal 1 Spray(s) Both Nostrils three times a day PRN  	    PHYSICAL EXAM:  T(C): 36.7 (19 @ 05:00), Max: 36.7 (19 @ 05:00)  HR: 91 (19 @ 08:00) (78 - 97)  BP: 157/79 (19 @ 08:00) (108/51 - 166/85)  RR: 21 (19 @ 08:00) (18 - 21)  SpO2: 96% (19 @ 08:00) (94% - 98%)  Wt(kg): --    GEN: Awake, comfortable. No acute distress. sitting up in chair  HEENT: Moist mucous membranes.   Neck: unable to assess due to body habitus  CV: RRR, Normal S1/S2. No murmurs, rubs, or gallops appreciated.   RESP: inspiratory rales bibasilar, mid and upper lungs sounds clear  ABD: obese, Soft, tender at incision site   EXT: Warm, well-perfused extremities. mild edema  NEURO: No focal deficits.    I&O's Summary    2019 07:  -  2019 07:00  --------------------------------------------------------  IN: 0 mL / OUT: 1265 mL / NET: -1265 mL    2019 07:01  -  2019 11:49  --------------------------------------------------------  IN: 0 mL / OUT: 75 mL / NET: -75 mL            LABS:	 	    CARDIAC MARKERS:                                  9.0    16.55 )-----------( 226      ( 2019 05:56 )             27.3         135  |  100  |  9   ----------------------------<  154<H>  4.0   |  24  |  0.59    Ca    7.6<L>      2019 05:56  Mg     5.9         TPro  5.6<L>  /  Alb  2.8<L>  /  TBili  0.2  /  DBili  x   /  AST  28  /  ALT  21  /  AlkPhos  53      proBNP:   Lipid Profile:   HgA1c: Hemoglobin A1C, Whole Blood: 6.7 % ( @ 05:56)    ECHO:  < from: TTE Echo w/Cont Complete (19 @ 14:15) >  Contrast injection with Lumason was performed.  Left Ventricle  Mild to moderate left ventricular hypertrophy. Normal internal left  ventricular dimensions.  The left ventricular wall motion is normal.  The left ventricle is hyperdynamic and the overall ejection fraction is  increased (>75%).  Left Atrium  Probably normal left atrial size, but not well imaged.  Right Atrium  Right atrial size is normal.  Right Ventricle  The right ventricle is normal in size and function.  Aortic Valve  The aortic valve is not well visualized.  The number of aortic valve cusps cannot be discerned.  No aortic regurgitation noted.  Mitral Valve  Structurally normal mitral valve.  There is mild mitral regurgitation.  Tricuspid Valve  Structurally normal tricuspid valve.  There is mild tricuspid regurgitation.  The pulmonary artery systolic pressure is estimated to be 33 mmHg.  There is no echocardiographic evidence for pulmonary hypertension.  Pulmonic Valve  Structurally normal pulmonic valve.  No pulmonic regurgitation noted.  Arteries and Venous System  No aortic root dilatation.  Normal size aortic arch with no hemodynamic evidence for coarctation  Pericardium / Pleura  There is no pericardial effusion.  Additional Comments  Recommend repeat non-urgent echo in the next several months when patient  images may be improved.    < end of copied text >      ASSESSMENT/PLAN:  43 y.o female pmh of obesity, gestational dm, presents with pre-eclampsia s/p urgent  at 37 weeks BP controlled with Labetalol 300mg TID    Pt with significant orthopneic symptoms and evidence of pulm edema on CXR and exam.  Would recommend Lasix 10mg IV x1 as pt is lasix naive, and will evaluate for symptomatic improvement.  Montior in's and outs please.  Discussed with primary team if pt does not respond to that dose with diuresis, can attempt to double dose a few hours later and reassess.    continue Labetalol per nephrology and primary team  No need for IVF at this time in setting of pulmonary edema and hypertension  Would recommend avoiding NSAIDs as could worsen fluid retention and BP  Case discussed with Primary Team and Cardiology Attending.

## 2019-02-24 NOTE — PROGRESS NOTE ADULT - SUBJECTIVE AND OBJECTIVE BOX
Patient evaluated at bedside.  Sitting up in chair; reports feeling comfortable right now however has not been able to lay down in bed due to shortness of breath.  She reports pain is well controlled with OPM.  She denies nausea, vomiting or heavy vaginal bleeding.  She has been ambulating without assistance, voiding spontaneously, passing gas, tolerating regular diet and is breastfeeding.    Physical Exam:  Vital Signs Last 24 Hrs  T(C): 36.7 (24 Feb 2019 15:24), Max: 36.7 (24 Feb 2019 05:00)  T(F): 98 (24 Feb 2019 15:24), Max: 98.1 (24 Feb 2019 05:00)  HR: 86 (24 Feb 2019 15:24) (78 - 97)  BP: 143/84 (24 Feb 2019 15:24) (108/51 - 166/85)  BP(mean): 88 (24 Feb 2019 07:00) (83 - 97)  RR: 28 (24 Feb 2019 15:24) (18 - 28)  SpO2: 98% (24 Feb 2019 15:24) (94% - 98%)                              9.0    16.55 )-----------( 226      ( 24 Feb 2019 05:56 )             27.3     02-24    135  |  100  |  9   ----------------------------<  154<H>  4.0   |  24  |  0.59    Ca    7.6<L>      24 Feb 2019 05:56  Mg     5.9     02-23    TPro  5.6<L>  /  Alb  2.8<L>  /  TBili  0.2  /  DBili  x   /  AST  28  /  ALT  21  /  AlkPhos  53  02-24      PT/INR - ( 23 Feb 2019 08:32 )   PT: 10.9 sec;   INR: 0.97          PTT - ( 23 Feb 2019 08:32 )  PTT:25.7 sec

## 2019-02-24 NOTE — CONSULT NOTE ADULT - SUBJECTIVE AND OBJECTIVE BOX
44 y/o F w/ Hx of pre gestational Dm2, Obesity and hypothyroidism. Consult requested for hyperglycemia.  Pt was admitted for urgent  delivery after being found to have pre-eclampsia. She was previously on metformin only prior to pregnancy. She was subsequently started on basal bolus regimen, which she was compliant w/ throughout her pregnancy. she requires up to Levemir 55 units BID and Humalog 40 units TIDAC. She reports not tolerating meals other than simple carbs throughout pregnancy. She now reports no n/v and is tolerating a diabetic diet well. She otherwise denies any f/c, CP, SOB, palpitations, stool/urinary changes, motor or sensory deficits.    PAST MEDICAL & SURGICAL HISTORY:  Hypothyroid  DM (diabetes mellitus) in pregnancy    MEDICATIONS  (STANDING):  diphtheria/tetanus/pertussis (acellular) Vaccine (ADAcel) 0.5 milliLiter(s) IntraMuscular once  ferrous    sulfate 325 milliGRAM(s) Oral daily  heparin  Injectable 7500 Unit(s) SubCutaneous every 8 hours  insulin lispro (HumaLOG) corrective regimen sliding scale   SubCutaneous Before meals and at bedtime  labetalol 400 milliGRAM(s) Oral four times a day  levothyroxine 175 MICROGram(s) Oral daily  oxytocin Infusion 41.667 milliUNIT(s)/Min (125 mL/Hr) IV Continuous <Continuous>  oxytocin Infusion 333.333 milliUNIT(s)/Min (1000 mL/Hr) IV Continuous <Continuous>  oxytocin Infusion 41.667 milliUNIT(s)/Min (125 mL/Hr) IV Continuous <Continuous>  prenatal multivitamin 1 Tablet(s) Oral daily    MEDICATIONS  (PRN):  acetaminophen   Tablet .. 650 milliGRAM(s) Oral every 6 hours PRN Mild Pain (1 - 3)  dextrose 40% Gel 15 Gram(s) Oral once PRN Blood Glucose LESS THAN 70 milliGRAM(s)/deciliter  diphenhydrAMINE 25 milliGRAM(s) Oral every 6 hours PRN Itching  docusate sodium 100 milliGRAM(s) Oral two times a day PRN Stool Softening  glycerin Suppository - Adult 1 Suppository(s) Rectal at bedtime PRN Constipation  ibuprofen  Tablet. 600 milliGRAM(s) Oral every 6 hours PRN Mild Pain (1 - 3)  lanolin Ointment 1 Application(s) Topical every 3 hours PRN Sore Nipples  naloxone Injectable 0.1 milliGRAM(s) IV Push every 3 minutes PRN For ANY of the following changes in patient status:  A. RR LESS THAN 10 breaths per minute, B. Oxygen saturation LESS THAN 90%, C. Sedation score of 6  ondansetron Injectable 4 milliGRAM(s) IV Push every 6 hours PRN Nausea  oxyCODONE    5 mG/acetaminophen 325 mG 1 Tablet(s) Oral every 3 hours PRN Moderate Pain (4 - 6)  oxyCODONE    5 mG/acetaminophen 325 mG 2 Tablet(s) Oral every 6 hours PRN Severe Pain (7 - 10)  simethicone 80 milliGRAM(s) Chew every 4 hours PRN Gas  sodium chloride 0.65% Nasal 1 Spray(s) Both Nostrils three times a day PRN Nasal Congestion    Home Medications:  HumaLOG:  (02 Oct 2018 21:50)  Synthroid:  (02 Oct 2018 21:50)    PE: A&Ox3, NAD, pleasant; neck w/o goiter; Lungs CTAb/l; Chest no MRG, RRR; Abd NTND; skin w/o jaundice; no motor/sensory deficits.    Vital Signs Last 24 Hrs  T(C): 36.7 (2019 05:00), Max: 36.7 (2019 05:00)  T(F): 98.1 (2019 05:00), Max: 98.1 (2019 05:00)  HR: 91 (2019 08:00) (78 - 97)  BP: 157/79 (2019 08:00) (108/51 - 166/85)  BP(mean): 88 (2019 07:00) (83 - 97)  RR: 21 (2019 08:00) (18 - 21)  SpO2: 96% (2019 08:00) (94% - 98%)    CAPILLARY BLOOD GLUCOSE      POCT Blood Glucose.: 152 mg/dL (2019 12:37)  POCT Blood Glucose.: 136 mg/dL (2019 08:11)  POCT Blood Glucose.: 207 mg/dL (2019 23:13)  POCT Blood Glucose.: 141 mg/dL (2019 18:37)  POCT Blood Glucose.: 142 mg/dL (2019 14:42)      A/P  - Continue NISS as she is now mostly at target glycemic control. Target of 140-180.  - Advised that she continue to monitor FSG after discharge, if hyperglycemia higher than 180 occurs, advised that she restart Metformin 500 mg PO QD and if tolerating that dose well, to increase to 500 mg BID.  - Pt will follow up with personal Endocrinologist for further metabolic treatment.  - Agree with continued Synthroid at pre pregnancy dose of 175 mcg PO QD.

## 2019-02-24 NOTE — PROGRESS NOTE ADULT - SUBJECTIVE AND OBJECTIVE BOX
Seen in the morning in her room, sitting in her chair, no headache, no blurry vision, no abdominal pain, no shortness of breath, no fever. Overnight one episode of shortness of breath    The renal follows for pre eclampsia    Patient seen and examined at bedside.     acetaminophen   Tablet .. 650 milliGRAM(s) every 6 hours PRN  dextrose 40% Gel 15 Gram(s) once PRN  diphenhydrAMINE 25 milliGRAM(s) every 6 hours PRN  diphtheria/tetanus/pertussis (acellular) Vaccine (ADAcel) 0.5 milliLiter(s) once  docusate sodium 100 milliGRAM(s) two times a day PRN  ferrous    sulfate 325 milliGRAM(s) daily  furosemide   Injectable 10 milliGRAM(s) once  glycerin Suppository - Adult 1 Suppository(s) at bedtime PRN  heparin  Injectable 7500 Unit(s) every 8 hours  ibuprofen  Tablet. 600 milliGRAM(s) every 6 hours PRN  insulin lispro (HumaLOG) corrective regimen sliding scale   Before meals and at bedtime  labetalol 300 milliGRAM(s) three times a day  lanolin Ointment 1 Application(s) every 3 hours PRN  levothyroxine 175 MICROGram(s) daily  naloxone Injectable 0.1 milliGRAM(s) every 3 minutes PRN  ondansetron Injectable 4 milliGRAM(s) every 6 hours PRN  oxyCODONE    5 mG/acetaminophen 325 mG 1 Tablet(s) every 3 hours PRN  oxyCODONE    5 mG/acetaminophen 325 mG 2 Tablet(s) every 6 hours PRN  oxytocin Infusion 41.667 milliUNIT(s)/Min <Continuous>  oxytocin Infusion 333.333 milliUNIT(s)/Min <Continuous>  oxytocin Infusion 41.667 milliUNIT(s)/Min <Continuous>  prenatal multivitamin 1 Tablet(s) daily  simethicone 80 milliGRAM(s) every 4 hours PRN  sodium chloride 0.65% Nasal 1 Spray(s) three times a day PRN      Allergies    No Known Allergies    Intolerances        T(C): , Max: 36.7 (19 @ 05:00)  T(F): , Max: 98.1 (19 @ 05:00)  HR: 91 (19 @ 08:00)  BP: 157/79 (19 @ 08:00)  BP(mean): 88 (19 @ 07:00)  RR: 21 (19 @ 08:00)  SpO2: 96% (19 @ 08:00)  Wt(kg): --     @ 07:01  -   @ 07:00  --------------------------------------------------------  IN:  Total IN: 0 mL    OUT:    Indwelling Catheter - Urethral: 1265 mL  Total OUT: 1265 mL    Total NET: -1265 mL       @ 07:01  -   @ 12:26  --------------------------------------------------------  IN:  Total IN: 0 mL    OUT:    Indwelling Catheter - Urethral: 75 mL    Voided: 400 mL  Total OUT: 475 mL    Total NET: -475 mL      Physical exam:   Alert and oriented   No JVD   Normal air entry into the lungs, no wheezing, no crackles   RRR, normal s1/s2, no murmurs, rubs, gallops   Abdomen - soft, not tender, not distended   Extremities: 1 + edema         LABS:                        9.0    16.55 )-----------( 226      ( 2019 05:56 )             27.3         135  |  100  |  9   ----------------------------<  154<H>  4.0   |  24  |  0.59    Ca    7.6<L>      2019 05:56  Mg     5.9         TPro  5.6<L>  /  Alb  2.8<L>  /  TBili  0.2  /  DBili  x   /  AST  28  /  ALT  21  /  AlkPhos  53      Uric Acid, Serum: 4.9 mg/dL [2.5 - 7.0] ( @ 05:56)  Hemoglobin A1C, Whole Blood: 6.7 % <H> [4.0 - 5.6] ( @ 05:56)    PT/INR - ( 2019 08:32 )   PT: 10.9 sec;   INR: 0.97          PTT - ( 2019 08:32 )  PTT:25.7 sec  Urinalysis Basic - ( 2019 23:23 )    Color: Yellow / Appearance: Clear / S.020 / pH: x  Gluc: x / Ketone: 40 mg/dL  / Bili: Negative / Urobili: 0.2 E.U./dL   Blood: x / Protein: NEGATIVE mg/dL / Nitrite: NEGATIVE   Leuk Esterase: NEGATIVE / RBC: > 10 /HPF / WBC < 5 /HPF   Sq Epi: x / Non Sq Epi: 0-5 /HPF / Bacteria: Present /HPF      Creatinine, Random Urine: 84 mg/dL ( @ 15:00)  Protein/Creatinine Ratio Calculation: 1.8 Ratio <H> [0.0 - 0.2] ( @ 15:00)        RADIOLOGY & ADDITIONAL STUDIES:

## 2019-02-24 NOTE — PROGRESS NOTE ADULT - SUBJECTIVE AND OBJECTIVE BOX
Patient evaluated at bedside.   She reports pain is well controlled with OPM.  Patient denies toxic complaints.  Rico in place, plan to remove this morning.  Magnesium stopped overnight, tolerated regular diet without n/v.  No flatus yet, not OOB yet, plan to get OOB to chair this morning.    She denies HA, dizziness, CP, palpitations, SOB, n/v, or heavy vaginal bleeding.    Physical Exam:  Vital Signs Last 24 Hrs  T(C): 36.7 (24 Feb 2019 05:00), Max: 36.7 (24 Feb 2019 05:00)  T(F): 98.1 (24 Feb 2019 05:00), Max: 98.1 (24 Feb 2019 05:00)  HR: 90 (24 Feb 2019 06:00) (78 - 97)  BP: 152/79 (24 Feb 2019 06:00) (108/51 - 166/85)  BP(mean): 97 (24 Feb 2019 06:00) (83 - 97)  RR: 21 (24 Feb 2019 06:00) (18 - 21)  SpO2: 97% (24 Feb 2019 06:00) (94% - 99%)    Gen: NAD  Pulm: CTA b/l  Abd: + BS, soft, nontender, obese, no rebound or guarding  Prevena in place  uterus firm at midline  : lochia WNL  Extremities: no swelling or calf tenderness                          9.0    16.55 )-----------( 226      ( 24 Feb 2019 05:56 )             27.3     02-24    135  |  100  |  9   ----------------------------<  154<H>  4.0   |  24  |  0.59    Ca    7.6<L>      24 Feb 2019 05:56  Mg     5.9     02-23    TPro  5.6<L>  /  Alb  2.8<L>  /  TBili  0.2  /  DBili  x   /  AST  28  /  ALT  21  /  AlkPhos  53  02-24      PT/INR - ( 23 Feb 2019 08:32 )   PT: 10.9 sec;   INR: 0.97          PTT - ( 23 Feb 2019 08:32 )  PTT:25.7 sec

## 2019-02-24 NOTE — PROVIDER CONTACT NOTE (OTHER) - ASSESSMENT
Pt is tachypneic with a RR of 21   O2 Sat 96%   Lungs are clear bilaterally  Pt denies chest pain or shortness of breath  Incentive Spirometer encouraged

## 2019-02-25 DIAGNOSIS — D64.9 ANEMIA, UNSPECIFIED: ICD-10-CM

## 2019-02-25 LAB
ALBUMIN SERPL ELPH-MCNC: 2.5 G/DL — LOW (ref 3.3–5)
ALP SERPL-CCNC: 46 U/L — SIGNIFICANT CHANGE UP (ref 40–120)
ALT FLD-CCNC: 19 U/L — SIGNIFICANT CHANGE UP (ref 10–45)
ANION GAP SERPL CALC-SCNC: 10 MMOL/L — SIGNIFICANT CHANGE UP (ref 5–17)
AST SERPL-CCNC: 19 U/L — SIGNIFICANT CHANGE UP (ref 10–40)
BILIRUB SERPL-MCNC: <0.2 MG/DL — SIGNIFICANT CHANGE UP (ref 0.2–1.2)
BUN SERPL-MCNC: 11 MG/DL — SIGNIFICANT CHANGE UP (ref 7–23)
CALCIUM SERPL-MCNC: 8 MG/DL — LOW (ref 8.4–10.5)
CHLORIDE SERPL-SCNC: 105 MMOL/L — SIGNIFICANT CHANGE UP (ref 96–108)
CO2 SERPL-SCNC: 24 MMOL/L — SIGNIFICANT CHANGE UP (ref 22–31)
CREAT SERPL-MCNC: 0.64 MG/DL — SIGNIFICANT CHANGE UP (ref 0.5–1.3)
GLUCOSE BLDC GLUCOMTR-MCNC: 116 MG/DL — HIGH (ref 70–99)
GLUCOSE BLDC GLUCOMTR-MCNC: 120 MG/DL — HIGH (ref 70–99)
GLUCOSE BLDC GLUCOMTR-MCNC: 126 MG/DL — HIGH (ref 70–99)
GLUCOSE BLDC GLUCOMTR-MCNC: 157 MG/DL — HIGH (ref 70–99)
GLUCOSE SERPL-MCNC: 137 MG/DL — HIGH (ref 70–99)
HCT VFR BLD CALC: 26.3 % — LOW (ref 34.5–45)
HGB BLD-MCNC: 8.2 G/DL — LOW (ref 11.5–15.5)
LDH SERPL L TO P-CCNC: 356 U/L — HIGH (ref 50–242)
MCHC RBC-ENTMCNC: 29.1 PG — SIGNIFICANT CHANGE UP (ref 27–34)
MCHC RBC-ENTMCNC: 31.2 GM/DL — LOW (ref 32–36)
MCV RBC AUTO: 93.3 FL — SIGNIFICANT CHANGE UP (ref 80–100)
NRBC # BLD: 0 /100 WBCS — SIGNIFICANT CHANGE UP (ref 0–0)
PLATELET # BLD AUTO: 226 K/UL — SIGNIFICANT CHANGE UP (ref 150–400)
POTASSIUM SERPL-MCNC: 4 MMOL/L — SIGNIFICANT CHANGE UP (ref 3.5–5.3)
POTASSIUM SERPL-SCNC: 4 MMOL/L — SIGNIFICANT CHANGE UP (ref 3.5–5.3)
PROT SERPL-MCNC: 5.4 G/DL — LOW (ref 6–8.3)
RBC # BLD: 2.82 M/UL — LOW (ref 3.8–5.2)
RBC # FLD: 14.9 % — HIGH (ref 10.3–14.5)
RUBV IGG SER-ACNC: 3.5 INDEX — SIGNIFICANT CHANGE UP
RUBV IGG SER-IMP: POSITIVE — SIGNIFICANT CHANGE UP
SODIUM SERPL-SCNC: 139 MMOL/L — SIGNIFICANT CHANGE UP (ref 135–145)
URATE SERPL-MCNC: 5.7 MG/DL — SIGNIFICANT CHANGE UP (ref 2.5–7)
WBC # BLD: 15.89 K/UL — HIGH (ref 3.8–10.5)
WBC # FLD AUTO: 15.89 K/UL — HIGH (ref 3.8–10.5)

## 2019-02-25 PROCEDURE — 71275 CT ANGIOGRAPHY CHEST: CPT | Mod: 26

## 2019-02-25 PROCEDURE — 99232 SBSQ HOSP IP/OBS MODERATE 35: CPT | Mod: GC

## 2019-02-25 PROCEDURE — 99233 SBSQ HOSP IP/OBS HIGH 50: CPT

## 2019-02-25 PROCEDURE — 93970 EXTREMITY STUDY: CPT | Mod: 26

## 2019-02-25 RX ORDER — FUROSEMIDE 40 MG
40 TABLET ORAL ONCE
Qty: 0 | Refills: 0 | Status: COMPLETED | OUTPATIENT
Start: 2019-02-25 | End: 2019-02-25

## 2019-02-25 RX ORDER — NIFEDIPINE 30 MG
30 TABLET, EXTENDED RELEASE 24 HR ORAL EVERY 24 HOURS
Qty: 0 | Refills: 0 | Status: DISCONTINUED | OUTPATIENT
Start: 2019-02-25 | End: 2019-02-26

## 2019-02-25 RX ORDER — TETANUS TOXOID, REDUCED DIPHTHERIA TOXOID AND ACELLULAR PERTUSSIS VACCINE, ADSORBED 5; 2.5; 8; 8; 2.5 [IU]/.5ML; [IU]/.5ML; UG/.5ML; UG/.5ML; UG/.5ML
0.5 SUSPENSION INTRAMUSCULAR ONCE
Qty: 0 | Refills: 0 | Status: COMPLETED | OUTPATIENT
Start: 2019-02-25 | End: 2019-02-25

## 2019-02-25 RX ADMIN — OXYCODONE AND ACETAMINOPHEN 1 TABLET(S): 5; 325 TABLET ORAL at 05:17

## 2019-02-25 RX ADMIN — Medication 100 MILLIGRAM(S): at 11:07

## 2019-02-25 RX ADMIN — Medication 175 MICROGRAM(S): at 07:02

## 2019-02-25 RX ADMIN — OXYCODONE AND ACETAMINOPHEN 1 TABLET(S): 5; 325 TABLET ORAL at 00:20

## 2019-02-25 RX ADMIN — Medication 600 MILLIGRAM(S): at 19:25

## 2019-02-25 RX ADMIN — HEPARIN SODIUM 7500 UNIT(S): 5000 INJECTION INTRAVENOUS; SUBCUTANEOUS at 04:36

## 2019-02-25 RX ADMIN — HEPARIN SODIUM 7500 UNIT(S): 5000 INJECTION INTRAVENOUS; SUBCUTANEOUS at 11:07

## 2019-02-25 RX ADMIN — SIMETHICONE 80 MILLIGRAM(S): 80 TABLET, CHEWABLE ORAL at 11:06

## 2019-02-25 RX ADMIN — Medication 325 MILLIGRAM(S): at 11:06

## 2019-02-25 RX ADMIN — Medication 40 MILLIGRAM(S): at 12:21

## 2019-02-25 RX ADMIN — Medication 600 MILLIGRAM(S): at 12:15

## 2019-02-25 RX ADMIN — Medication 600 MILLIGRAM(S): at 11:12

## 2019-02-25 RX ADMIN — TETANUS TOXOID, REDUCED DIPHTHERIA TOXOID AND ACELLULAR PERTUSSIS VACCINE, ADSORBED 0.5 MILLILITER(S): 5; 2.5; 8; 8; 2.5 SUSPENSION INTRAMUSCULAR at 07:02

## 2019-02-25 RX ADMIN — Medication 1 TABLET(S): at 11:06

## 2019-02-25 RX ADMIN — Medication 400 MILLIGRAM(S): at 14:42

## 2019-02-25 RX ADMIN — Medication 100 MILLIGRAM(S): at 22:56

## 2019-02-25 RX ADMIN — Medication 30 MILLIGRAM(S): at 10:56

## 2019-02-25 RX ADMIN — Medication 400 MILLIGRAM(S): at 08:42

## 2019-02-25 RX ADMIN — Medication 400 MILLIGRAM(S): at 22:56

## 2019-02-25 RX ADMIN — OXYCODONE AND ACETAMINOPHEN 1 TABLET(S): 5; 325 TABLET ORAL at 04:36

## 2019-02-25 RX ADMIN — Medication 600 MILLIGRAM(S): at 18:31

## 2019-02-25 RX ADMIN — HEPARIN SODIUM 7500 UNIT(S): 5000 INJECTION INTRAVENOUS; SUBCUTANEOUS at 18:32

## 2019-02-25 RX ADMIN — Medication 400 MILLIGRAM(S): at 04:36

## 2019-02-25 RX ADMIN — Medication 1: at 11:13

## 2019-02-25 NOTE — PROGRESS NOTE ADULT - SUBJECTIVE AND OBJECTIVE BOX
INTERVAL HPI/OVERNIGHT EVENTS: UZIEL     44 y/o F w/ Hx of pre gestational Dm2, Obesity and hypothyroidism. Consult requested for hyperglycemia.  Pt was admitted for urgent  delivery after being found to have pre-eclampsia. She was previously on metformin only prior to pregnancy. She was subsequently started on basal bolus regimen, which she was compliant w/ throughout her pregnancy. she requires up to Levemir 55 units BID and Humalog 40 units TIDAC. She reports not tolerating meals other than simple carbs throughout pregnancy. She now reports no n/v and is tolerating a diabetic diet well. She otherwise denies any f/c, CP, SOB, palpitations, stool/urinary changes, motor or sensory deficits.      Pt reports the following symptoms: Pt states she is feeling well     CONSTITUTIONAL:  Negative fever or chills, feels well, good appetite  EYES:  Negative  blurry vision or double vision  CARDIOVASCULAR:  Negative for chest pain or palpitations  RESPIRATORY:  Negative for cough, wheezing, or SOB   GASTROINTESTINAL:  Negative for nausea, vomiting, diarrhea, constipation, or abdominal pain  GENITOURINARY:  Negative frequency, urgency or dysuria  NEUROLOGIC:  No headache, confusion, dizziness, lightheadedness    MEDICATIONS  (STANDING):  ferrous    sulfate 325 milliGRAM(s) Oral daily  heparin  Injectable 7500 Unit(s) SubCutaneous every 8 hours  insulin lispro (HumaLOG) corrective regimen sliding scale   SubCutaneous Before meals and at bedtime  labetalol 400 milliGRAM(s) Oral four times a day  levothyroxine 175 MICROGram(s) Oral daily  NIFEdipine XL 30 milliGRAM(s) Oral every 24 hours  oxytocin Infusion 41.667 milliUNIT(s)/Min (125 mL/Hr) IV Continuous <Continuous>  oxytocin Infusion 333.333 milliUNIT(s)/Min (1000 mL/Hr) IV Continuous <Continuous>  oxytocin Infusion 41.667 milliUNIT(s)/Min (125 mL/Hr) IV Continuous <Continuous>  prenatal multivitamin 1 Tablet(s) Oral daily    MEDICATIONS  (PRN):  acetaminophen   Tablet .. 650 milliGRAM(s) Oral every 6 hours PRN Mild Pain (1 - 3)  dextrose 40% Gel 15 Gram(s) Oral once PRN Blood Glucose LESS THAN 70 milliGRAM(s)/deciliter  diphenhydrAMINE 25 milliGRAM(s) Oral every 6 hours PRN Itching  docusate sodium 100 milliGRAM(s) Oral two times a day PRN Stool Softening  glycerin Suppository - Adult 1 Suppository(s) Rectal at bedtime PRN Constipation  ibuprofen  Tablet. 600 milliGRAM(s) Oral every 6 hours PRN Mild Pain (1 - 3)  lanolin Ointment 1 Application(s) Topical every 3 hours PRN Sore Nipples  naloxone Injectable 0.1 milliGRAM(s) IV Push every 3 minutes PRN For ANY of the following changes in patient status:  A. RR LESS THAN 10 breaths per minute, B. Oxygen saturation LESS THAN 90%, C. Sedation score of 6  ondansetron Injectable 4 milliGRAM(s) IV Push every 6 hours PRN Nausea  oxyCODONE    5 mG/acetaminophen 325 mG 1 Tablet(s) Oral every 3 hours PRN Moderate Pain (4 - 6)  oxyCODONE    5 mG/acetaminophen 325 mG 2 Tablet(s) Oral every 6 hours PRN Severe Pain (7 - 10)  simethicone 80 milliGRAM(s) Chew every 4 hours PRN Gas  sodium chloride 0.65% Nasal 1 Spray(s) Both Nostrils three times a day PRN Nasal Congestion      PHYSICAL EXAM  Vital Signs Last 24 Hrs  T(C): 37 (2019 14:30), Max: 37.6 (2019 20:00)  T(F): 98.6 (2019 14:30), Max: 99.6 (2019 20:00)  HR: 84 (2019 15:30) (72 - 88)  BP: 146/88 (2019 15:30) (124/70 - 158/98)  BP(mean): --  RR: 18 (2019 15:30) (17 - 22)  SpO2: 96% (2019 15:30) (94% - 99%)    Constitutional: wn/wd in NAD.   HEENT: NCAT, MMM, OP clear, EOMI, no proptosis or lid retraction  Neck: no thyromegaly or palpable thyroid nodules   Respiratory: lungs CTAB.  Cardiovascular: regular rhythm, normal S1 and S2, no audible murmurs, no peripheral edema  GI: soft, NT/ND, no masses/HSM appreciated.  Neurology: no tremors, DTR 2+  Skin: no visible rashes/lesions  Psychiatric: AAO x 3, normal affect/mood.    LABS:                        8.2    15.89 )-----------( 226      ( 2019 07:14 )             26.3         139  |  105  |  11  ----------------------------<  137<H>  4.0   |  24  |  0.64    Ca    8.0<L>      2019 07:14    TPro  5.4<L>  /  Alb  2.5<L>  /  TBili  <0.2  /  DBili  x   /  AST  19  /  ALT  19  /  AlkPhos  46              HbA1C: 6.7 % ( @ 05:56)    CAPILLARY BLOOD GLUCOSE      POCT Blood Glucose.: 126 mg/dL (2019 16:46)  POCT Blood Glucose.: 157 mg/dL (2019 10:51)  POCT Blood Glucose.: 120 mg/dL (2019 06:38)  POCT Blood Glucose.: 163 mg/dL (2019 22:04)      Insulin Sliding Scale requirements X 24 Hours:    RADIOLOGY & ADDITIONAL TESTS:    44 y/o F w/ Hx of pre gestational Dm2, Obesity and hypothyroidism. Consult requested for hyperglycemia.      1.  Type 2 Diabetes    Pt fingersticks well controlled. Continue NISS as she is now mostly at target glycemic control. Target of 140-180.  - Advised that she continue to monitor FSG after discharge, if hyperglycemia higher than 180 occurs, advised that she restart Metformin 500 mg PO as outpatient.   - Pt follows with Dr. Frank castillo/ [facility] for outpt endocrinology     2. Hypothyroidism  Please continue original pre-pregnancy dose of synthroid 125 mcg     Will continue to monitor

## 2019-02-25 NOTE — CONSULT NOTE ADULT - SUBJECTIVE AND OBJECTIVE BOX
INTERVAL EVENTS:  Still short of breath while lying flat, which has improved somewhat after lasix yesterday.     PAST MEDICAL & SURGICAL HISTORY:  Hypothyroid  DM (diabetes mellitus) in pregnancy    MEDICATIONS  (STANDING):  ferrous    sulfate 325 milliGRAM(s) Oral daily  heparin  Injectable 7500 Unit(s) SubCutaneous every 8 hours  insulin lispro (HumaLOG) corrective regimen sliding scale   SubCutaneous Before meals and at bedtime  labetalol 400 milliGRAM(s) Oral four times a day  levothyroxine 175 MICROGram(s) Oral daily  NIFEdipine XL 30 milliGRAM(s) Oral every 24 hours  oxytocin Infusion 41.667 milliUNIT(s)/Min (125 mL/Hr) IV Continuous <Continuous>  oxytocin Infusion 333.333 milliUNIT(s)/Min (1000 mL/Hr) IV Continuous <Continuous>  oxytocin Infusion 41.667 milliUNIT(s)/Min (125 mL/Hr) IV Continuous <Continuous>  prenatal multivitamin 1 Tablet(s) Oral daily    MEDICATIONS  (PRN):  acetaminophen   Tablet .. 650 milliGRAM(s) Oral every 6 hours PRN Mild Pain (1 - 3)  dextrose 40% Gel 15 Gram(s) Oral once PRN Blood Glucose LESS THAN 70 milliGRAM(s)/deciliter  diphenhydrAMINE 25 milliGRAM(s) Oral every 6 hours PRN Itching  docusate sodium 100 milliGRAM(s) Oral two times a day PRN Stool Softening  glycerin Suppository - Adult 1 Suppository(s) Rectal at bedtime PRN Constipation  ibuprofen  Tablet. 600 milliGRAM(s) Oral every 6 hours PRN Mild Pain (1 - 3)  lanolin Ointment 1 Application(s) Topical every 3 hours PRN Sore Nipples  naloxone Injectable 0.1 milliGRAM(s) IV Push every 3 minutes PRN For ANY of the following changes in patient status:  A. RR LESS THAN 10 breaths per minute, B. Oxygen saturation LESS THAN 90%, C. Sedation score of 6  ondansetron Injectable 4 milliGRAM(s) IV Push every 6 hours PRN Nausea  oxyCODONE    5 mG/acetaminophen 325 mG 1 Tablet(s) Oral every 3 hours PRN Moderate Pain (4 - 6)  oxyCODONE    5 mG/acetaminophen 325 mG 2 Tablet(s) Oral every 6 hours PRN Severe Pain (7 - 10)  simethicone 80 milliGRAM(s) Chew every 4 hours PRN Gas  sodium chloride 0.65% Nasal 1 Spray(s) Both Nostrils three times a day PRN Nasal Congestion    Vital Signs Last 24 Hrs  T(C): 37 (25 Feb 2019 14:30), Max: 37.6 (24 Feb 2019 20:00)  T(F): 98.6 (25 Feb 2019 14:30), Max: 99.6 (24 Feb 2019 20:00)  HR: 84 (25 Feb 2019 15:30) (72 - 88)  BP: 146/88 (25 Feb 2019 15:30) (124/70 - 158/98)  BP(mean): --  RR: 18 (25 Feb 2019 15:30) (17 - 22)  SpO2: 96% (25 Feb 2019 15:30) (94% - 99%)    PHYSICAL EXAM:  GEN: Morbidly obese. Awake, alert. NAD.   HEENT: NCAT, PERRL, EOMI. Mucosa moist. JVP 20 cm H2O.  RESP: Mild bibasilar rales.  CV: RRR. Normal S1/S2. No m/r/g.  ABD: Soft. NT/ND. BS+  EXT: Warm. 4+ peripheral edema to the sacrum. No clubbing, or cyanosis.   NEURO: AAOx3. No focal deficits.     LABS:                        8.2    15.89 )-----------( 226      ( 25 Feb 2019 07:14 )             26.3     02-25    139  |  105  |  11  ----------------------------<  137<H>  4.0   |  24  |  0.64    Ca    8.0<L>      25 Feb 2019 07:14    TPro  5.4<L>  /  Alb  2.5<L>  /  TBili  <0.2  /  DBili  x   /  AST  19  /  ALT  19  /  AlkPhos  46  02-25      I&O's Summary    24 Feb 2019 07:01  -  25 Feb 2019 07:00  --------------------------------------------------------  IN: 0 mL / OUT: 3425 mL / NET: -3425 mL    25 Feb 2019 07:01  -  25 Feb 2019 16:17  --------------------------------------------------------  IN: 0 mL / OUT: 725 mL / NET: -725 mL      EKG: NSR    ECHO  < from: TTE Echo w/Cont Complete (02.23.19 @ 14:15) >  Interpretation Summary  Probably normal left atrial size, but not well imaged.Mild to moderate   left ventricular hypertrophy. Normal internal left ventricular dimensions.    The left ventricular wall motion is normal. The left ventricle is   hyperdynamic and the overall ejection fraction is increased (>75%).  Right atrial size is   normal.The right ventricle is normal in size and function.The aortic   valve is not well visualized.The number of aortic valve cusps cannot be   discerned. No aortic regurgitation noted.  Structurally normal mitral valve. There is   mild mitral regurgitation.Structurally normal tricuspid valve. There is mild   tricuspid regurgitation.Structurally normal pulmonic valve. No pulmonic   regurgitation noted.The pulmonary artery systolic pressure is estimated   to be 33 mmHg. There is no echocardiographic evidence for pulmonary   hypertension. No aortic root dilatation.Normal size aortic arch with no hemodynamic   evidence for coarctation. There is no pericardial effusion.    < end of copied text >

## 2019-02-25 NOTE — PROGRESS NOTE ADULT - PROBLEM SELECTOR PLAN 1
s/p magnesium drip  BP still not at goal despite being on labetalol 400 mg po 4 times a day  recommend adding nifedipine 30 mg po daily for now  continue monitoring BP   BP goal < 140/90    s/p IV lasix twice on 2/24  pt still complaining of Dyspnea mainly lying down===> recommend ruling out Pulmonary embolus with Ct angio as patient is high risk ( obesity + pregnancy )(case discussed with attending Dr Durant  also recommend lower extremities doppler s/p magnesium drip  BP still not at goal despite being on labetalol 400 mg po 4 times a day  recommend adding nifedipine 30 mg po daily for now  continue monitoring BP , BP goal < 140/90  will continue monitoring LFTs, platelets, cr    s/p IV lasix twice on 2/24  pt still complaining of Dyspnea mainly lying down===> recommend ruling out Pulmonary embolus with Ct angio as patient is high risk ( obesity + pregnancy )(case discussed with attending Dr Durant  also recommend lower extremities doppler

## 2019-02-25 NOTE — PROGRESS NOTE ADULT - SUBJECTIVE AND OBJECTIVE BOX
Patient had 1x severe BP to 161/104, on repeat 15 min later 141/87, patient continues to remain asymptomatic, will continue to monitor BP o/n and consider adjusting regimen if persistently elevated

## 2019-02-25 NOTE — PROGRESS NOTE ADULT - SUBJECTIVE AND OBJECTIVE BOX
Patient evaluated at bedside. She is doing well. She is on oxygen at the bedside (NC). O2 sat 97% with NC and 96% without, therefore removed. She reports pain is well controlled.  She denies headache, dizziness, chest pain, palpitations, nausea, vomiting or heavy vaginal bleeding. Occasional SOB when lying down.   She has been ambulating without assistance, voiding spontaneously, passing gas, tolerating regular diet.    Followed by cardiology, received lasix twice yesterday, echo performed. They are following. No need to consult pulm yet. Endocrine following, doing fingersticks and on synthroid.     Physical Exam:  Vital Signs Last 24 Hrs  T(C): 36.9 (25 Feb 2019 10:40), Max: 37.6 (24 Feb 2019 20:00)  T(F): 98.4 (25 Feb 2019 10:40), Max: 99.6 (24 Feb 2019 20:00)  HR: 78 (25 Feb 2019 10:40) (72 - 90)  BP: 126/72 (25 Feb 2019 10:40) (124/70 - 157/90)  RR: 19 (25 Feb 2019 10:40) (17 - 28)  SpO2: 96% (25 Feb 2019 10:40) (94% - 99%)    GA: NAD, A+0 x 3  Abd: soft, nontender, distended, no rebound or guarding, Prevena, +edema  : lochia WNL  Extremities: no swelling or no calf tenderness,                          8.2    15.89 )-----------( 226      ( 25 Feb 2019 07:14 )             26.3     02-25    139  |  105  |  11  ----------------------------<  137<H>  4.0   |  24  |  0.64    Ca    8.0<L>      25 Feb 2019 07:14  Mg     5.9     02-23    TPro  5.4<L>  /  Alb  2.5<L>  /  TBili  <0.2  /  DBili  x   /  AST  19  /  ALT  19  /  AlkPhos  46  02-25 PATIENT SEEN AT 7AM    Patient evaluated at bedside. She is doing well. She is on oxygen at the bedside (NC). O2 sat 97% with NC and 96% without, therefore removed. She reports pain is well controlled.  She denies headache, dizziness, chest pain, palpitations, nausea, vomiting or heavy vaginal bleeding. Occasional SOB when lying down.   She has been ambulating without assistance, voiding spontaneously, passing gas, tolerating regular diet.    Followed by cardiology, received lasix twice yesterday, echo performed. They are following. No need to consult pulm yet. Endocrine following, doing fingersticks and on synthroid.     Physical Exam:  Vital Signs Last 24 Hrs  T(C): 36.9 (25 Feb 2019 10:40), Max: 37.6 (24 Feb 2019 20:00)  T(F): 98.4 (25 Feb 2019 10:40), Max: 99.6 (24 Feb 2019 20:00)  HR: 78 (25 Feb 2019 10:40) (72 - 90)  BP: 126/72 (25 Feb 2019 10:40) (124/70 - 157/90)  RR: 19 (25 Feb 2019 10:40) (17 - 28)  SpO2: 96% (25 Feb 2019 10:40) (94% - 99%)    GA: NAD, A+0 x 3  Abd: soft, nontender, distended, no rebound or guarding, Prevena, +edema  : lochia WNL  Extremities: ++swelling or no calf tenderness                          8.2    15.89 )-----------( 226      ( 25 Feb 2019 07:14 )             26.3     02-25    139  |  105  |  11  ----------------------------<  137<H>  4.0   |  24  |  0.64    Ca    8.0<L>      25 Feb 2019 07:14  Mg     5.9     02-23    TPro  5.4<L>  /  Alb  2.5<L>  /  TBili  <0.2  /  DBili  x   /  AST  19  /  ALT  19  /  AlkPhos  46  02-25      MEDICATIONS  (STANDING):  ferrous    sulfate 325 milliGRAM(s) Oral daily  heparin  Injectable 7500 Unit(s) SubCutaneous every 8 hours  insulin lispro (HumaLOG) corrective regimen sliding scale   SubCutaneous Before meals and at bedtime  labetalol 400 milliGRAM(s) Oral four times a day  levothyroxine 175 MICROGram(s) Oral daily  NIFEdipine XL 30 milliGRAM(s) Oral every 24 hours  oxytocin Infusion 41.667 milliUNIT(s)/Min (125 mL/Hr) IV Continuous <Continuous>  oxytocin Infusion 333.333 milliUNIT(s)/Min (1000 mL/Hr) IV Continuous <Continuous>  oxytocin Infusion 41.667 milliUNIT(s)/Min (125 mL/Hr) IV Continuous <Continuous>  prenatal multivitamin 1 Tablet(s) Oral daily    MEDICATIONS  (PRN):  acetaminophen   Tablet .. 650 milliGRAM(s) Oral every 6 hours PRN Mild Pain (1 - 3)  dextrose 40% Gel 15 Gram(s) Oral once PRN Blood Glucose LESS THAN 70 milliGRAM(s)/deciliter  diphenhydrAMINE 25 milliGRAM(s) Oral every 6 hours PRN Itching  docusate sodium 100 milliGRAM(s) Oral two times a day PRN Stool Softening  glycerin Suppository - Adult 1 Suppository(s) Rectal at bedtime PRN Constipation  ibuprofen  Tablet. 600 milliGRAM(s) Oral every 6 hours PRN Mild Pain (1 - 3)  lanolin Ointment 1 Application(s) Topical every 3 hours PRN Sore Nipples  naloxone Injectable 0.1 milliGRAM(s) IV Push every 3 minutes PRN For ANY of the following changes in patient status:  A. RR LESS THAN 10 breaths per minute, B. Oxygen saturation LESS THAN 90%, C. Sedation score of 6  ondansetron Injectable 4 milliGRAM(s) IV Push every 6 hours PRN Nausea  oxyCODONE    5 mG/acetaminophen 325 mG 1 Tablet(s) Oral every 3 hours PRN Moderate Pain (4 - 6)  oxyCODONE    5 mG/acetaminophen 325 mG 2 Tablet(s) Oral every 6 hours PRN Severe Pain (7 - 10)  simethicone 80 milliGRAM(s) Chew every 4 hours PRN Gas  sodium chloride 0.65% Nasal 1 Spray(s) Both Nostrils three times a day PRN Nasal Congestion

## 2019-02-25 NOTE — PROGRESS NOTE ADULT - SUBJECTIVE AND OBJECTIVE BOX
Patient is a 43y Female seen and evaluated at bedside.   pt seen and examined  pt does reports dyspnea especially when lying down  however, as per PRimary team, patient is chronically dyspneic  pt also reports LE edema extending to abdomen   Patient did receive lasix twice on 2/24  She denies headaches, RUQ abd pain, blurry vision    labs noted: platelets @ 226, cr @ 0.64, LTS noted @ 19, LDH trending down  BP this am @ 157/90     acetaminophen   Tablet .. 650 every 6 hours PRN  dextrose 40% Gel 15 once PRN  diphenhydrAMINE 25 every 6 hours PRN  docusate sodium 100 two times a day PRN  ferrous    sulfate 325 daily  glycerin Suppository - Adult 1 at bedtime PRN  heparin  Injectable 7500 every 8 hours  ibuprofen  Tablet. 600 every 6 hours PRN  insulin lispro (HumaLOG) corrective regimen sliding scale  Before meals and at bedtime  labetalol 400 four times a day  lanolin Ointment 1 every 3 hours PRN  levothyroxine 175 daily  naloxone Injectable 0.1 every 3 minutes PRN  NIFEdipine XL 30 every 24 hours  ondansetron Injectable 4 every 6 hours PRN  oxyCODONE    5 mG/acetaminophen 325 mG 1 every 3 hours PRN  oxyCODONE    5 mG/acetaminophen 325 mG 2 every 6 hours PRN  oxytocin Infusion 41.667 <Continuous>  oxytocin Infusion 333.333 <Continuous>  oxytocin Infusion 41.667 <Continuous>  prenatal multivitamin 1 daily  simethicone 80 every 4 hours PRN  sodium chloride 0.65% Nasal 1 three times a day PRN      Allergies    No Known Allergies    Intolerances        T(C): , Max: 37.6 (02-24-19 @ 20:00)  T(F): , Max: 99.6 (02-24-19 @ 20:00)  HR: 78 (02-25-19 @ 10:40)  BP: 126/72 (02-25-19 @ 10:40)  BP(mean): --  RR: 19 (02-25-19 @ 10:40)  SpO2: 96% (02-25-19 @ 10:40)  Wt(kg): --    02-24 @ 07:01  -  02-25 @ 07:00  --------------------------------------------------------  IN: 0 mL / OUT: 3425 mL / NET: -3425 mL          Review of Systems:  CONSTITUTIONAL: No fever or chills, No fatigue or tiredness.  EYES: No blurred or double vision.  RESPIRATORY: No shortness of breath, cough, hemoptysis  CARDIOVASCULAR:+  shortness of breath  GASTROINTESTINAL: + mild abdominal pain around incision site   GENITOURINARY: No dysuria or urinary burning, No difficulty passing urine, No hematuria  NEUROLOGICAL: No headaches or blurred vision  SKIN: No skin rashes   MUSCULOSKELETAL: + LE edema       PHYSICAL EXAM:  GENERAL: NAD sitting on the chair, obese,  HEAD:  Atraumatic, Normocephalic,   EYES: Bilateral conjunctiva and sclera normal   Oral cavity: Oral mucosa dry and pink  NECK: Neck supple, No JVD  CHEST/LUNG: limited exam, unable to proper appreciate any rales  HEART: Regular rate and rhythm. JANENE II/VI at LPSB, No gallop, no rub   ABDOMEN: soft, + BS x 4 quadrants, edema more pronounced below umbilicus, + incision site   EXTREMITIES: + 2 pitting edema in the LE extending to abdomen   Neurology: AAOx3, no focal neurological deficit  SKIN: No rashes or lesions        LABS:                        8.2    15.89 )-----------( 226      ( 25 Feb 2019 07:14 )             26.3     02-25    139  |  105  |  11  ----------------------------<  137<H>  4.0   |  24  |  0.64    Ca    8.0<L>      25 Feb 2019 07:14  Mg     5.9     02-23    TPro  5.4<L>  /  Alb  2.5<L>  /  TBili  <0.2  /  DBili  x   /  AST  19  /  ALT  19  /  AlkPhos  46  02-25    Uric Acid, Serum: 5.7 mg/dL [2.5 - 7.0] (02-25 @ 07:14)              RADIOLOGY & ADDITIONAL STUDIES:

## 2019-02-26 ENCOUNTER — TRANSCRIPTION ENCOUNTER (OUTPATIENT)
Age: 44
End: 2019-02-26

## 2019-02-26 VITALS
HEART RATE: 83 BPM | RESPIRATION RATE: 18 BRPM | SYSTOLIC BLOOD PRESSURE: 167 MMHG | OXYGEN SATURATION: 97 % | DIASTOLIC BLOOD PRESSURE: 99 MMHG | TEMPERATURE: 98 F

## 2019-02-26 LAB
ALBUMIN SERPL ELPH-MCNC: 2.7 G/DL — LOW (ref 3.3–5)
ALP SERPL-CCNC: 49 U/L — SIGNIFICANT CHANGE UP (ref 40–120)
ALT FLD-CCNC: 22 U/L — SIGNIFICANT CHANGE UP (ref 10–45)
ANION GAP SERPL CALC-SCNC: 9 MMOL/L — SIGNIFICANT CHANGE UP (ref 5–17)
AST SERPL-CCNC: 19 U/L — SIGNIFICANT CHANGE UP (ref 10–40)
BILIRUB SERPL-MCNC: 0.2 MG/DL — SIGNIFICANT CHANGE UP (ref 0.2–1.2)
BUN SERPL-MCNC: 11 MG/DL — SIGNIFICANT CHANGE UP (ref 7–23)
CALCIUM SERPL-MCNC: 8.3 MG/DL — LOW (ref 8.4–10.5)
CHLORIDE SERPL-SCNC: 107 MMOL/L — SIGNIFICANT CHANGE UP (ref 96–108)
CO2 SERPL-SCNC: 26 MMOL/L — SIGNIFICANT CHANGE UP (ref 22–31)
CREAT SERPL-MCNC: 0.63 MG/DL — SIGNIFICANT CHANGE UP (ref 0.5–1.3)
GLUCOSE BLDC GLUCOMTR-MCNC: 116 MG/DL — HIGH (ref 70–99)
GLUCOSE BLDC GLUCOMTR-MCNC: 141 MG/DL — HIGH (ref 70–99)
GLUCOSE SERPL-MCNC: 120 MG/DL — HIGH (ref 70–99)
HCT VFR BLD CALC: 26.7 % — LOW (ref 34.5–45)
HGB BLD-MCNC: 8.3 G/DL — LOW (ref 11.5–15.5)
MAGNESIUM SERPL-MCNC: 1.8 MG/DL — SIGNIFICANT CHANGE UP (ref 1.6–2.6)
MCHC RBC-ENTMCNC: 29.1 PG — SIGNIFICANT CHANGE UP (ref 27–34)
MCHC RBC-ENTMCNC: 31.1 GM/DL — LOW (ref 32–36)
MCV RBC AUTO: 93.7 FL — SIGNIFICANT CHANGE UP (ref 80–100)
NRBC # BLD: 0 /100 WBCS — SIGNIFICANT CHANGE UP (ref 0–0)
PLATELET # BLD AUTO: 238 K/UL — SIGNIFICANT CHANGE UP (ref 150–400)
POTASSIUM SERPL-MCNC: 3.9 MMOL/L — SIGNIFICANT CHANGE UP (ref 3.5–5.3)
POTASSIUM SERPL-SCNC: 3.9 MMOL/L — SIGNIFICANT CHANGE UP (ref 3.5–5.3)
PROT SERPL-MCNC: 5.7 G/DL — LOW (ref 6–8.3)
RBC # BLD: 2.85 M/UL — LOW (ref 3.8–5.2)
RBC # FLD: 14.8 % — HIGH (ref 10.3–14.5)
SODIUM SERPL-SCNC: 142 MMOL/L — SIGNIFICANT CHANGE UP (ref 135–145)
WBC # BLD: 12.06 K/UL — HIGH (ref 3.8–10.5)
WBC # FLD AUTO: 12.06 K/UL — HIGH (ref 3.8–10.5)

## 2019-02-26 PROCEDURE — 85730 THROMBOPLASTIN TIME PARTIAL: CPT

## 2019-02-26 PROCEDURE — 71045 X-RAY EXAM CHEST 1 VIEW: CPT

## 2019-02-26 PROCEDURE — 88307 TISSUE EXAM BY PATHOLOGIST: CPT

## 2019-02-26 PROCEDURE — 83605 ASSAY OF LACTIC ACID: CPT

## 2019-02-26 PROCEDURE — 85027 COMPLETE CBC AUTOMATED: CPT

## 2019-02-26 PROCEDURE — 85610 PROTHROMBIN TIME: CPT

## 2019-02-26 PROCEDURE — 82570 ASSAY OF URINE CREATININE: CPT

## 2019-02-26 PROCEDURE — 99233 SBSQ HOSP IP/OBS HIGH 50: CPT

## 2019-02-26 PROCEDURE — 83615 LACTATE (LD) (LDH) ENZYME: CPT

## 2019-02-26 PROCEDURE — 85384 FIBRINOGEN ACTIVITY: CPT

## 2019-02-26 PROCEDURE — 86762 RUBELLA ANTIBODY: CPT

## 2019-02-26 PROCEDURE — 87340 HEPATITIS B SURFACE AG IA: CPT

## 2019-02-26 PROCEDURE — 87389 HIV-1 AG W/HIV-1&-2 AB AG IA: CPT

## 2019-02-26 PROCEDURE — C8929: CPT

## 2019-02-26 PROCEDURE — 93970 EXTREMITY STUDY: CPT

## 2019-02-26 PROCEDURE — 84550 ASSAY OF BLOOD/URIC ACID: CPT

## 2019-02-26 PROCEDURE — 85025 COMPLETE CBC W/AUTO DIFF WBC: CPT

## 2019-02-26 PROCEDURE — 82962 GLUCOSE BLOOD TEST: CPT

## 2019-02-26 PROCEDURE — 86780 TREPONEMA PALLIDUM: CPT

## 2019-02-26 PROCEDURE — 99214 OFFICE O/P EST MOD 30 MIN: CPT

## 2019-02-26 PROCEDURE — 71045 X-RAY EXAM CHEST 1 VIEW: CPT | Mod: 26

## 2019-02-26 PROCEDURE — 90715 TDAP VACCINE 7 YRS/> IM: CPT

## 2019-02-26 PROCEDURE — 80053 COMPREHEN METABOLIC PANEL: CPT

## 2019-02-26 PROCEDURE — 86901 BLOOD TYPING SEROLOGIC RH(D): CPT

## 2019-02-26 PROCEDURE — 86850 RBC ANTIBODY SCREEN: CPT

## 2019-02-26 PROCEDURE — 81001 URINALYSIS AUTO W/SCOPE: CPT

## 2019-02-26 PROCEDURE — 36415 COLL VENOUS BLD VENIPUNCTURE: CPT

## 2019-02-26 PROCEDURE — 71275 CT ANGIOGRAPHY CHEST: CPT

## 2019-02-26 PROCEDURE — 83036 HEMOGLOBIN GLYCOSYLATED A1C: CPT

## 2019-02-26 PROCEDURE — 86900 BLOOD TYPING SEROLOGIC ABO: CPT

## 2019-02-26 PROCEDURE — 84156 ASSAY OF PROTEIN URINE: CPT

## 2019-02-26 PROCEDURE — 83735 ASSAY OF MAGNESIUM: CPT

## 2019-02-26 RX ORDER — LEVOTHYROXINE SODIUM 125 MCG
0 TABLET ORAL
Qty: 0 | Refills: 0 | COMMUNITY

## 2019-02-26 RX ORDER — NIFEDIPINE 30 MG
1 TABLET, EXTENDED RELEASE 24 HR ORAL
Qty: 30 | Refills: 1 | OUTPATIENT
Start: 2019-02-26 | End: 2019-04-26

## 2019-02-26 RX ORDER — INSULIN LISPRO 100/ML
0 VIAL (ML) SUBCUTANEOUS
Qty: 0 | Refills: 0 | COMMUNITY

## 2019-02-26 RX ORDER — LABETALOL HCL 100 MG
300 TABLET ORAL EVERY 8 HOURS
Qty: 0 | Refills: 0 | Status: DISCONTINUED | OUTPATIENT
Start: 2019-02-26 | End: 2019-02-26

## 2019-02-26 RX ORDER — LABETALOL HCL 100 MG
1 TABLET ORAL
Qty: 90 | Refills: 1 | OUTPATIENT
Start: 2019-02-26 | End: 2019-04-26

## 2019-02-26 RX ADMIN — Medication 400 MILLIGRAM(S): at 04:40

## 2019-02-26 RX ADMIN — Medication 100 MILLIGRAM(S): at 10:37

## 2019-02-26 RX ADMIN — HEPARIN SODIUM 7500 UNIT(S): 5000 INJECTION INTRAVENOUS; SUBCUTANEOUS at 04:40

## 2019-02-26 RX ADMIN — Medication 175 MICROGRAM(S): at 06:40

## 2019-02-26 RX ADMIN — Medication 400 MILLIGRAM(S): at 10:36

## 2019-02-26 RX ADMIN — Medication 600 MILLIGRAM(S): at 00:45

## 2019-02-26 RX ADMIN — Medication 30 MILLIGRAM(S): at 10:53

## 2019-02-26 RX ADMIN — Medication 325 MILLIGRAM(S): at 10:53

## 2019-02-26 RX ADMIN — HEPARIN SODIUM 7500 UNIT(S): 5000 INJECTION INTRAVENOUS; SUBCUTANEOUS at 10:39

## 2019-02-26 RX ADMIN — Medication 1 TABLET(S): at 10:37

## 2019-02-26 RX ADMIN — Medication 600 MILLIGRAM(S): at 06:40

## 2019-02-26 RX ADMIN — Medication 600 MILLIGRAM(S): at 01:07

## 2019-02-26 NOTE — PROGRESS NOTE ADULT - SUBJECTIVE AND OBJECTIVE BOX
Patient evaluated at bedside at 10am.    She has been seen by multiple services. Cardiology clears patient to go home after chest x ray (performed this AM), CTA negative. Nephrology recommended a medication dose adjustment, then home with BP monitoring and VNS. Social work is working on this. Endocrine states the patient can go home on metformin and synthroid. The patient has these meds /will see her endocrinologist next week. The plan is to see her OB in 1 week and Nephrology in 2 weeks. Prevena care reviewed.     She reports pain is well controlled.  She denies headache, dizziness, chest pain, palpitations, shortness of breath, nausea, vomiting or heavy vaginal bleeding.  She has been ambulating without assistance, voiding spontaneously, passing gas, tolerating regular diet and is breastfeeding.    Physical Exam:  Vital Signs Last 24 Hrs  T(C): 36.7 (26 Feb 2019 10:00), Max: 37 (25 Feb 2019 14:30)  T(F): 98 (26 Feb 2019 10:00), Max: 98.6 (25 Feb 2019 14:30)  HR: 77 (26 Feb 2019 10:00) (69 - 88)  BP: 148/92 (26 Feb 2019 10:00) (125/78 - 161/104)  RR: 18 (26 Feb 2019 10:00) (18 - 19)  SpO2: 97% (26 Feb 2019 10:00) (96% - 99%)    GA: NAD, A+0 x 3  Abd:  soft, nontender, distended (panus), no rebound or guarding, prevena, still edematous  : lochia WNL  Extremities: swollen; no calf tenderness                            8.3    12.06 )-----------( 238      ( 26 Feb 2019 06:08 )             26.7     02-26    142  |  107  |  11  ----------------------------<  120<H>  3.9   |  26  |  0.63    Ca    8.3<L>      26 Feb 2019 06:08  Mg     1.8     02-26    TPro  5.7<L>  /  Alb  2.7<L>  /  TBili  0.2  /  DBili  x   /  AST  19  /  ALT  22  /  AlkPhos  49  02-26    Magnesium, Serum: 1.8 mg/dL (02-26 @ 06:08)      MEDICATIONS  (STANDING):  ferrous    sulfate 325 milliGRAM(s) Oral daily  heparin  Injectable 7500 Unit(s) SubCutaneous every 8 hours  insulin lispro (HumaLOG) corrective regimen sliding scale   SubCutaneous Before meals and at bedtime  labetalol 300 milliGRAM(s) Oral every 8 hours  levothyroxine 175 MICROGram(s) Oral daily  NIFEdipine XL 30 milliGRAM(s) Oral every 24 hours  prenatal multivitamin 1 Tablet(s) Oral daily    MEDICATIONS  (PRN):  dextrose 40% Gel 15 Gram(s) Oral once PRN Blood Glucose LESS THAN 70 milliGRAM(s)/deciliter  docusate sodium 100 milliGRAM(s) Oral two times a day PRN Stool Softening  ibuprofen  Tablet. 600 milliGRAM(s) Oral every 6 hours PRN Mild Pain (1 - 3)  lanolin Ointment 1 Application(s) Topical every 3 hours PRN Sore Nipples  simethicone 80 milliGRAM(s) Chew every 4 hours PRN Gas

## 2019-02-26 NOTE — DISCHARGE NOTE OB - HOSPITAL COURSE
Admitted for fetal monitoring. Found to have severe range BPs at 37w4d on admission. Unable to control BPs therefore decision for delivery via C/S. Received IV Mg. Postpartum BP controlled with oral medication. Patient evaluated by Nephrology, Cardiology, Endocrinology, OB, Social Work and cleared for discharge with close follow up.

## 2019-02-26 NOTE — DISCHARGE NOTE OB - MEDICATION SUMMARY - MEDICATIONS TO TAKE
I will START or STAY ON the medications listed below when I get home from the hospital:    labetalol 300 mg oral tablet  -- 1 tab(s) by mouth every 8 hours MDD:MDD: 3 tablets  -- It is very important that you take or use this exactly as directed.  Do not skip doses or discontinue unless directed by your doctor.  May cause drowsiness.  Alcohol may intensify this effect.  Use care when operating dangerous machinery.  Some non-prescription drugs may aggravate your condition.  Read all labels carefully.  If a warning appears, check with your doctor before taking.    -- Indication: For HTN    Nifedical XL 30 mg oral tablet, extended release  -- 1 tab(s) by mouth once a day MDD:MDD: 1 tablet  -- Avoid grapefruit and grapefruit juice while taking this medication.  It is very important that you take or use this exactly as directed.  Do not skip doses or discontinue unless directed by your doctor.  Some non-prescription drugs may aggravate your condition.  Read all labels carefully.  If a warning appears, check with your doctor before taking.  Swallow whole.  Do not crush.    -- Indication: For HTN

## 2019-02-26 NOTE — PROGRESS NOTE ADULT - ASSESSMENT
A&P: 43y Female   s/p CS @37w due to preeclampsia with severe reatures  - Preeclampsia: Continue IV Magnesium @2G/hr for 24 hrs post delivery. Severe range pressures in PACU requiring multiple pushes of IV Labetalol and PO Nifedipine.  Starting PO Labetalol for improved control, will continue to titrate; Magnesium clinical checks and serum assay q6 hrs.   First serum level subtherapeutic at 3.3, increasing rate to 3g/hr  - Endo: pregestational diabetic with unclear control, currently on ISS with fingersticks q4 hours, stable for now; endocrine consult in AM.  Also hypothyroid, on 300 synthroid during pregnancy, 175 prior to pregnancy  - Pulm: intermittent SOB, suspect underlying component of GRAEME in light of habitus   - Cardiovascular: cardiomegaly and pulmonary vascular congestion on verbal read of CXR, pending formal read, likely repeat imaging and possible echo given risk factors  - VTE: SCDs, SQH   - GI: Diet: clears as tolerated if not nauseous  - Neuro: Oral pain medications as needed: hold NSAIDs  - : strict Is and Os, D/C rubio after discontinuation of IV Magnesium  - Discharge planning: discussed importance of BP check at home, will be evaluated by PP nursing re need for VNS. Discussed close follow up with OB within 1-2 wks. Reviewed toxic complaints with patient.
A&P: 43y Female POD#2 s/p CS @37w due to preeclampsia with severe features.  Overall stable, but will continue to monitor closely.    1. Preeclampsia with severe features: s/p 24 hours IV Magnesium post delivery.  Has not required any further pushes on today's shift.  s/p Nephrology Consult.  Increasing Labetalol to 400mg qID.  s/p a-line  2. Endo: pregestational T2DM (pre-pregnancy on Metformin 1000mg); s/p endocrinology consult - continue ISS, fasting FS + premeal and cover as needed; likely restart metformin on discharge.     Hypothyroid - Continue pre-pregnancy synthroid, 175mcg qD  3.  Pulm: intermittent SOB, suspect underlying component of GRAEME in light of habitus; Satting > 95% on RA  4.  Cardiovascular: cardiomegaly and pulmonary vascular congestion on verbal read of CXR  s/p Cardiology Consult, echo demonstrating hyperdynamic heart (EF 75%), but overall wnl.  symptomatic with orthopnea, requiring O2 supplementation. received Lasix 10mg for orthopnea per cardiology recs; can double dose to 20mg if still symptomatic  5. GI: Advanced to Diabetic Regular, tolerating without issues.  6. VTE: SCDs, SQH 7500 TID given BMI  7. Neuro: Oral pain medications as needed: hold NSAIDs  8. : s/p rubio, voiding  9. Discharge planning: discussed importance of BP check at home, will be evaluated by PP nursing re need for VNS. Discussed close follow up with OB within 1-2 wks. Reviewed toxic complaints with patient.
A&P: 43y Female   s/p CS @37w due to preeclampsia with severe reatures  - Preeclampsia: Continue IV Magnesium @2G/hr for 24 hrs post delivery. Severe range pressures in PACU requiring multiple pushes of IV Labetalol and PO Nifedipine.  Starting PO Labetalol for improved control, will continue to titrate; Magnesium clinical checks and serum assay q6 hrs.   First serum level subtherapeutic at 3.3, increasing rate to 3g/hr  - Endo: pregestational diabetic with unclear control, currently on ISS with fingersticks q4 hours, stable for now; endocrine consult in AM  - VTE: SCDs, SQH   - GI: Diet: clears as tolerated if not nauseous  - Neuro: Oral pain medications as needed: hold NSAIDs  - : strict Is and Os, D/C rubio after discontinuation of IV Magnesium  - Discharge planning: discussed importance of BP check at home, will be evaluated by PP nursing re need for VNS. Discussed close follow up with OB within 1-2 wks. Reviewed toxic complaints with patient.
A&P: 43y Female POD#2 s/p CS @37w due to preeclampsia with severe features.  Overall stable, but will continue to monitor closely.    1. Preeclampsia with severe features: s/p 24 hours IV Magnesium post delivery.  s/p Nephrology Consult. Continue Labetalol 300mg TID, currently pressures are mild range, labs stable no toxic complaints, continue to monitor.  Plan to remove A-line this morning.  2. Endo: pregestational T2DM (pre-pregnancy on Metformin 1000mg); s/p endocrinology consult - continue ISS, fasting FS + premeal and cover as needed; appreciate recs  Hypothyroid - Continue pre-pregnancy synthroid, 175mcg qD  3.  Pulm: intermittent SOB, suspect underlying component of GRAEME in light of habitus; Satting > 95% on RA  4.  Cardiovascular: cardiomegaly and pulmonary vascular congestion on verbal read of CXR  s/p Cardiology Consult, echo demonstrating hyperdynamic heart (EF 75%), but overall wnl.  No intervention at this time  5. GI: Advanced to Diabetic Regular, tolerating without issues.  6. VTE: SCDs, SQH 7500 TID given BMI  7. Neuro: Oral pain medications as needed: hold NSAIDs  8. : d/c rubio this morning, then plan for TOV  9. Discharge planning: discussed importance of BP check at home, will be evaluated by PP nursing re need for VNS. Discussed close follow up with OB within 1-2 wks. Reviewed toxic complaints with patient.
POD4 C/S for uncontrolled BPs at 37w4d, received IV mg for 24 hrs, seen by multiple services to control maternal medical complications  - milestones met  - discharge home w/ close follow up
This is 43 year old female with pre eclampsia without in the moment with end organ damages.   nephrology consult is placed for preeclampsia management  patient is s/p magnesium drip
This is 43 year old female with pre eclampsia without in the moment with end organ damages.   nephrology consult is placed for preeclampsia management  patient is s/p magnesium drip
This is 43 year old female with pre eclampsia without in the moment with end organ damages. The magnesium drip stopped, asymptomatic in the morning, overnight once short of breath, now with BP from monitor 150/90, several reading of BP overnight between 150 to 160 over 90. I will increase the labetolol to 400 mg every 6 hours, no objection to get lasix 10 mg ivp still fluid overload from the magensium drip. if the BP still not control in the afternoon - i will start nifedipine 30 mg po
44yo P1 POD3 c/s for uncontrolled BPs at 37w4d, pregnancy c/b uncontrolled DM and HTN  - seen by cardiology, rec an additional 40mg of lasix now, will order  - CTA per nephrology  - reg diabetic diet  - ambulate/heparin  - po pain control

## 2019-02-26 NOTE — PROGRESS NOTE ADULT - PROBLEM SELECTOR PLAN 1
s/p magnesium drip  labs reviewed: platelets, cr, lfts wnl  BP readings reviewed-- < 140/90  plan for discharge today  pt should be discharged on nifedipine 30 mg po daily along with labetalol 300 mg po TID  pt should check her Bp @ home  she should follow with either PCP or OB within 1 week.  She should follow with Carleen Junior within 2 weeks  low sodium diet

## 2019-02-26 NOTE — CONSULT NOTE ADULT - ASSESSMENT
42 yo F with morbid obesity, and gestational DM who presented for emergent  in the setting of pre-eclampsia with severe features. Currently she remains with anasarca and pulmonary vascular congestion that explains her orthopnea. She is improving with diuretics and does not have exertional symptoms.     - Give lasix 40 IV once today. Diuretics in this setting are given only for respiratory symptoms as pre-eclamptics will auto-diurese the remainder of fluid with time.   - Check BMP in AM to monitor lytes. Keep K>4, Mg>2  - Monitor I/Os  - Repeat CXR in AM  - BP well controlled with Labetolol 400 Q6. Agree with adding Nifedipine as needed.   - Discussed with Dr. Benji Briones MD  Cardiology Fellow  [phone]
42 yo F with morbid obesity, and gestational DM who presented for emergent  in the setting of pre-eclampsia with severe features. Currently she remains with anasarca, however pulmonary vascular congestion is greatly improved. She is no longer symptomatic.     - No need for diuretics at this time  - BP well controlled with Labetolol and Nifedipine  - Please make an appointment to follow up with Dr. Leblanc in 1-2 weeks  	Phone: [phone]   	[facility]   	[address] [place] [address]  - Discussed with Dr. Benji Briones MD  Cardiology Fellow  [phone]
This is 43 year old female with pre eclampsia without in the moment with end organ damages. Now on magnesium drip and labetolol which can be increased to 300 mg three times a day

## 2019-02-26 NOTE — DISCHARGE NOTE OB - PLAN OF CARE
healthy recovery!! 1. Take your BP with the cuff or have a visiting nurse check it 2. Call your Dr immediately if the BP is 160/110 or higher. 3. Return to the hospital if you have severe headache, blurry vision, severe abdominal pain, excessive vaginal bleeding, fever, chest pain, shortness of breath. 4. Take a shower (no bath). 5. The Prevena dressing will be removed in the office. 6. Go see Dr. Salguero early next week. 7. See the Nephrologist in 2 weeks. 8. See the Endocrinologist in 1 week. **Call to make all 3 of these appointments. 9. Eat a regular, healthy, low sugar diet. 10. Nothing in the vagina for 6 weeks; decide on contraception before initiating intercourse.

## 2019-02-26 NOTE — PROGRESS NOTE ADULT - ATTENDING COMMENTS
Pt seen on rounds this afternoon.  Was on large doses of insulin prior to delivery, and has followed the usual pattern of a marked increase in insulin sensitivity post-partum.  Glucoses have been mostly below 150, and she has received small doses of insulin coverage for the ones slightly above this range.   If current pattern continues, she should return to therapy with metformin post discharge.  She is back on her pre-pregnancy dose of levothyroxine
Pt has been seen and examined.  I agree with the above assessment and plan.  Meds, s/s of PEC and post op complications have been reviewed.  Consultations are appreciated.  Pt is stable for discharge home.  Will follow up in 1 week as an outpatient.
Agree with above assessment.  Cardiology to follow up today.   On labetalol, if BPs still increased, will increase labetalol and add nifedipine.  Once BPs under control, will transfer to floor
Patient seen and evaluated.  Agree with above assessment.  She is feeling well this morning.   Will continue IV MG for 24 hours.   Will get endocrine for DM
I independently performed the key portions of the evaluation and management service provided. I agree with the above history, physical, and plan which I have reviewed and edited where appropriate. I find HTN, fluid overload. Cont BP meds. Lasix x 1.  See full note.

## 2019-02-26 NOTE — DISCHARGE NOTE OB - CARE PROVIDERS DIRECT ADDRESSES
,DirectAddress_Unknown ,DirectAddress_Unknown,[email or web address]

## 2019-02-26 NOTE — DISCHARGE NOTE OB - CARE PLAN
Principal Discharge DX:	Pre-eclampsia in third trimester  Goal:	healthy recovery!!  Assessment and plan of treatment:	1. Take your BP with the cuff or have a visiting nurse check it 2. Call your Dr immediately if the BP is 160/110 or higher. 3. Return to the hospital if you have severe headache, blurry vision, severe abdominal pain, excessive vaginal bleeding, fever, chest pain, shortness of breath. 4. Take a shower (no bath). 5. The Prevena dressing will be removed in the office. 6. Go see Dr. Salguero early next week. 7. See the Nephrologist in 2 weeks. 8. See the Endocrinologist in 1 week. **Call to make all 3 of these appointments. 9. Eat a regular, healthy, low sugar diet. 10. Nothing in the vagina for 6 weeks; decide on contraception before initiating intercourse.

## 2019-02-26 NOTE — PROGRESS NOTE ADULT - SUBJECTIVE AND OBJECTIVE BOX
Patient is a 43y Female seen and evaluated at bedside.   pt seen and examined  labs noted  Bp better  pt states dyspnea improved  denies headaches, blurry vision, RUQ abd pain  still complains of LE edema    docusate sodium 100 two times a day PRN  ferrous    sulfate 325 daily  ibuprofen  Tablet. 600 every 6 hours PRN  labetalol 300 every 8 hours  lanolin Ointment 1 every 3 hours PRN  levothyroxine 175 daily  NIFEdipine XL 30 every 24 hours  prenatal multivitamin 1 daily  simethicone 80 every 4 hours PRN      Allergies    No Known Allergies    Intolerances        T(C): , Max: 37 (02-25-19 @ 14:30)  T(F): , Max: 98.6 (02-25-19 @ 14:30)  HR: 77 (02-26-19 @ 10:00)  BP: 148/92 (02-26-19 @ 10:00)  BP(mean): --  RR: 18 (02-26-19 @ 10:00)  SpO2: 97% (02-26-19 @ 10:00)  Wt(kg): --    02-25 @ 07:01  -  02-26 @ 07:00  --------------------------------------------------------  IN: 480 mL / OUT: 1475 mL / NET: -995 mL    02-26 @ 07:01  -  02-26 @ 12:12  --------------------------------------------------------  IN: 0 mL / OUT: 300 mL / NET: -300 mL          Review of Systems:  CONSTITUTIONAL: No fever or chills, No fatigue or tiredness.  EYES: No blurred or double vision.  RESPIRATORY: mild shortness of breath, cough, hemoptysis  CARDIOVASCULAR: No Chest pain or shortness of breath  GASTROINTESTINAL: NO abdominal or flank pain, No nausea or vomiting, No diarrhea  GENITOURINARY: No dysuria or urinary burning, No difficulty passing urine, No hematuria  NEUROLOGICAL: No headaches or blurred vision  SKIN: No skin rashes   MUSCULOSKELETAL: + edema      PHYSICAL EXAM:  GENERAL: NAD, obese  HEAD:  Atraumatic, Normocephalic,   EYES: Bilateral conjunctiva and sclera normal   Oral cavity: Oral mucosa dry and pink  NECK: Neck supple, No JVD  CHEST/LUNG: Clear to auscultation bilaterally; No wheeze, no rales, no crepitations  HEART: Regular rate and rhythm. JANENE II/VI at LPSB, No gallop, no rub   ABDOMEN: Soft, Nontender, BS+nt, No flank tenderness.   EXTREMITIES: + 2 pitting edema BLT LE   Neurology: AAOx3, no focal neurological deficit  SKIN: No rashes or lesions      LABS:                        8.3    12.06 )-----------( 238      ( 26 Feb 2019 06:08 )             26.7     02-26    142  |  107  |  11  ----------------------------<  120<H>  3.9   |  26  |  0.63    Ca    8.3<L>      26 Feb 2019 06:08  Mg     1.8     02-26    TPro  5.7<L>  /  Alb  2.7<L>  /  TBili  0.2  /  DBili  x   /  AST  19  /  ALT  22  /  AlkPhos  49  02-26                RADIOLOGY & ADDITIONAL STUDIES:

## 2019-02-26 NOTE — DISCHARGE NOTE OB - PATIENT PORTAL LINK FT
You can access the [facility] Patient Portal, offered by [facility], by registering with the following website: [email or web address]

## 2019-02-26 NOTE — DISCHARGE NOTE OB - PROVIDER TOKENS
PROVIDER:[TOKEN:[[id number]]] PROVIDER:[TOKEN:[[id number]]],PROVIDER:[TOKEN:[[id number]]]

## 2019-02-26 NOTE — PROGRESS NOTE ADULT - SUBJECTIVE AND OBJECTIVE BOX
Patient is a 43y Female seen and evaluated at bedside.   pt seen and examined      docusate sodium 100 two times a day PRN  ferrous    sulfate 325 daily  ibuprofen  Tablet. 600 every 6 hours PRN  labetalol 300 every 8 hours  lanolin Ointment 1 every 3 hours PRN  levothyroxine 175 daily  NIFEdipine XL 30 every 24 hours  prenatal multivitamin 1 daily  simethicone 80 every 4 hours PRN      Allergies    No Known Allergies    Intolerances        T(C): , Max: 37 (02-25-19 @ 14:30)  T(F): , Max: 98.6 (02-25-19 @ 14:30)  HR: 77 (02-26-19 @ 10:00)  BP: 148/92 (02-26-19 @ 10:00)  BP(mean): --  RR: 18 (02-26-19 @ 10:00)  SpO2: 97% (02-26-19 @ 10:00)  Wt(kg): --    02-25 @ 07:01  -  02-26 @ 07:00  --------------------------------------------------------  IN: 480 mL / OUT: 1475 mL / NET: -995 mL    02-26 @ 07:01  -  02-26 @ 12:10  --------------------------------------------------------  IN: 0 mL / OUT: 300 mL / NET: -300 mL          Review of Systems:  CONSTITUTIONAL: No fever or chills, No fatigue or tiredness.  EYES: No blurred or double vision.  RESPIRATORY: No shortness of breath, cough, hemoptysis  CARDIOVASCULAR: No Chest pain or shortness of breath  GASTROINTESTINAL: NO abdominal or flank pain, No nausea or vomiting, No diarrhea  GENITOURINARY: No dysuria or urinary burning, No difficulty passing urine, No hematuria  NEUROLOGICAL: No headaches or blurred vision  SKIN: No skin rashes   MUSCULOSKELETAL: No arthralgia, Joint pain, leg edema, No muscle pains      PHYSICAL EXAM:  GENERAL: NAD, well-developed, well nourished, alert, awake, no acute distress at present  HEAD:  Atraumatic, Normocephalic,   EYES: Bilateral conjuctiva and sclera normal   Oral cavity: Oral mucosa dry and pink  NECK: Neck supple, No JVD  CHEST/LUNG: Clear to auscultation bilaterally; No wheeze, no rales, no crepitations  HEART: Regular rate and rhythm. JANENE II/VI at LPSB, No gallop, no rub   ABDOMEN: Soft, Nontender, BS+nt, No flank tenderness.   EXTREMITIES: No clubbing, cyanosis, or edema  Neurology: AAOx3, no focal neurological deficit  SKIN: No rashes or lesions          LABS:                        8.3    12.06 )-----------( 238      ( 26 Feb 2019 06:08 )             26.7     02-26    142  |  107  |  11  ----------------------------<  120<H>  3.9   |  26  |  0.63    Ca    8.3<L>      26 Feb 2019 06:08  Mg     1.8     02-26    TPro  5.7<L>  /  Alb  2.7<L>  /  TBili  0.2  /  DBili  x   /  AST  19  /  ALT  22  /  AlkPhos  49  02-26                RADIOLOGY & ADDITIONAL STUDIES:

## 2019-02-26 NOTE — CONSULT NOTE ADULT - SUBJECTIVE AND OBJECTIVE BOX
INTERVAL EVENTS:  Feeling better today. No longer complaining of orthopnea BP well controlled.    PAST MEDICAL & SURGICAL HISTORY:  Hypothyroid  DM (diabetes mellitus) in pregnancy      MEDICATIONS  (STANDING):  ferrous    sulfate 325 milliGRAM(s) Oral daily  labetalol 300 milliGRAM(s) Oral every 8 hours  levothyroxine 175 MICROGram(s) Oral daily  NIFEdipine XL 30 milliGRAM(s) Oral every 24 hours  prenatal multivitamin 1 Tablet(s) Oral daily    MEDICATIONS  (PRN):  docusate sodium 100 milliGRAM(s) Oral two times a day PRN Stool Softening  ibuprofen  Tablet. 600 milliGRAM(s) Oral every 6 hours PRN Mild Pain (1 - 3)  lanolin Ointment 1 Application(s) Topical every 3 hours PRN Sore Nipples  simethicone 80 milliGRAM(s) Chew every 4 hours PRN Gas    Vital Signs Last 24 Hrs  T(C): 36.7 (26 Feb 2019 10:00), Max: 36.8 (26 Feb 2019 06:00)  T(F): 98 (26 Feb 2019 10:00), Max: 98.3 (26 Feb 2019 06:00)  HR: 77 (26 Feb 2019 10:00) (69 - 88)  BP: 148/92 (26 Feb 2019 10:00) (125/78 - 161/104)  BP(mean): --  RR: 18 (26 Feb 2019 10:00) (18 - 19)  SpO2: 97% (26 Feb 2019 10:00) (96% - 99%)    PHYSICAL EXAM:  GEN: Awake, alert. NAD.   HEENT: NCAT, PERRL, EOMI. Mucosa moist. JVD 15 cm H2O.  RESP: CTA b/l  CV: RRR. Normal S1/S2. No m/r/g.  ABD: Soft. NT/ND. BS+  EXT: Warm. 3+ edema, clubbing, or cyanosis.   NEURO: AAOx3. No focal deficits.     LABS:                        8.3    12.06 )-----------( 238      ( 26 Feb 2019 06:08 )             26.7     02-26    142  |  107  |  11  ----------------------------<  120<H>  3.9   |  26  |  0.63    Ca    8.3<L>      26 Feb 2019 06:08  Mg     1.8     02-26    TPro  5.7<L>  /  Alb  2.7<L>  /  TBili  0.2  /  DBili  x   /  AST  19  /  ALT  22  /  AlkPhos  49  02-26            I&O's Summary    25 Feb 2019 07:01  -  26 Feb 2019 07:00  --------------------------------------------------------  IN: 480 mL / OUT: 1475 mL / NET: -995 mL    26 Feb 2019 07:01  -  26 Feb 2019 14:58  --------------------------------------------------------  IN: 0 mL / OUT: 300 mL / NET: -300 mL      RADIOLOGY & ADDITIONAL STUDIES:  TELEMETRY:  EKG: INTERVAL EVENTS:  Feeling better today. No longer complaining of orthopnea BP well controlled.    PAST MEDICAL & SURGICAL HISTORY:  Hypothyroid  DM (diabetes mellitus) in pregnancy      MEDICATIONS  (STANDING):  ferrous    sulfate 325 milliGRAM(s) Oral daily  labetalol 300 milliGRAM(s) Oral every 8 hours  levothyroxine 175 MICROGram(s) Oral daily  NIFEdipine XL 30 milliGRAM(s) Oral every 24 hours  prenatal multivitamin 1 Tablet(s) Oral daily    MEDICATIONS  (PRN):  docusate sodium 100 milliGRAM(s) Oral two times a day PRN Stool Softening  ibuprofen  Tablet. 600 milliGRAM(s) Oral every 6 hours PRN Mild Pain (1 - 3)  lanolin Ointment 1 Application(s) Topical every 3 hours PRN Sore Nipples  simethicone 80 milliGRAM(s) Chew every 4 hours PRN Gas    Vital Signs Last 24 Hrs  T(C): 36.7 (26 Feb 2019 10:00), Max: 36.8 (26 Feb 2019 06:00)  T(F): 98 (26 Feb 2019 10:00), Max: 98.3 (26 Feb 2019 06:00)  HR: 77 (26 Feb 2019 10:00) (69 - 88)  BP: 148/92 (26 Feb 2019 10:00) (125/78 - 161/104)  BP(mean): --  RR: 18 (26 Feb 2019 10:00) (18 - 19)  SpO2: 97% (26 Feb 2019 10:00) (96% - 99%)    PHYSICAL EXAM:  GEN: Awake, alert. NAD.   HEENT: NCAT, PERRL, EOMI. Mucosa moist. JVD 15 cm H2O.  RESP: CTA b/l  CV: RRR. Normal S1/S2. No m/r/g.  ABD: Soft. NT/ND. BS+  EXT: Warm. 3+ edema, clubbing, or cyanosis.   NEURO: AAOx3. No focal deficits.     LABS:                        8.3    12.06 )-----------( 238      ( 26 Feb 2019 06:08 )             26.7     02-26    142  |  107  |  11  ----------------------------<  120<H>  3.9   |  26  |  0.63    Ca    8.3<L>      26 Feb 2019 06:08  Mg     1.8     02-26    TPro  5.7<L>  /  Alb  2.7<L>  /  TBili  0.2  /  DBili  x   /  AST  19  /  ALT  22  /  AlkPhos  49  02-26      I&O's Summary    25 Feb 2019 07:01  -  26 Feb 2019 07:00  --------------------------------------------------------  IN: 480 mL / OUT: 1475 mL / NET: -995 mL    26 Feb 2019 07:01  -  26 Feb 2019 14:58  --------------------------------------------------------  IN: 0 mL / OUT: 300 mL / NET: -300 mL    CXR  < from: Xray Chest 1 View- PORTABLE-Routine (02.26.19 @ 07:37) >  IMPRESSION:    Support Devices: None  Heart: Likely exaggerated by the AP projection.  Mediastinum:  Unremarkable  Lungs/Airways:  Unremarkable  Bones/Soft tissues: Unremarkable    < end of copied text >

## 2019-02-26 NOTE — DISCHARGE NOTE OB - CARE PROVIDER_API CALL
Davina Salguero)  Obstetrics and Gynecology  [address] [address]  Phone: [phone]  Fax: [phone]  Follow Up Time: Davina Salguero)  Obstetrics and Gynecology  [address] [address]  Phone: [phone]  Fax: [phone]  Follow Up Time:     Lupe Cruz)  Internal Medicine; Nephrology  [address] [place] [address]  Phone: [phone]  Fax: [phone]  Follow Up Time:

## 2019-02-27 ENCOUNTER — INBOUND DOCUMENT (OUTPATIENT)
Age: 44
End: 2019-02-27

## 2019-02-27 ENCOUNTER — INPATIENT (INPATIENT)
Facility: HOSPITAL | Age: 44
LOS: 2 days | Discharge: HOME | DRG: 776 | End: 2019-03-02
Attending: OBSTETRICS & GYNECOLOGY | Admitting: OBSTETRICS & GYNECOLOGY
Payer: COMMERCIAL

## 2019-02-27 VITALS
DIASTOLIC BLOOD PRESSURE: 125 MMHG | OXYGEN SATURATION: 95 % | HEIGHT: 64 IN | TEMPERATURE: 98 F | SYSTOLIC BLOOD PRESSURE: 216 MMHG | RESPIRATION RATE: 20 BRPM | HEART RATE: 84 BPM | WEIGHT: 293 LBS

## 2019-02-27 DIAGNOSIS — Z98.891 HISTORY OF UTERINE SCAR FROM PREVIOUS SURGERY: Chronic | ICD-10-CM

## 2019-02-27 LAB
ALBUMIN SERPL ELPH-MCNC: 3.1 G/DL — LOW (ref 3.3–5)
ALP SERPL-CCNC: 60 U/L — SIGNIFICANT CHANGE UP (ref 40–120)
ALT FLD-CCNC: 37 U/L — SIGNIFICANT CHANGE UP (ref 10–45)
ANION GAP SERPL CALC-SCNC: 11 MMOL/L — SIGNIFICANT CHANGE UP (ref 5–17)
APTT BLD: 29.1 SEC — SIGNIFICANT CHANGE UP (ref 27.5–36.3)
AST SERPL-CCNC: 29 U/L — SIGNIFICANT CHANGE UP (ref 10–40)
BASOPHILS # BLD AUTO: 0.05 K/UL — SIGNIFICANT CHANGE UP (ref 0–0.2)
BASOPHILS NFR BLD AUTO: 0.4 % — SIGNIFICANT CHANGE UP (ref 0–2)
BILIRUB SERPL-MCNC: 0.2 MG/DL — SIGNIFICANT CHANGE UP (ref 0.2–1.2)
BUN SERPL-MCNC: 12 MG/DL — SIGNIFICANT CHANGE UP (ref 7–23)
CALCIUM SERPL-MCNC: 8.9 MG/DL — SIGNIFICANT CHANGE UP (ref 8.4–10.5)
CHLORIDE SERPL-SCNC: 104 MMOL/L — SIGNIFICANT CHANGE UP (ref 96–108)
CO2 SERPL-SCNC: 27 MMOL/L — SIGNIFICANT CHANGE UP (ref 22–31)
CREAT SERPL-MCNC: 0.72 MG/DL — SIGNIFICANT CHANGE UP (ref 0.5–1.3)
EOSINOPHIL # BLD AUTO: 0.35 K/UL — SIGNIFICANT CHANGE UP (ref 0–0.5)
EOSINOPHIL NFR BLD AUTO: 2.9 % — SIGNIFICANT CHANGE UP (ref 0–6)
EXTRA SST TUBE: SIGNIFICANT CHANGE UP
GLUCOSE SERPL-MCNC: 130 MG/DL — HIGH (ref 70–99)
HCT VFR BLD CALC: 32.9 % — LOW (ref 34.5–45)
HGB BLD-MCNC: 10.5 G/DL — LOW (ref 11.5–15.5)
IMM GRANULOCYTES NFR BLD AUTO: 2.1 % — HIGH (ref 0–1.5)
INR BLD: 1.1 — SIGNIFICANT CHANGE UP (ref 0.88–1.16)
LDH SERPL L TO P-CCNC: 358 U/L — HIGH (ref 50–242)
LYMPHOCYTES # BLD AUTO: 17.2 % — SIGNIFICANT CHANGE UP (ref 13–44)
LYMPHOCYTES # BLD AUTO: 2.07 K/UL — SIGNIFICANT CHANGE UP (ref 1–3.3)
MAGNESIUM SERPL-MCNC: 1.6 MG/DL — SIGNIFICANT CHANGE UP (ref 1.6–2.6)
MCHC RBC-ENTMCNC: 29.4 PG — SIGNIFICANT CHANGE UP (ref 27–34)
MCHC RBC-ENTMCNC: 31.9 GM/DL — LOW (ref 32–36)
MCV RBC AUTO: 92.2 FL — SIGNIFICANT CHANGE UP (ref 80–100)
MONOCYTES # BLD AUTO: 1.1 K/UL — HIGH (ref 0–0.9)
MONOCYTES NFR BLD AUTO: 9.1 % — SIGNIFICANT CHANGE UP (ref 2–14)
NEUTROPHILS # BLD AUTO: 8.22 K/UL — HIGH (ref 1.8–7.4)
NEUTROPHILS NFR BLD AUTO: 68.3 % — SIGNIFICANT CHANGE UP (ref 43–77)
NRBC # BLD: 0 /100 WBCS — SIGNIFICANT CHANGE UP (ref 0–0)
PLATELET # BLD AUTO: 319 K/UL — SIGNIFICANT CHANGE UP (ref 150–400)
POTASSIUM SERPL-MCNC: 4.1 MMOL/L — SIGNIFICANT CHANGE UP (ref 3.5–5.3)
POTASSIUM SERPL-SCNC: 4.1 MMOL/L — SIGNIFICANT CHANGE UP (ref 3.5–5.3)
PROT SERPL-MCNC: 6.6 G/DL — SIGNIFICANT CHANGE UP (ref 6–8.3)
PROTHROM AB SERPL-ACNC: 12.5 SEC — SIGNIFICANT CHANGE UP (ref 10–12.9)
RBC # BLD: 3.57 M/UL — LOW (ref 3.8–5.2)
RBC # FLD: 14.6 % — HIGH (ref 10.3–14.5)
SODIUM SERPL-SCNC: 142 MMOL/L — SIGNIFICANT CHANGE UP (ref 135–145)
SURGICAL PATHOLOGY STUDY: SIGNIFICANT CHANGE UP
URATE SERPL-MCNC: 5.2 MG/DL — SIGNIFICANT CHANGE UP (ref 2.5–7)
WBC # BLD: 12.04 K/UL — HIGH (ref 3.8–10.5)
WBC # FLD AUTO: 12.04 K/UL — HIGH (ref 3.8–10.5)

## 2019-02-27 PROCEDURE — 99231 SBSQ HOSP IP/OBS SF/LOW 25: CPT

## 2019-02-27 PROCEDURE — 99291 CRITICAL CARE FIRST HOUR: CPT

## 2019-02-27 PROCEDURE — 99253 IP/OBS CNSLTJ NEW/EST LOW 45: CPT | Mod: GC

## 2019-02-27 RX ORDER — MAGNESIUM SULFATE 500 MG/ML
2 VIAL (ML) INJECTION
Qty: 40 | Refills: 0 | Status: DISCONTINUED | OUTPATIENT
Start: 2019-02-27 | End: 2019-02-27

## 2019-02-27 RX ORDER — GLUCAGON INJECTION, SOLUTION 0.5 MG/.1ML
1 INJECTION, SOLUTION SUBCUTANEOUS ONCE
Qty: 0 | Refills: 0 | Status: DISCONTINUED | OUTPATIENT
Start: 2019-02-27 | End: 2019-02-28

## 2019-02-27 RX ORDER — DEXTROSE 50 % IN WATER 50 %
25 SYRINGE (ML) INTRAVENOUS ONCE
Qty: 0 | Refills: 0 | Status: DISCONTINUED | OUTPATIENT
Start: 2019-02-27 | End: 2019-02-28

## 2019-02-27 RX ORDER — MAGNESIUM SULFATE 500 MG/ML
4 VIAL (ML) INJECTION ONCE
Qty: 0 | Refills: 0 | Status: COMPLETED | OUTPATIENT
Start: 2019-02-27 | End: 2019-02-27

## 2019-02-27 RX ORDER — HEPARIN SODIUM 5000 [USP'U]/ML
7500 INJECTION INTRAVENOUS; SUBCUTANEOUS EVERY 8 HOURS
Qty: 0 | Refills: 0 | Status: DISCONTINUED | OUTPATIENT
Start: 2019-02-27 | End: 2019-03-02

## 2019-02-27 RX ORDER — LABETALOL HCL 100 MG
300 TABLET ORAL EVERY 8 HOURS
Qty: 0 | Refills: 0 | Status: DISCONTINUED | OUTPATIENT
Start: 2019-02-27 | End: 2019-03-01

## 2019-02-27 RX ORDER — DEXTROSE 50 % IN WATER 50 %
12.5 SYRINGE (ML) INTRAVENOUS ONCE
Qty: 0 | Refills: 0 | Status: DISCONTINUED | OUTPATIENT
Start: 2019-02-27 | End: 2019-02-28

## 2019-02-27 RX ORDER — LABETALOL HCL 100 MG
80 TABLET ORAL ONCE
Qty: 0 | Refills: 0 | Status: COMPLETED | OUTPATIENT
Start: 2019-02-27 | End: 2019-02-27

## 2019-02-27 RX ORDER — MAGNESIUM SULFATE 500 MG/ML
3 VIAL (ML) INJECTION
Qty: 40 | Refills: 0 | Status: DISCONTINUED | OUTPATIENT
Start: 2019-02-27 | End: 2019-02-28

## 2019-02-27 RX ORDER — LABETALOL HCL 100 MG
20 TABLET ORAL ONCE
Qty: 0 | Refills: 0 | Status: COMPLETED | OUTPATIENT
Start: 2019-02-27 | End: 2019-02-27

## 2019-02-27 RX ORDER — NIFEDIPINE 30 MG
30 TABLET, EXTENDED RELEASE 24 HR ORAL
Qty: 0 | Refills: 0 | Status: DISCONTINUED | OUTPATIENT
Start: 2019-02-27 | End: 2019-02-28

## 2019-02-27 RX ORDER — LEVOTHYROXINE SODIUM 125 MCG
125 TABLET ORAL DAILY
Qty: 0 | Refills: 0 | Status: DISCONTINUED | OUTPATIENT
Start: 2019-02-27 | End: 2019-03-01

## 2019-02-27 RX ORDER — INSULIN LISPRO 100/ML
VIAL (ML) SUBCUTANEOUS
Qty: 0 | Refills: 0 | Status: DISCONTINUED | OUTPATIENT
Start: 2019-02-27 | End: 2019-03-02

## 2019-02-27 RX ORDER — HYDRALAZINE HCL 50 MG
10 TABLET ORAL ONCE
Qty: 0 | Refills: 0 | Status: DISCONTINUED | OUTPATIENT
Start: 2019-02-27 | End: 2019-02-28

## 2019-02-27 RX ORDER — DEXTROSE 50 % IN WATER 50 %
25 SYRINGE (ML) INTRAVENOUS ONCE
Qty: 0 | Refills: 0 | Status: DISCONTINUED | OUTPATIENT
Start: 2019-02-27 | End: 2019-03-02

## 2019-02-27 RX ORDER — LABETALOL HCL 100 MG
40 TABLET ORAL ONCE
Qty: 0 | Refills: 0 | Status: COMPLETED | OUTPATIENT
Start: 2019-02-27 | End: 2019-02-27

## 2019-02-27 RX ORDER — SODIUM CHLORIDE 9 MG/ML
1000 INJECTION, SOLUTION INTRAVENOUS
Qty: 0 | Refills: 0 | Status: DISCONTINUED | OUTPATIENT
Start: 2019-02-27 | End: 2019-02-28

## 2019-02-27 RX ORDER — DEXTROSE 50 % IN WATER 50 %
15 SYRINGE (ML) INTRAVENOUS ONCE
Qty: 0 | Refills: 0 | Status: DISCONTINUED | OUTPATIENT
Start: 2019-02-27 | End: 2019-02-28

## 2019-02-27 RX ADMIN — Medication 50 GM/HR: at 18:40

## 2019-02-27 RX ADMIN — Medication 300 MILLIGRAM(S): at 20:45

## 2019-02-27 RX ADMIN — Medication 20 MILLIGRAM(S): at 17:50

## 2019-02-27 RX ADMIN — Medication 30 MILLIGRAM(S): at 21:18

## 2019-02-27 RX ADMIN — Medication 300 GRAM(S): at 18:06

## 2019-02-27 RX ADMIN — HEPARIN SODIUM 7500 UNIT(S): 5000 INJECTION INTRAVENOUS; SUBCUTANEOUS at 22:35

## 2019-02-27 RX ADMIN — Medication 4 GRAM(S): at 18:41

## 2019-02-27 RX ADMIN — Medication 40 MILLIGRAM(S): at 18:14

## 2019-02-27 RX ADMIN — Medication 80 MILLIGRAM(S): at 18:45

## 2019-02-27 NOTE — PROGRESS NOTE ADULT - SUBJECTIVE AND OBJECTIVE BOX
Patient brought up from ED, readmitted for severe PEC, BP on arrival 179/100> 179/94, pushed additional 80mg IV Labetalol @ 19:57, repeat BP 10 min later was noted to be 153/80    Patient continues to deny toxic symptoms, increased magnesium to 3g/hr as per last admission, will continue to monitor BP closely

## 2019-02-27 NOTE — PROGRESS NOTE ADULT - SUBJECTIVE AND OBJECTIVE BOX
/83> 166/93 on repeat, patient received PO labetalol as well as 10mg hydralazine @ 20:47, will hold home PO nifedipine at this time and repeat BP in 20 min

## 2019-02-27 NOTE — ED PROVIDER NOTE - OBJECTIVE STATEMENT
Pt w/ PMHx hypothyroidism, pre-DM and GDM (currently off all diabetics meds), pre-eclampsia, s/p STAT c/s on 2/22/19 for pre-eclampsia, discharged home yesterday on Labetalol 300 mg Q8 (last taken at 9 am - missed afternoon dose b/c was coming to the hospital) and Nifedipine XL 30 mg QD (taken at 11 am). Pt took her BP monitor today, SBP's 204, followed by 219. Pt then called Dr Salguero, and was advised to come to the ED. Denies CP, HA, blurred vision, abd pain. Pt reports chronic SOB throughout pregnancy, unchanged. Chronic orthopnea, unchanged. + stable LE edema. No facial or hand edema. Pt has drain in place. Pt had b/l LE doppler on 2/25, neg. Pt also had echo on 2/23 - normal EF

## 2019-02-27 NOTE — H&P ADULT - NSHPLABSRESULTS_GEN_ALL_CORE
10.5   12.04 )-----------( 319      ( 27 Feb 2019 18:03 )             32.9     02-27    142  |  104  |  12  ----------------------------<  130<H>  4.1   |  27  |  0.72    Ca    8.9      27 Feb 2019 18:03  Mg     1.6     02-27    TPro  6.6  /  Alb  3.1<L>  /  TBili  0.2  /  DBili  x   /  AST  29  /  ALT  37  /  AlkPhos  60  02-27

## 2019-02-27 NOTE — H&P ADULT - NSHPPHYSICALEXAM_GEN_ALL_CORE
O:  T(C): 36.7 (02-27-19 @ 18:47), Max: 36.7 (02-27-19 @ 18:47)  HR: 75 (02-27-19 @ 18:47) (73 - 84)  BP: 159/81 (02-27-19 @ 18:47) (135/100 - 216/125)  RR: 18 (02-27-19 @ 18:47) (18 - 20)  SpO2: 98% (02-27-19 @ 18:47) (95% - 99%)  Wt(kg): --  Daily Height in cm: 162.56 (27 Feb 2019 17:32)    Daily     02-27 @ 07:01  -  02-27 @ 19:51  --------------------------------------------------------  IN: 0 mL / OUT: 700 mL / NET: -700 mL      Gen: NAD, AAOx3  CV: RRR, no M/R/G  Pulm: CTAB, no R/R/W  Abd: soft, nontender, no epigastric tenderness, +BS  : Trisha   Ext: SCDs in place, Patelllar reflexes 2+

## 2019-02-27 NOTE — ED PROVIDER NOTE - PHYSICAL EXAMINATION
Constitutional: Well appearing, well nourished, awake, alert, oriented to person, place, time/situation and in no apparent distress.  ENMT: Airway patent. Normal MM  Eyes: Clear bilaterally  Cardiac: Normal rate, regular rhythm.  Heart sounds S1, S2.  No murmurs, rubs or gallops. No JVD +trace - 1+ pitting edema in b/l ankles  Respiratory: Breaths sounds equal and clear b/l. No increased WOB, tachypnea, hypoxia, or accessory mm use. Pt speaks in full sentences.   Gastrointestinal: Abd soft, NT, ND, NABS. No guarding, rebound, or rigidity. No pulsatile abdominal masses. No organomegaly appreciated. No CVAT   Musculoskeletal: Range of motion is not limited  Neuro: Alert and oriented x 3, face symmetric and speech fluent. Strength 5/5 x 4 ext and symmetric, nml gross motor movement, nml gait. No focal deficits noted.  Skin: Skin normal color for race, warm, dry and intact. No evidence of rash.  Psych: Alert and oriented to person, place, time/situation. normal mood and affect. no apparent risk to self or others.

## 2019-02-27 NOTE — H&P ADULT - HISTORY OF PRESENT ILLNESS
S: Pt evaluated at bedside for magnesium check. She denies visual disturbances, headache and right upper quadrant pain. Also denies nausea/vomiting/epigastric pain/shortness of breath/heart palpitations. Pain well controlled. 42 yo  s/p c section on 19 for pre-eclampsia with severe features presents for evaluation of severe range blood pressures. Patient reports that she was checking her blood pressure at home and had multiple blood pressures in the 200s. She called her doctor and was told to come to the ED for evaluation.  Patient denies visual disturbances, headache and right upper quadrant pain. Also denies nausea/vomiting/epigastric pain/shortness of breath/heart palpitations.  No other complaints.     ObHx:   G1 - CS for preeclampsia with severe features 19 - Spontaneous pregnancy,  course with Dr. Salguero;  -NIPT WNL, normal 1st and 2nd trimester screening  GynHx: denies  PMHx: DM type II, hypothyroidism.  PSHx: open Appendectomy  (not perforated).  Meds: PNV, Synthroid 300 mcg. Insulin (see above).  NKDA  Social: denies

## 2019-02-27 NOTE — PROGRESS NOTE ADULT - ASSESSMENT
43y Female POD5 s/p CS readmitted for severe preeclampsia based on BPs 210s/110s in ED     1. Preeclampsia with severe features:   - continue IV Mg 3g/hr x24 hours; s/p >24 hours of magnesium at time of delivery  - Magnesium clinical checks and serum levels q6 hours  - has received maximum dosages of Hydralazine and Labetalol per obstetric guidelines; attempting A-line for improved monitoring, SICU consulted.  Goal BP <160/110  - PO regimen increased to Labetalol 300 TID and Nifedipine 30mgXL  - Nephrology following during last admission, appreciate further recs  2. Endo: pregestational T2DM (pre-pregnancy on Metformin 1000mg); s/p endocrinology consult last admission - continue ISS  Hypothyroid - Continue pre-pregnancy synthroid, 175mcg qD  3.  Pulm: asymptomatic at this time, consider repeat CXR  4.  Cardiovascular: cardiomegaly and pulmonary vascular congestion on initial CXR  s/p Cardiology Consult, echo demonstrating hyperdynamic heart (EF 75%); consider repeat echo this admission  5. GI: NPO with sips for now  6. VTE: SCDs, SQH 7500 TID given BMI  7. Neuro: Oral pain medications as needed: hold NSAIDs  8. : voiding

## 2019-02-27 NOTE — H&P ADULT - ASSESSMENT
A&P:   43y Female s/p _____   complicated by preeclampsia with severe features  1. Preeclampsia: Continue IV Magnesium @2G/hr for 24 hrs post delivery. Magnesium clinical checks and serum assay q6 hrs. No complaints currently. BP well controlled on ____.  2. VTE: SCDs, SQH if indicated.   3. GI: Diet: clears as tolerated if not nauseous  4. Neuro: Oral pain medications as needed: hold NSAIDs  5. : strict Is and Os, D/C rubio after discontinuation of IV Magnesium  6. Discharge planning: discussed importance of BP check at home, will be evaluated by PP nursing re need for VNS. Discussed close follow up with OB within 1-2 wks. Reviewed toxic complaints with patient. A&P:   43y Female s/p primary CS 2/22/19 complicated by preeclampsia with severe features now presents with severe range blood pressures.   1. Preeclampsia: Start IV Magnesium @3G/hr for 24 hrs. Magnesium clinical checks and serum assay q6 hrs. No complaints currently. Patient received Iv labetalol 20, 40, 80 mg in the ED for blood pressure control.   2. GI: Diet: clears as tolerated if not nauseous  3. Neuro: Oral pain medications as needed: hold NSAIDs  4. : strict Is and Os,   5. Admit to OB service

## 2019-02-27 NOTE — CONSULT NOTE ADULT - ATTENDING COMMENTS
This patient was evaluated at bedside in the OB/GYN floor  with the house staff and management decisions were made, see above for the details, I agree with the A/P.  The patient is a 44 y/o female  s/p  19 who was stable and d/c home but presented to the ED after her BP at home was elevated.  At the time of evaluation her SBP was 137 mmHg and she had no other symptoms.  - s/p  o 19  -HTN  >BP control : labetalol, nifedipine and prn hydralazine  >RISS  >synthroid  >SCD

## 2019-02-27 NOTE — ED ADULT NURSE NOTE - OBJECTIVE STATEMENT
42 y/o female s/p   delivery on the 19  c/o hypertension today. Pt reports  having an emergency  because " her blood pressure was high" then was d/c home yesterday and was told to check her bp at home' today 44 y/o female s/p   delivery on the 19  c/o hypertension today. Pt reports  having an emergency  because " her blood pressure was high" then was d/c home yesterday and was told to check her bp at home' today her BP was in the 200's at home, " she called her ob-gyn and was told to come to ED" pt was found to be hypertensive on triage 231/102. upgraded to MD [place], placed on cardiac monitor, Iv line placed, and medications given as prescribed. family at bedside. Evaluated by OB-GYN.  awaiting admission to the floor. 44 y/o female s/p   delivery on the 19  c/o hypertension today. Pt reports  having an emergency  because " her blood pressure was high" then was d/c home yesterday and was told to check her bp at home' today her BP was in the 200's at home, " she called her ob-gyn and was told to come to ED" pt was found to be hypertensive on triage 231/102. upgraded to MD [place], placed on cardiac monitor, Iv line placed, and medications given as prescribed. family at bedside. pt denies dizziness, cp. bilateral  LE edema noted. Evaluated by OB-GYN.  awaiting admission to the floor.

## 2019-02-27 NOTE — ED PROVIDER NOTE - CLINICAL SUMMARY MEDICAL DECISION MAKING FREE TEXT BOX
Pt p/w severe HTN, asymptomatic, pre-eclamptic prior to delivery, concerning for pre-eclampsia, HTNsive urgency, less likely hypertensive emergency. STAT OB consult. IV labetalol, mag. Pre-eclampsia labs, UA, EKG. Anticipate admission

## 2019-02-27 NOTE — PROGRESS NOTE ADULT - SUBJECTIVE AND OBJECTIVE BOX
S: Pt evaluated at bedside for magnesium check.  Anesthesiology currently attempting A-line placement.  SICU consulted for evaluation due to concern for refractory hypertension and need for more intensive monitoring.  Maximum IV antihypertensives reached per obstetric guidelines    Pushed additional 80 of Labetalol at 2000 (received 20mg at 1750, 40mg at 1810, 80mg at 1845 in ED)  Pushed 10 of Hydralazine at 2045 and 2105 to reach maximum of 20mg     She denies visual disturbances, headache and right upper quadrant pain. Also denies nausea/vomiting/epigastric pain/shortness of breath. Pain well controlled, though uncomfortable with A-line placement. Tolerating magnesium well.    O:  T(C): 36.4 (19 @ 20:15), Max: 37 (19 @ 20:01)  HR: 74 (19 @ 04:06) (70 - 84)  BP: 168/79 (19 @ 04:06) (135/100 - 216/125)  RR: 10 (19 @ 04:06) (10 - 20)  SpO2: 93% (19 @ 04:06) (93% - 100%)  Wt(kg): --  Daily Height in cm: 162.56 (2019 20:03)    Daily Weight present in k (2019 20:03)     @ 07:01  -   @ 04:17  --------------------------------------------------------  IN: 200 mL / OUT: 2300 mL / NET: -2100 mL      Gen: NAD, AAOx3  CV: RRR  Pulm: CTAB, no R/R/W  Abd: soft, nontender, no rebound or guarding  Ext: +3 edema virgil, Reflexes +1                          9.9    12.12 )-----------( 307      ( 2019 00:48 )             30.8         137  |  101  |  10  ----------------------------<  145<H>  3.8   |  25  |  0.59    Ca    8.2<L>      2019 00:48  Mg     4.2         TPro  6.2  /  Alb  3.2<L>  /  TBili  0.2  /  DBili  x   /  AST  26  /  ALT  37  /  AlkPhos  57

## 2019-02-27 NOTE — CONSULT NOTE ADULT - CONSULT REASON
42 y/o F with PMHx of Hypothyroid, Obesity, DM and recent admission on  for hypertensive emergency and preeclampsia resulting in emergent . Pt was discharged yesterday and returned to ED today with c/o SBP > 200's at home and in ED. Pt given total of 140 IV labetalol and 300po x1( scheduled q8), Nifedipine XL 30x1( scheduled bid) and 20 of hydralazine IV x1.

## 2019-02-27 NOTE — CONSULT NOTE ADULT - SUBJECTIVE AND OBJECTIVE BOX
HPI:  42 yo  s/p c section on 19 for pre-eclampsia with severe features presents to ED with home SBP> 200's.  She called her doctor and was told to come to the ED for evaluation.  Patient denies visual disturbances, headache. Also denies nausea/vomiting/epigastric pain/shortness of breath/heart palpitations.  No other complaints.     ObHx:   G1 - CS for preeclampsia with severe features 19 - Spontaneous pregnancy,  course with Dr. Salguero;  -NIPT WNL, normal 1st and 2nd trimester screening  GynHx: denies  PMHx: DM type II, hypothyroidism.  PSHx: open Appendectomy  (not perforated).  Meds: PNV, Synthroid 300 mcg. Insulin (see above).  NKDA  Social: denies (2019 19:49)      PAST MEDICAL & SURGICAL HISTORY:  Hypothyroid  DM (diabetes mellitus) in pregnancy  S/P       ROS: See HPI    MEDICATIONS  (STANDING):  dextrose 5%. 1000 milliLiter(s) (50 mL/Hr) IV Continuous <Continuous>  dextrose 50% Injectable 12.5 Gram(s) IV Push once  dextrose 50% Injectable 25 Gram(s) IV Push once  dextrose 50% Injectable 25 Gram(s) IV Push once  heparin  Injectable 7500 Unit(s) SubCutaneous every 8 hours  hydrALAZINE Injectable 10 milliGRAM(s) IV Push once  insulin lispro (HumaLOG) corrective regimen sliding scale   SubCutaneous Before meals and at bedtime  labetalol 300 milliGRAM(s) Oral every 8 hours  lactated ringers. 1000 milliLiter(s) (75 mL/Hr) IV Continuous <Continuous>  levothyroxine 125 MICROGram(s) Oral daily  magnesium sulfate Infusion 3 Gm/Hr (75 mL/Hr) IV Continuous <Continuous>  NIFEdipine XL 30 milliGRAM(s) Oral <User Schedule>    MEDICATIONS  (PRN):  dextrose 40% Gel 15 Gram(s) Oral once PRN Blood Glucose LESS THAN 70 milliGRAM(s)/deciliter  glucagon  Injectable 1 milliGRAM(s) IntraMuscular once PRN Glucose LESS THAN 70 milligrams/deciliter      Allergies    No Known Allergies    Intolerances        SOCIAL HISTORY:  Smoke: Never Smoker  EtOH: occasional    FAMILY HISTORY:  No pertinent family history in first degree relatives      Vital Signs Last 24 Hrs  T(C): 36.4 (2019 20:15), Max: 37 (2019 20:01)  T(F): 97.5 (2019 20:15), Max: 98.6 (2019 20:01)  HR: 73 (2019 22:00) (70 - 84)  BP: 152/78 (2019 22:00) (135/100 - 216/125)  BP(mean): --  RR: 18 (2019 22:00) (17 - 20)  SpO2: 100% (2019 22:00) (95% - 100%)    PHYSICAL EXAM    Gen: NAD sitting at edge of bed talking with family   Neuro: A&oX3 no neuro deficits   CV: RRR reg s1s2 no M  Pulm: CTA B/L no w/w/r  Abd: Soft, Obese post partum abd with large pannus +Pfannenstiel incision with wound vac in place +Tenderness @incision site.  Ext: No C/C/E +2+ pitting edema in LE b/l   Skin: No rashes erythema or ecchymosis  MSK: No joint swelling noted  Psych: Normal affect     LABS:                        10.5   12.04 )-----------( 319      ( 2019 18:03 )             32.9     -    142  |  104  |  12  ----------------------------<  130<H>  4.1   |  27  |  0.72    Ca    8.9      2019 18:03  Mg     1.6         TPro  6.6  /  Alb  3.1<L>  /  TBili  0.2  /  DBili  x   /  AST  29  /  ALT  37  /  AlkPhos  60  -    PT/INR - ( 2019 18:03 )   PT: 12.5 sec;   INR: 1.10          PTT - ( 2019 18:03 )  PTT:29.1 sec      RADIOLOGY & ADDITIONAL STUDIES:    Assessment and Plan:  42yo Female s/p emergent  on  for preeclampsia with severe HTN. Now returns to [facility] with SBP > 200.   Dispo: No need for ICU admission at this time, however cont close monitoring and please re-consult ICU team as needed.   Neuro: Currently denies HA or visual changes Cont to watch for neuro changes given recent preeclampsia and HTN  CV: Severe HTN post partum- Now improved to 130's systolic. Cont current regimen of labetalol 300 tid and Nifedipine 30 xl q12. Can increase nifedipine to 60 if BP increased and cont hydralazine/ labetalol PRN. Total body fluid overloaded- on IVF LR@75- can reduce or d/c given fluid overload but will differ to OBGYN team at this time   Pulm: Saturating well on NC, Atelectasis on recent imaging Cont IS and encourage ambulation.   GI: DM Diet if cleared by OBGYN  : Voiding well no need for strict I/O   GYN: Cont mg drip as per OBGYN protocol.   ID: No need for abx at this time  Endo: DM hx: cont ISS and Synthroid 125 po qd  PPX: cont SQH @7500 Q8   Lines: PIV   Wound: Wound vac in place. Management as per OBGYN team

## 2019-02-28 LAB
ALBUMIN SERPL ELPH-MCNC: 3.1 G/DL — LOW (ref 3.3–5)
ALBUMIN SERPL ELPH-MCNC: 3.2 G/DL — LOW (ref 3.3–5)
ALBUMIN SERPL ELPH-MCNC: 3.2 G/DL — LOW (ref 3.3–5)
ALP SERPL-CCNC: 52 U/L — SIGNIFICANT CHANGE UP (ref 40–120)
ALP SERPL-CCNC: 57 U/L — SIGNIFICANT CHANGE UP (ref 40–120)
ALP SERPL-CCNC: 62 U/L — SIGNIFICANT CHANGE UP (ref 40–120)
ALT FLD-CCNC: 35 U/L — SIGNIFICANT CHANGE UP (ref 10–45)
ALT FLD-CCNC: 37 U/L — SIGNIFICANT CHANGE UP (ref 10–45)
ALT FLD-CCNC: 39 U/L — SIGNIFICANT CHANGE UP (ref 10–45)
ANION GAP SERPL CALC-SCNC: 11 MMOL/L — SIGNIFICANT CHANGE UP (ref 5–17)
ANION GAP SERPL CALC-SCNC: 11 MMOL/L — SIGNIFICANT CHANGE UP (ref 5–17)
ANION GAP SERPL CALC-SCNC: 12 MMOL/L — SIGNIFICANT CHANGE UP (ref 5–17)
AST SERPL-CCNC: 24 U/L — SIGNIFICANT CHANGE UP (ref 10–40)
AST SERPL-CCNC: 26 U/L — SIGNIFICANT CHANGE UP (ref 10–40)
AST SERPL-CCNC: 28 U/L — SIGNIFICANT CHANGE UP (ref 10–40)
BILIRUB SERPL-MCNC: 0.2 MG/DL — SIGNIFICANT CHANGE UP (ref 0.2–1.2)
BUN SERPL-MCNC: 10 MG/DL — SIGNIFICANT CHANGE UP (ref 7–23)
BUN SERPL-MCNC: 8 MG/DL — SIGNIFICANT CHANGE UP (ref 7–23)
BUN SERPL-MCNC: 8 MG/DL — SIGNIFICANT CHANGE UP (ref 7–23)
CALCIUM SERPL-MCNC: 7.4 MG/DL — LOW (ref 8.4–10.5)
CALCIUM SERPL-MCNC: 7.5 MG/DL — LOW (ref 8.4–10.5)
CALCIUM SERPL-MCNC: 8.2 MG/DL — LOW (ref 8.4–10.5)
CHLORIDE SERPL-SCNC: 100 MMOL/L — SIGNIFICANT CHANGE UP (ref 96–108)
CHLORIDE SERPL-SCNC: 100 MMOL/L — SIGNIFICANT CHANGE UP (ref 96–108)
CHLORIDE SERPL-SCNC: 101 MMOL/L — SIGNIFICANT CHANGE UP (ref 96–108)
CO2 SERPL-SCNC: 25 MMOL/L — SIGNIFICANT CHANGE UP (ref 22–31)
CO2 SERPL-SCNC: 26 MMOL/L — SIGNIFICANT CHANGE UP (ref 22–31)
CO2 SERPL-SCNC: 28 MMOL/L — SIGNIFICANT CHANGE UP (ref 22–31)
CREAT SERPL-MCNC: 0.59 MG/DL — SIGNIFICANT CHANGE UP (ref 0.5–1.3)
CREAT SERPL-MCNC: 0.62 MG/DL — SIGNIFICANT CHANGE UP (ref 0.5–1.3)
CREAT SERPL-MCNC: 0.63 MG/DL — SIGNIFICANT CHANGE UP (ref 0.5–1.3)
GLUCOSE BLDC GLUCOMTR-MCNC: 127 MG/DL — HIGH (ref 70–99)
GLUCOSE BLDC GLUCOMTR-MCNC: 140 MG/DL — HIGH (ref 70–99)
GLUCOSE BLDC GLUCOMTR-MCNC: 155 MG/DL — HIGH (ref 70–99)
GLUCOSE BLDC GLUCOMTR-MCNC: 184 MG/DL — HIGH (ref 70–99)
GLUCOSE SERPL-MCNC: 145 MG/DL — HIGH (ref 70–99)
GLUCOSE SERPL-MCNC: 152 MG/DL — HIGH (ref 70–99)
GLUCOSE SERPL-MCNC: 171 MG/DL — HIGH (ref 70–99)
HCT VFR BLD CALC: 30.6 % — LOW (ref 34.5–45)
HCT VFR BLD CALC: 30.8 % — LOW (ref 34.5–45)
HCT VFR BLD CALC: 32.8 % — LOW (ref 34.5–45)
HGB BLD-MCNC: 10.5 G/DL — LOW (ref 11.5–15.5)
HGB BLD-MCNC: 9.5 G/DL — LOW (ref 11.5–15.5)
HGB BLD-MCNC: 9.9 G/DL — LOW (ref 11.5–15.5)
MAGNESIUM SERPL-MCNC: 4.2 MG/DL — HIGH (ref 1.6–2.6)
MAGNESIUM SERPL-MCNC: 5.3 MG/DL — HIGH (ref 1.6–2.6)
MAGNESIUM SERPL-MCNC: 6.5 MG/DL — HIGH (ref 1.6–2.6)
MCHC RBC-ENTMCNC: 28.5 PG — SIGNIFICANT CHANGE UP (ref 27–34)
MCHC RBC-ENTMCNC: 29.4 PG — SIGNIFICANT CHANGE UP (ref 27–34)
MCHC RBC-ENTMCNC: 29.4 PG — SIGNIFICANT CHANGE UP (ref 27–34)
MCHC RBC-ENTMCNC: 31 GM/DL — LOW (ref 32–36)
MCHC RBC-ENTMCNC: 32 GM/DL — SIGNIFICANT CHANGE UP (ref 32–36)
MCHC RBC-ENTMCNC: 32.1 GM/DL — SIGNIFICANT CHANGE UP (ref 32–36)
MCV RBC AUTO: 91.4 FL — SIGNIFICANT CHANGE UP (ref 80–100)
MCV RBC AUTO: 91.9 FL — SIGNIFICANT CHANGE UP (ref 80–100)
MCV RBC AUTO: 91.9 FL — SIGNIFICANT CHANGE UP (ref 80–100)
NRBC # BLD: 0 /100 WBCS — SIGNIFICANT CHANGE UP (ref 0–0)
PHOSPHATE SERPL-MCNC: 4.1 MG/DL — SIGNIFICANT CHANGE UP (ref 2.5–4.5)
PHOSPHATE SERPL-MCNC: 4.3 MG/DL — SIGNIFICANT CHANGE UP (ref 2.5–4.5)
PLATELET # BLD AUTO: 286 K/UL — SIGNIFICANT CHANGE UP (ref 150–400)
PLATELET # BLD AUTO: 307 K/UL — SIGNIFICANT CHANGE UP (ref 150–400)
PLATELET # BLD AUTO: 329 K/UL — SIGNIFICANT CHANGE UP (ref 150–400)
POTASSIUM SERPL-MCNC: 3.6 MMOL/L — SIGNIFICANT CHANGE UP (ref 3.5–5.3)
POTASSIUM SERPL-MCNC: 3.8 MMOL/L — SIGNIFICANT CHANGE UP (ref 3.5–5.3)
POTASSIUM SERPL-MCNC: 3.8 MMOL/L — SIGNIFICANT CHANGE UP (ref 3.5–5.3)
POTASSIUM SERPL-SCNC: 3.6 MMOL/L — SIGNIFICANT CHANGE UP (ref 3.5–5.3)
POTASSIUM SERPL-SCNC: 3.8 MMOL/L — SIGNIFICANT CHANGE UP (ref 3.5–5.3)
POTASSIUM SERPL-SCNC: 3.8 MMOL/L — SIGNIFICANT CHANGE UP (ref 3.5–5.3)
PROT SERPL-MCNC: 5.9 G/DL — LOW (ref 6–8.3)
PROT SERPL-MCNC: 6.2 G/DL — SIGNIFICANT CHANGE UP (ref 6–8.3)
PROT SERPL-MCNC: 6.5 G/DL — SIGNIFICANT CHANGE UP (ref 6–8.3)
RBC # BLD: 3.33 M/UL — LOW (ref 3.8–5.2)
RBC # BLD: 3.37 M/UL — LOW (ref 3.8–5.2)
RBC # BLD: 3.57 M/UL — LOW (ref 3.8–5.2)
RBC # FLD: 14.6 % — HIGH (ref 10.3–14.5)
RBC # FLD: 14.6 % — HIGH (ref 10.3–14.5)
RBC # FLD: 14.7 % — HIGH (ref 10.3–14.5)
SODIUM SERPL-SCNC: 137 MMOL/L — SIGNIFICANT CHANGE UP (ref 135–145)
SODIUM SERPL-SCNC: 137 MMOL/L — SIGNIFICANT CHANGE UP (ref 135–145)
SODIUM SERPL-SCNC: 140 MMOL/L — SIGNIFICANT CHANGE UP (ref 135–145)
WBC # BLD: 10.9 K/UL — HIGH (ref 3.8–10.5)
WBC # BLD: 11.52 K/UL — HIGH (ref 3.8–10.5)
WBC # BLD: 12.12 K/UL — HIGH (ref 3.8–10.5)
WBC # FLD AUTO: 10.9 K/UL — HIGH (ref 3.8–10.5)
WBC # FLD AUTO: 11.52 K/UL — HIGH (ref 3.8–10.5)
WBC # FLD AUTO: 12.12 K/UL — HIGH (ref 3.8–10.5)

## 2019-02-28 PROCEDURE — 99231 SBSQ HOSP IP/OBS SF/LOW 25: CPT

## 2019-02-28 RX ORDER — NIFEDIPINE 30 MG
60 TABLET, EXTENDED RELEASE 24 HR ORAL
Qty: 0 | Refills: 0 | Status: DISCONTINUED | OUTPATIENT
Start: 2019-02-28 | End: 2019-02-28

## 2019-02-28 RX ORDER — POTASSIUM CHLORIDE 20 MEQ
40 PACKET (EA) ORAL ONCE
Qty: 0 | Refills: 0 | Status: COMPLETED | OUTPATIENT
Start: 2019-02-28 | End: 2019-02-28

## 2019-02-28 RX ORDER — NIFEDIPINE 30 MG
30 TABLET, EXTENDED RELEASE 24 HR ORAL
Qty: 0 | Refills: 0 | Status: DISCONTINUED | OUTPATIENT
Start: 2019-02-28 | End: 2019-03-02

## 2019-02-28 RX ORDER — HYDRALAZINE HCL 50 MG
10 TABLET ORAL EVERY 6 HOURS
Qty: 0 | Refills: 0 | Status: DISCONTINUED | OUTPATIENT
Start: 2019-02-28 | End: 2019-03-02

## 2019-02-28 RX ORDER — NIFEDIPINE 30 MG
30 TABLET, EXTENDED RELEASE 24 HR ORAL
Qty: 0 | Refills: 0 | Status: DISCONTINUED | OUTPATIENT
Start: 2019-02-28 | End: 2019-02-28

## 2019-02-28 RX ORDER — ACETAMINOPHEN 500 MG
1000 TABLET ORAL ONCE
Qty: 0 | Refills: 0 | Status: COMPLETED | OUTPATIENT
Start: 2019-02-28 | End: 2019-02-28

## 2019-02-28 RX ORDER — LABETALOL HCL 100 MG
80 TABLET ORAL ONCE
Qty: 0 | Refills: 0 | Status: DISCONTINUED | OUTPATIENT
Start: 2019-02-28 | End: 2019-02-28

## 2019-02-28 RX ORDER — HYDRALAZINE HCL 50 MG
20 TABLET ORAL ONCE
Qty: 0 | Refills: 0 | Status: DISCONTINUED | OUTPATIENT
Start: 2019-02-28 | End: 2019-02-28

## 2019-02-28 RX ORDER — MAGNESIUM SULFATE 500 MG/ML
2 VIAL (ML) INJECTION
Qty: 40 | Refills: 0 | Status: DISCONTINUED | OUTPATIENT
Start: 2019-02-28 | End: 2019-02-28

## 2019-02-28 RX ORDER — ACETAMINOPHEN 500 MG
650 TABLET ORAL EVERY 6 HOURS
Qty: 0 | Refills: 0 | Status: DISCONTINUED | OUTPATIENT
Start: 2019-02-28 | End: 2019-03-02

## 2019-02-28 RX ORDER — FUROSEMIDE 40 MG
40 TABLET ORAL ONCE
Qty: 0 | Refills: 0 | Status: COMPLETED | OUTPATIENT
Start: 2019-02-28 | End: 2019-02-28

## 2019-02-28 RX ORDER — HYDRALAZINE HCL 50 MG
10 TABLET ORAL ONCE
Qty: 0 | Refills: 0 | Status: DISCONTINUED | OUTPATIENT
Start: 2019-02-28 | End: 2019-02-28

## 2019-02-28 RX ORDER — SODIUM CHLORIDE 9 MG/ML
1000 INJECTION, SOLUTION INTRAVENOUS
Qty: 0 | Refills: 0 | Status: DISCONTINUED | OUTPATIENT
Start: 2019-02-28 | End: 2019-02-28

## 2019-02-28 RX ORDER — NIFEDIPINE 30 MG
30 TABLET, EXTENDED RELEASE 24 HR ORAL ONCE
Qty: 0 | Refills: 0 | Status: COMPLETED | OUTPATIENT
Start: 2019-02-28 | End: 2019-02-28

## 2019-02-28 RX ADMIN — HEPARIN SODIUM 7500 UNIT(S): 5000 INJECTION INTRAVENOUS; SUBCUTANEOUS at 22:33

## 2019-02-28 RX ADMIN — Medication 2: at 20:36

## 2019-02-28 RX ADMIN — Medication 125 MICROGRAM(S): at 07:35

## 2019-02-28 RX ADMIN — Medication 30 MILLIGRAM(S): at 23:55

## 2019-02-28 RX ADMIN — Medication 400 MILLIGRAM(S): at 02:30

## 2019-02-28 RX ADMIN — Medication 75 GM/HR: at 10:01

## 2019-02-28 RX ADMIN — HEPARIN SODIUM 7500 UNIT(S): 5000 INJECTION INTRAVENOUS; SUBCUTANEOUS at 07:35

## 2019-02-28 RX ADMIN — Medication 10 MILLIGRAM(S): at 04:37

## 2019-02-28 RX ADMIN — HEPARIN SODIUM 7500 UNIT(S): 5000 INJECTION INTRAVENOUS; SUBCUTANEOUS at 15:23

## 2019-02-28 RX ADMIN — Medication 300 MILLIGRAM(S): at 03:56

## 2019-02-28 RX ADMIN — Medication 300 MILLIGRAM(S): at 20:10

## 2019-02-28 RX ADMIN — SODIUM CHLORIDE 50 MILLILITER(S): 9 INJECTION, SOLUTION INTRAVENOUS at 15:24

## 2019-02-28 RX ADMIN — Medication 40 MILLIGRAM(S): at 10:01

## 2019-02-28 RX ADMIN — Medication 1000 MILLIGRAM(S): at 04:04

## 2019-02-28 RX ADMIN — Medication 40 MILLIEQUIVALENT(S): at 10:01

## 2019-02-28 RX ADMIN — Medication 2: at 07:49

## 2019-02-28 RX ADMIN — Medication 60 MILLIGRAM(S): at 15:09

## 2019-02-28 RX ADMIN — Medication 300 MILLIGRAM(S): at 12:49

## 2019-02-28 NOTE — PROGRESS NOTE ADULT - SUBJECTIVE AND OBJECTIVE BOX
Admitted to SICU for HTN. Patient's HTN improved overnight and was <160 off of IV medication. Denies n/v/headaches/dizziness. Tolerating diet. Patient not complaining of pain. Plan for SDU today.     ICU Vital Signs Last 24 Hrs  T(C): 37.1 (28 Feb 2019 07:01), Max: 37.1 (28 Feb 2019 07:01)  T(F): 98.8 (28 Feb 2019 07:01), Max: 98.8 (28 Feb 2019 07:01)  HR: 74 (28 Feb 2019 08:00) (68 - 84)  BP: 153/69 (28 Feb 2019 08:00) (135/69 - 216/125)  BP(mean): 96 (28 Feb 2019 08:00) (91 - 110)  ABP: --  ABP(mean): --  RR: 24 (28 Feb 2019 08:00) (10 - 30)  SpO2: 96% (28 Feb 2019 08:00) (92% - 100%)      Physical Exam:  Gen: NAD, AOx3  HEENT: MMM, EOMI, NCAT  CV: RRR  Pulm: CTA b/l, unlabored breathing  Abd: s, obese, appropriately ttp, vac functioning  Ext: +edema to thigh, no c/c, WWP      I&O's Summary    27 Feb 2019 07:01  -  28 Feb 2019 07:00  --------------------------------------------------------  IN: 350 mL / OUT: 2950 mL / NET: -2600 mL    28 Feb 2019 07:01  -  28 Feb 2019 09:47  --------------------------------------------------------  IN: 225 mL / OUT: 700 mL / NET: -475 mL        LABS:                        9.5    11.52 )-----------( 286      ( 28 Feb 2019 07:42 )             30.6     02-28    137  |  100  |  8   ----------------------------<  171<H>  3.6   |  26  |  0.62    Ca    7.5<L>      28 Feb 2019 07:42  Phos  4.1     02-28  Mg     5.3     02-28    TPro  5.9<L>  /  Alb  3.2<L>  /  TBili  0.2  /  DBili  x   /  AST  24  /  ALT  35  /  AlkPhos  52  02-28    PT/INR - ( 27 Feb 2019 18:03 )   PT: 12.5 sec;   INR: 1.10          PTT - ( 27 Feb 2019 18:03 )  PTT:29.1 sec    CAPILLARY BLOOD GLUCOSE      POCT Blood Glucose.: 155 mg/dL (28 Feb 2019 07:41)    LIVER FUNCTIONS - ( 28 Feb 2019 07:42 )  Alb: 3.2 g/dL / Pro: 5.9 g/dL / ALK PHOS: 52 U/L / ALT: 35 U/L / AST: 24 U/L / GGT: x             Cultures:    Drips:    RADIOLOGY & ADDITIONAL STUDIES:

## 2019-02-28 NOTE — PROGRESS NOTE ADULT - SUBJECTIVE AND OBJECTIVE BOX
Patient evaluated at bedside.   Ms. Nunez was readmitted tonight after she called reporting a systolic BP of 215.  I talked her through repeating her BP over the telephone.  The reading was 220/111 so I advised her to come into the ED.  Upon arrival in the ED severe range BP's were confirmed.   She reports pain is well controlled with Tylenol.  Ms. Nunez denies headache, visual disturbances epigastric pain. dizziness, chest pain, palpitations, shortness of breath, nausea, vomiting or heavy vaginal bleeding.    She did report a nose bleed that woke her from sleep earlier last afternoon.    She has been ambulating without assistance, voiding spontaneously, passing gas, tolerating regular diet and is breastfeeding.    Pt is s/p ICU consult.  Note is appreciated    PHYSICAL EXAM:    GA: NAD, A+0 x 3.  There have been multiple severe BP's.  Currently it is 158/88  CV: RRR.  (+) murmur audible.   Pulm: CTA BL  Breasts: soft, nontender, no palpable masses  Abd: obese,  ( + ) BS, soft, nontender, nondistended, no rebound or guarding,   Incision: clean, dry and intact Provena dressing  in place  Uterus: Fundus midline; firm at 4 fb below umbilicus.  Fundus is difficult to palpate due to body habitus.   : lochia WNL  Extremities: 3+ BL edema.  No calf tenderness BL, reflexes +2 bilaterally                            10.5   12.04 )-----------( 319      ( 2019 18:03 )             32.9         142  |  104  |  12  ----------------------------<  130<H>  4.1   |  27  |  0.72    Ca    8.9      2019 18:03  Mg     1.6         TPro  6.6  /  Alb  3.1<L>  /  TBili  0.2  /  DBili  x   /  AST  29  /  ALT  37  /  AlkPhos  60      Magnesium, Serum: 1.6 mg/dL ( @ 18:03)    PT/INR - ( 2019 18:03 )   PT: 12.5 sec;   INR: 1.10          PTT - ( 2019 18:03 )  PTT:29.1 sec    Assessment:    HD 1 of readmission for severe PEC in hypertensive crisis  POD # 5 s/p  delivery    Plan:    Mg level to be drawn.  Attempts have been made by the nursing staff but they have been unable to get blood duet to significant periferal edema.  Anesthesia has been called to assess Ms. Nunez for an A-line.  Diet: NPO for now  Pain meds: Tylenol PRN.   Activity: Modified bed rest  Cardiac consult and echo later this AM  Continue intensive BP monitoring.

## 2019-02-28 NOTE — PROGRESS NOTE ADULT - SUBJECTIVE AND OBJECTIVE BOX
Patient evaluated at bedside. She is in the ICU for BP control. Currently, her BPs are in the mild to severe range. She has no toxic complaints. She is otherwise doing well postpartum/postop. Prevena dressing falling off therefore completely removed and pressure dressing placed.     Physical Exam:  Vital Signs Last 24 Hrs  T(C): 37.1 (28 Feb 2019 07:01), Max: 37.1 (28 Feb 2019 07:01)  T(F): 98.8 (28 Feb 2019 07:01), Max: 98.8 (28 Feb 2019 07:01)  HR: 72 (28 Feb 2019 07:01) (68 - 84)  BP: 157/72 (28 Feb 2019 07:01) (135/69 - 216/125)  BP(mean): 109 (28 Feb 2019 07:01) (91 - 110)  RR: 12 (28 Feb 2019 07:01) (10 - 30)  SpO2: 94% (28 Feb 2019 07:01) (92% - 100%)    GA: NAD, A+0 x 3  Abd: soft, nontender, edema/obese, no rebound or guarding, incision clean, dry and intact, uterus firm at midline (difficult to assess due to habitus)  : lochia WNL  Extremities: ++edema, no calf tenderness                            9.5    11.52 )-----------( 286      ( 28 Feb 2019 07:42 )             30.6     02-28    137  |  100  |  8   ----------------------------<  171<H>  3.6   |  26  |  0.62    Ca    7.5<L>      28 Feb 2019 07:42  Phos  4.1     02-28  Mg     5.3     02-28    TPro  5.9<L>  /  Alb  3.2<L>  /  TBili  0.2  /  DBili  x   /  AST  24  /  ALT  35  /  AlkPhos  52  02-28    Magnesium, Serum: 5.3 mg/dL (02-28 @ 07:42)  Magnesium, Serum: 4.2 mg/dL (02-28 @ 00:48)    PT/INR - ( 27 Feb 2019 18:03 )   PT: 12.5 sec;   INR: 1.10          PTT - ( 27 Feb 2019 18:03 )  PTT:29.1 sec    MEDICATIONS  (STANDING):  dextrose 5%. 1000 milliLiter(s) (50 mL/Hr) IV Continuous <Continuous>  dextrose 50% Injectable 12.5 Gram(s) IV Push once  dextrose 50% Injectable 25 Gram(s) IV Push once  dextrose 50% Injectable 25 Gram(s) IV Push once  heparin  Injectable 7500 Unit(s) SubCutaneous every 8 hours  insulin lispro (HumaLOG) corrective regimen sliding scale   SubCutaneous Before meals and at bedtime  labetalol 300 milliGRAM(s) Oral every 8 hours  levothyroxine 125 MICROGram(s) Oral daily  magnesium sulfate Infusion 3 Gm/Hr (75 mL/Hr) IV Continuous <Continuous>  NIFEdipine XL 60 milliGRAM(s) Oral <User Schedule>    MEDICATIONS  (PRN):  acetaminophen   Tablet .. 650 milliGRAM(s) Oral every 6 hours PRN Moderate Pain (4 - 6)  dextrose 40% Gel 15 Gram(s) Oral once PRN Blood Glucose LESS THAN 70 milliGRAM(s)/deciliter  glucagon  Injectable 1 milliGRAM(s) IntraMuscular once PRN Glucose LESS THAN 70 milligrams/deciliter  hydrALAZINE Injectable 10 milliGRAM(s) IV Push every 6 hours PRN SBP> 160

## 2019-02-28 NOTE — PROGRESS NOTE ADULT - ASSESSMENT
42yo Female s/p emergent  on  for preeclampsia with severe HTN. Now returns to [facility] with SBP > 200.     Neuro: Tylenol prn   CV: Severe HTN post partum-  labetalol 300 tid and Nifedipine 60 xl q12, Total body fluid overloaded IVF held.  Goal BP < 160   Pulm: Saturating well on NC, Atelectasis on recent imaging Cont IS and encourage ambulation.   GI: DM CLD   : Voiding well no need for strict I/O   GYN: Cont mg drip as per OBGYN protocol  ID: No need for abx at this time  Endo: DM hx: cont ISS and Synthroid 125 po qd  PPX: cont SQH @7500 Q8   Lines: PIV   Wound: provena Wound vac in place. Management as per OBGYN team   Dispo: SDU today

## 2019-02-28 NOTE — PROGRESS NOTE ADULT - SUBJECTIVE AND OBJECTIVE BOX
SICU PA note:   Pt seen at bedside after report of SBP increasing to 160's. As per OBGYN protocol [place] was attempted for blood draws and closer bp monitoring but pt refused after Anesthesiologist attempts and BP continued to increase to 160's. No antihypertensives were given to patient to correct increased BP as OBGYN had reached the maximum of their protocol. Upon examination pt complaining of abdominal pain, and continues to deny vision changes, HA, n/v or dizzyness at this time.   Plan:   As per OBGYN request pt will be transferred to SICU for closer BP monitoring  Hydralazine 20 given and IV tylenol for pain.   Cont mg gtt as per OBGYN protocol and d/c ivf due to fluid overload and UO > 125-100/hr  Dr Mckenna made aware of plan SICU PA note:   Pt seen at bedside after report of SBP increasing to 160's. As per OBGYN protocol [place] was attempted for blood draws and closer bp monitoring but pt refused after Anesthesiologist attempts and BP continued to increase to 160's. No antihypertensives were given to patient to correct increased BP as OBGYN had reached the maximum of their protocol. Upon examination pt complaining of abdominal pain, and continues to deny vision changes, HA, n/v or dizzyness at this time.   Plan:   As per OBGYN request pt will be transferred to SICU for closer BP monitoring  Hydralazine 20 given and IV tylenol for pain.   Cont mg gtt as per OBGYN protocol and d/c ivf due to fluid overload and UO > 125-100/hr  Dr Mckenna made aware of plan  The BP was still elevated and the patient had abdominal pain, she was given Tylenol and transferred to SICU for further management.

## 2019-02-28 NOTE — PROGRESS NOTE ADULT - SUBJECTIVE AND OBJECTIVE BOX
Pt seen at bedside with Attending Dr. Feng Houser. Pt doing well and denying any toxic complaints. She denies visual disturbances, headache and right upper quadrant pain. Also denies nausea/vomiting/epigastric pain/shortness of breath. BPs controlled. Plan to to stop mag at 6PM. No pushes needed since 430 AM. Full labs in AM    O:  T(C): 37.1 (19 @ 16:10), Max: 37.1 (19 @ 07:01)  HR: 80 (19 @ 16:10) (60 - 82)  BP: 139/76 (19 @ 16:10) (108/60 - 167/77)  RR: 24 (19 @ 16:10) (12 - 36)  SpO2: 96% (19 @ 14:00) (79% - 97%)  Wt(kg): --  Daily Height in cm: 162.56 (2019 20:03)    Daily Weight in k (2019 07:01)     @ 07:01  -   @ 07:00  --------------------------------------------------------  IN: 350 mL / OUT: 2950 mL / NET: -2600 mL     @ 07:01  -   @ 16:42  --------------------------------------------------------  IN: 1525 mL / OUT: 4900 mL / NET: -3375 mL      Gen: NAD, AAOx3  CV: RRR, no M/R/G  Pulm: CTAB, no R/R/W  Abd: soft, nontender, no rebound or guarding, no epigastric tenderness, liver nonpalpable +BS.   : voiding   Ext: +1 edema virgil, SCDs in place, Reflexes 2+    acetaminophen   Tablet .. 650 milliGRAM(s) Oral every 6 hours PRN  dextrose 40% Gel 15 Gram(s) Oral once PRN  dextrose 5%. 1000 milliLiter(s) IV Continuous <Continuous>  dextrose 50% Injectable 12.5 Gram(s) IV Push once  dextrose 50% Injectable 25 Gram(s) IV Push once  dextrose 50% Injectable 25 Gram(s) IV Push once  glucagon  Injectable 1 milliGRAM(s) IntraMuscular once PRN  heparin  Injectable 7500 Unit(s) SubCutaneous every 8 hours  hydrALAZINE Injectable 10 milliGRAM(s) IV Push every 6 hours PRN  insulin lispro (HumaLOG) corrective regimen sliding scale   SubCutaneous Before meals and at bedtime  labetalol 300 milliGRAM(s) Oral every 8 hours  lactated ringers. 1000 milliLiter(s) IV Continuous <Continuous>  levothyroxine 125 MICROGram(s) Oral daily  magnesium sulfate Infusion 2 Gm/Hr IV Continuous <Continuous>  NIFEdipine XL 60 milliGRAM(s) Oral <User Schedule>    No Known Allergies                            10.5   10.90 )-----------( 329      ( 2019 14:48 )             32.8         140  |  100  |  8   ----------------------------<  152<H>  3.8   |  28  |  0.63    Ca    7.4<L>      2019 14:48  Phos  4.3       Mg     6.5         TPro  6.5  /  Alb  3.1<L>  /  TBili  0.2  /  DBili  x   /  AST  28  /  ALT  39  /  AlkPhos  62

## 2019-02-28 NOTE — PROGRESS NOTE ADULT - ASSESSMENT
POD6 C/S at 37+ weeks for severe range BPs on admission to labor floor (originally presented for routine fetal monitoring). Did well post op, discharged home on BP meds on POD4, returned on POD5 due to severe range BPs at home, admitted to ICU for BP control.   - stay on IV Mg 3g/hr for 24 hrs  - continue BP control in the ICU  - cardiology consult and repeat echo today  - re-consult nephrology (previously following)  - continue oral BP regimen  - OB closely following   - fingersticks/ISS (pt has home endocrinologist she will see on discharge)  - continue synthroid

## 2019-03-01 DIAGNOSIS — E03.9 HYPOTHYROIDISM, UNSPECIFIED: ICD-10-CM

## 2019-03-01 DIAGNOSIS — Z3A.37 37 WEEKS GESTATION OF PREGNANCY: ICD-10-CM

## 2019-03-01 DIAGNOSIS — E66.01 MORBID (SEVERE) OBESITY DUE TO EXCESS CALORIES: ICD-10-CM

## 2019-03-01 DIAGNOSIS — O43.813: ICD-10-CM

## 2019-03-01 DIAGNOSIS — Z34.03 ENCOUNTER FOR SUPERVISION OF NORMAL FIRST PREGNANCY, THIRD TRIMESTER: ICD-10-CM

## 2019-03-01 LAB
ALBUMIN SERPL ELPH-MCNC: 3 G/DL — LOW (ref 3.3–5)
ALP SERPL-CCNC: 55 U/L — SIGNIFICANT CHANGE UP (ref 40–120)
ALT FLD-CCNC: 34 U/L — SIGNIFICANT CHANGE UP (ref 10–45)
ANION GAP SERPL CALC-SCNC: 12 MMOL/L — SIGNIFICANT CHANGE UP (ref 5–17)
AST SERPL-CCNC: 23 U/L — SIGNIFICANT CHANGE UP (ref 10–40)
BILIRUB SERPL-MCNC: 0.2 MG/DL — SIGNIFICANT CHANGE UP (ref 0.2–1.2)
BUN SERPL-MCNC: 12 MG/DL — SIGNIFICANT CHANGE UP (ref 7–23)
CALCIUM SERPL-MCNC: 7.2 MG/DL — LOW (ref 8.4–10.5)
CHLORIDE SERPL-SCNC: 101 MMOL/L — SIGNIFICANT CHANGE UP (ref 96–108)
CO2 SERPL-SCNC: 27 MMOL/L — SIGNIFICANT CHANGE UP (ref 22–31)
CREAT SERPL-MCNC: 0.7 MG/DL — SIGNIFICANT CHANGE UP (ref 0.5–1.3)
GLUCOSE BLDC GLUCOMTR-MCNC: 113 MG/DL — HIGH (ref 70–99)
GLUCOSE BLDC GLUCOMTR-MCNC: 122 MG/DL — HIGH (ref 70–99)
GLUCOSE BLDC GLUCOMTR-MCNC: 125 MG/DL — HIGH (ref 70–99)
GLUCOSE BLDC GLUCOMTR-MCNC: 127 MG/DL — HIGH (ref 70–99)
GLUCOSE SERPL-MCNC: 151 MG/DL — HIGH (ref 70–99)
HCT VFR BLD CALC: 31.3 % — LOW (ref 34.5–45)
HGB BLD-MCNC: 10.1 G/DL — LOW (ref 11.5–15.5)
MCHC RBC-ENTMCNC: 29.5 PG — SIGNIFICANT CHANGE UP (ref 27–34)
MCHC RBC-ENTMCNC: 32.3 GM/DL — SIGNIFICANT CHANGE UP (ref 32–36)
MCV RBC AUTO: 91.5 FL — SIGNIFICANT CHANGE UP (ref 80–100)
NRBC # BLD: 0 /100 WBCS — SIGNIFICANT CHANGE UP (ref 0–0)
PLATELET # BLD AUTO: 327 K/UL — SIGNIFICANT CHANGE UP (ref 150–400)
POTASSIUM SERPL-MCNC: 3.8 MMOL/L — SIGNIFICANT CHANGE UP (ref 3.5–5.3)
POTASSIUM SERPL-SCNC: 3.8 MMOL/L — SIGNIFICANT CHANGE UP (ref 3.5–5.3)
PROT SERPL-MCNC: 6.1 G/DL — SIGNIFICANT CHANGE UP (ref 6–8.3)
RBC # BLD: 3.42 M/UL — LOW (ref 3.8–5.2)
RBC # FLD: 14.7 % — HIGH (ref 10.3–14.5)
SODIUM SERPL-SCNC: 140 MMOL/L — SIGNIFICANT CHANGE UP (ref 135–145)
WBC # BLD: 10.75 K/UL — HIGH (ref 3.8–10.5)
WBC # FLD AUTO: 10.75 K/UL — HIGH (ref 3.8–10.5)

## 2019-03-01 PROCEDURE — 93306 TTE W/DOPPLER COMPLETE: CPT | Mod: 26

## 2019-03-01 PROCEDURE — 99223 1ST HOSP IP/OBS HIGH 75: CPT

## 2019-03-01 RX ORDER — LEVOTHYROXINE SODIUM 125 MCG
175 TABLET ORAL DAILY
Qty: 0 | Refills: 0 | Status: DISCONTINUED | OUTPATIENT
Start: 2019-03-01 | End: 2019-03-02

## 2019-03-01 RX ORDER — LABETALOL HCL 100 MG
300 TABLET ORAL EVERY 8 HOURS
Qty: 0 | Refills: 0 | Status: DISCONTINUED | OUTPATIENT
Start: 2019-03-02 | End: 2019-03-02

## 2019-03-01 RX ADMIN — HEPARIN SODIUM 7500 UNIT(S): 5000 INJECTION INTRAVENOUS; SUBCUTANEOUS at 06:34

## 2019-03-01 RX ADMIN — HEPARIN SODIUM 7500 UNIT(S): 5000 INJECTION INTRAVENOUS; SUBCUTANEOUS at 14:27

## 2019-03-01 RX ADMIN — Medication 300 MILLIGRAM(S): at 04:14

## 2019-03-01 RX ADMIN — HEPARIN SODIUM 7500 UNIT(S): 5000 INJECTION INTRAVENOUS; SUBCUTANEOUS at 22:15

## 2019-03-01 RX ADMIN — Medication 175 MICROGRAM(S): at 08:53

## 2019-03-01 RX ADMIN — Medication 30 MILLIGRAM(S): at 09:00

## 2019-03-01 RX ADMIN — Medication 300 MILLIGRAM(S): at 12:00

## 2019-03-01 RX ADMIN — Medication 300 MILLIGRAM(S): at 19:35

## 2019-03-01 RX ADMIN — Medication 30 MILLIGRAM(S): at 22:14

## 2019-03-01 NOTE — PROGRESS NOTE ADULT - ASSESSMENT
POD7 C/S readmit for severe range BPs  - continue BP medication regimen  - follow up repeat echo today  - synthroid/ISS/endocrine outpatient  - will put on steri strips prior to discharge  - likely discharge home tomorrow

## 2019-03-01 NOTE — PROGRESS NOTE ADULT - SUBJECTIVE AND OBJECTIVE BOX
Patient evaluated at bedside.   She is without complaints and reports she feels much better.     She denies headache, dizziness, chest pain, palpitations, shortness of breath, nausea, vomiting or heavy vaginal bleeding.  She has been ambulating without assistance, voiding spontaneously, passing gas, tolerating regular diet and is breastfeeding.  Nml echo noted      PHYSICAL EXAM:    GA: NAD, A+0 x 3  CV: RRR  Pulm: CTA BL  Breasts: soft, nontender, no palpable masses  Abd: ( + ) BS, soft, nontender, nondistended, no rebound or guarding, obese  Incision: clean, dry and intact; steri-strips in place  Uterus: Fundus midline; firm at 4  fb below umbilicus  : lochia WNL; scant  Extremities: 2+ bl swelling.  No calf tenderness, reflexes +2 bilaterally.  Neg Holamn's sign BL                            10.1   10.75 )-----------( 327      ( 01 Mar 2019 06:46 )             31.3     03-01    140  |  101  |  12  ----------------------------<  151<H>  3.8   |  27  |  0.70    Ca    7.2<L>      01 Mar 2019 06:46  Phos  4.3     02-28  Mg     6.5     02-28    TPro  6.1  /  Alb  3.0<L>  /  TBili  0.2  /  DBili  x   /  AST  23  /  ALT  34  /  AlkPhos  55  03-          Assessment:    HD 3 s/p hypertensive crisis.  Doing well  POD #  8 s/p  delivery  DM    Plan:  Continue oral antihypertensives  Diet: regulars ADA diet  Pain meds: PRN  Activity: OOB ad myrna  Disposition:  Home in AM if BP's remain stable

## 2019-03-01 NOTE — PROGRESS NOTE ADULT - SUBJECTIVE AND OBJECTIVE BOX
Patient evaluated at bedside. She is feeling much better. No toxic complaints. BPs controlled overnight with labetalol 300mg TID and nifedipine 30mg BID. She is doing well postpartum/postop, meeting milestones. She reports pain is well controlled. She denies headache, dizziness, chest pain, palpitations, shortness of breath, nausea, vomiting or heavy vaginal bleeding.  She has been ambulating without assistance, voiding spontaneously, passing gas, tolerating regular diet and is pumping.    Physical Exam:  Vital Signs Last 24 Hrs  T(C): 37 (01 Mar 2019 08:30), Max: 37.1 (28 Feb 2019 16:10)  T(F): 98.6 (01 Mar 2019 08:30), Max: 98.8 (28 Feb 2019 16:10)  HR: 77 (01 Mar 2019 08:30) (60 - 80)  BP: 130/54 (01 Mar 2019 08:30) (108/60 - 167/77)  BP(mean): 80 (01 Mar 2019 06:34) (76 - 109)  RR: 18 (01 Mar 2019 08:30) (13 - 36)  SpO2: 98% (01 Mar 2019 06:34) (79% - 98%)    GA: NAD, A+0 x 3  Abd: soft, nontender, nondistended, no rebound or guarding, incision clean, dry and intact, uterus firm at midline, pressure dressing on  : lochia WNL  Extremities: ++swelling                           10.1   10.75 )-----------( 327      ( 01 Mar 2019 06:46 )             31.3     03-01    140  |  101  |  12  ----------------------------<  151<H>  3.8   |  27  |  0.70    Ca    7.2<L>      01 Mar 2019 06:46  Phos  4.3     02-28  Mg     6.5     02-28    TPro  6.1  /  Alb  3.0<L>  /  TBili  0.2  /  DBili  x   /  AST  23  /  ALT  34  /  AlkPhos  55  03-01      PT/INR - ( 27 Feb 2019 18:03 )   PT: 12.5 sec;   INR: 1.10          PTT - ( 27 Feb 2019 18:03 )  PTT:29.1 sec    MEDICATIONS  (STANDING):  dextrose 50% Injectable 25 Gram(s) IV Push once  heparin  Injectable 7500 Unit(s) SubCutaneous every 8 hours  insulin lispro (HumaLOG) corrective regimen sliding scale   SubCutaneous Before meals and at bedtime  labetalol 300 milliGRAM(s) Oral every 8 hours  levothyroxine 175 MICROGram(s) Oral daily  NIFEdipine XL 30 milliGRAM(s) Oral <User Schedule>    MEDICATIONS  (PRN):  acetaminophen   Tablet .. 650 milliGRAM(s) Oral every 6 hours PRN Moderate Pain (4 - 6)  hydrALAZINE Injectable 10 milliGRAM(s) IV Push every 6 hours PRN SBP> 160

## 2019-03-02 ENCOUNTER — TRANSCRIPTION ENCOUNTER (OUTPATIENT)
Age: 44
End: 2019-03-02

## 2019-03-02 VITALS
TEMPERATURE: 98 F | OXYGEN SATURATION: 97 % | RESPIRATION RATE: 18 BRPM | SYSTOLIC BLOOD PRESSURE: 144 MMHG | HEART RATE: 71 BPM | DIASTOLIC BLOOD PRESSURE: 80 MMHG

## 2019-03-02 LAB
GLUCOSE BLDC GLUCOMTR-MCNC: 101 MG/DL — HIGH (ref 70–99)
GLUCOSE BLDC GLUCOMTR-MCNC: 103 MG/DL — HIGH (ref 70–99)

## 2019-03-02 PROCEDURE — 99291 CRITICAL CARE FIRST HOUR: CPT | Mod: 25

## 2019-03-02 PROCEDURE — 96375 TX/PRO/DX INJ NEW DRUG ADDON: CPT

## 2019-03-02 PROCEDURE — 80053 COMPREHEN METABOLIC PANEL: CPT

## 2019-03-02 PROCEDURE — 93306 TTE W/DOPPLER COMPLETE: CPT

## 2019-03-02 PROCEDURE — 36415 COLL VENOUS BLD VENIPUNCTURE: CPT

## 2019-03-02 PROCEDURE — 96374 THER/PROPH/DIAG INJ IV PUSH: CPT

## 2019-03-02 PROCEDURE — 85025 COMPLETE CBC W/AUTO DIFF WBC: CPT

## 2019-03-02 PROCEDURE — 85610 PROTHROMBIN TIME: CPT

## 2019-03-02 PROCEDURE — 85730 THROMBOPLASTIN TIME PARTIAL: CPT

## 2019-03-02 PROCEDURE — 83615 LACTATE (LD) (LDH) ENZYME: CPT

## 2019-03-02 PROCEDURE — 85027 COMPLETE CBC AUTOMATED: CPT

## 2019-03-02 PROCEDURE — 84100 ASSAY OF PHOSPHORUS: CPT

## 2019-03-02 PROCEDURE — 84550 ASSAY OF BLOOD/URIC ACID: CPT

## 2019-03-02 PROCEDURE — 82962 GLUCOSE BLOOD TEST: CPT

## 2019-03-02 PROCEDURE — 99231 SBSQ HOSP IP/OBS SF/LOW 25: CPT

## 2019-03-02 PROCEDURE — 83735 ASSAY OF MAGNESIUM: CPT

## 2019-03-02 RX ORDER — ACETAMINOPHEN 500 MG
2 TABLET ORAL
Qty: 0 | Refills: 0 | COMMUNITY
Start: 2019-03-02

## 2019-03-02 RX ADMIN — Medication 300 MILLIGRAM(S): at 12:01

## 2019-03-02 RX ADMIN — HEPARIN SODIUM 7500 UNIT(S): 5000 INJECTION INTRAVENOUS; SUBCUTANEOUS at 07:06

## 2019-03-02 RX ADMIN — HEPARIN SODIUM 7500 UNIT(S): 5000 INJECTION INTRAVENOUS; SUBCUTANEOUS at 14:10

## 2019-03-02 RX ADMIN — Medication 30 MILLIGRAM(S): at 09:02

## 2019-03-02 RX ADMIN — Medication 300 MILLIGRAM(S): at 03:46

## 2019-03-02 RX ADMIN — Medication 175 MICROGRAM(S): at 07:45

## 2019-03-02 NOTE — DISCHARGE NOTE OB - HOSPITAL COURSE
pt readmitted w/ severe preeclampsia requiring IV magnesium and titration of blood pressure medication  pt also had chest symptoms, cardiology consulted and workup negative  nephrology also consulted, d/c medication regimen is nifedipine 30mg BID, labetalol 300mg TID  precautions reviewed, pt stable for d/c home and f/u as instructed

## 2019-03-02 NOTE — DISCHARGE NOTE OB - CARE PROVIDER_API CALL
Davina Salguero)  Obstetrics and Gynecology  [address] [address]  Phone: [phone]  Fax: [phone]  Follow Up Time:

## 2019-03-02 NOTE — DISCHARGE NOTE OB - MEDICATION SUMMARY - MEDICATIONS TO CHANGE
I will SWITCH the dose or number of times a day I take the medications listed below when I get home from the hospital:  None I will SWITCH the dose or number of times a day I take the medications listed below when I get home from the hospital:    Nifedical XL 30 mg oral tablet, extended release  -- 1 tab(s) by mouth once a day MDD:MDD: 1 tablet  -- Avoid grapefruit and grapefruit juice while taking this medication.  It is very important that you take or use this exactly as directed.  Do not skip doses or discontinue unless directed by your doctor.  Some non-prescription drugs may aggravate your condition.  Read all labels carefully.  If a warning appears, check with your doctor before taking.  Swallow whole.  Do not crush.

## 2019-03-02 NOTE — PROGRESS NOTE ADULT - ATTENDING COMMENTS
Pt was seen and examined.  I agree with above assessment and plan.  Ms. Nunez is stable for discharge home.
I too was at the bedside and examined Ms. Nunez.  I agree with the assessment and plan as documented.  Will follow

## 2019-03-02 NOTE — PROGRESS NOTE ADULT - SUBJECTIVE AND OBJECTIVE BOX
Patient evaluated at bedside. No acute events overnight. Patient stated that she had 2 episodes of loose stool following dinner. She felt that secondary to her meal for dinner. She denied fevers, chills, nausea, vomiting or severe abdominal cramping.     She denies any symptoms of preeclampsia to include headache, blurry vision, nausea or vomiting. No chest tightness or shortness of breath.    She has been ambulating without assistance, voiding spontaneously, and is breastfeeding.    Physical Exam:  Vital Signs Last 24 Hrs  T(C): 36.9 (02 Mar 2019 06:00), Max: 37.3 (02 Mar 2019 03:38)  T(F): 98.4 (02 Mar 2019 06:00), Max: 99.1 (02 Mar 2019 03:38)  HR: 70 (02 Mar 2019 06:00) (70 - 80)  BP: 147/72 (02 Mar 2019 06:00) (116/73 - 150/81)  RR: 18 (02 Mar 2019 06:00) (14 - 18)  SpO2: 97% (02 Mar 2019 06:00) (96% - 99%)    GA: NAD, A+0 x 3  CV: RRR  Pulm: CTAB  Breasts: soft, nontender, no palpable masses  Abd: morbidly obese, + BS, soft, nontender, nondistended, no rebound or guarding, uterus firm at midline, 2  fb below umbilicus  : minimal lochia   Extremities: 3+ pitting edema; calf tenderness, reflexes +2 bilaterally  Incision: dry and intact                          10.1   10.75 )-----------( 327      ( 01 Mar 2019 06:46 )             31.3     03-01    140  |  101  |  12  ----------------------------<  151<H>  3.8   |  27  |  0.70    Ca    7.2<L>      01 Mar 2019 06:46  Phos  4.3     02-28  Mg     6.5     02-28    TPro  6.1  /  Alb  3.0<L>  /  TBili  0.2  /  DBili  x   /  AST  23  /  ALT  34  /  AlkPhos  55  03-01

## 2019-03-02 NOTE — PROGRESS NOTE ADULT - ASSESSMENT
42 y/o , POD8 after repeat C/S readmit for preeclampsia.   1. Preeclampsia   -  continue BP medication regimen, labetalol 300 TID, Nifedipine 30. Blood pressures are adequately controlled and patient does not have any severely elevated blood pressures   - Echo 3/1 stable with improvement since Echo . Mild L Ventricular hypertrophy. No valvular disease.   - Continue synthroid 175 daily.   - Likely plan for discharge today with continued blood pressure monitoring outpatient.

## 2019-03-02 NOTE — DISCHARGE NOTE OB - CARE PLAN
Principal Discharge DX:	Preeclampsia, unspecified trimester  Goal:	follow-up with your doctors as instructed  Assessment and plan of treatment:	take your blood pressure medications  return to ED if severe headache unrelieved by tylenol, persistent blood pressure > 160/110, visual disturbances, abdominal pain in the center below your chest or on the upper right side, heavy vaginal bleeding, fever > 101

## 2019-03-02 NOTE — DISCHARGE NOTE OB - MEDICATION SUMMARY - MEDICATIONS TO TAKE
I will START or STAY ON the medications listed below when I get home from the hospital:    acetaminophen 325 mg oral tablet  -- 2 tab(s) by mouth every 6 hours, As needed, Moderate Pain (4 - 6)  -- Indication: For Pain    labetalol 300 mg oral tablet  -- 1 tab(s) by mouth every 8 hours MDD:MDD: 3 tablets  -- It is very important that you take or use this exactly as directed.  Do not skip doses or discontinue unless directed by your doctor.  May cause drowsiness.  Alcohol may intensify this effect.  Use care when operating dangerous machinery.  Some non-prescription drugs may aggravate your condition.  Read all labels carefully.  If a warning appears, check with your doctor before taking.    -- Indication: For Preeclampsia    Nifedical XL 30 mg oral tablet, extended release  -- 1 tab(s) by mouth once a day MDD:MDD: 1 tablet  -- Avoid grapefruit and grapefruit juice while taking this medication.  It is very important that you take or use this exactly as directed.  Do not skip doses or discontinue unless directed by your doctor.  Some non-prescription drugs may aggravate your condition.  Read all labels carefully.  If a warning appears, check with your doctor before taking.  Swallow whole.  Do not crush.    -- Indication: For Preeclampsia, unspecified trimester    Prenatal 1 oral capsule  -- Indication: For Postpartum    Synthroid 175 mcg (0.175 mg) oral tablet  -- 1 tab(s) by mouth once a day  -- Indication: For hypothyroid

## 2019-03-02 NOTE — DISCHARGE NOTE OB - PATIENT PORTAL LINK FT
You can access the [facility] Patient Portal, offered by [facility], by registering with the following website: [email or web address]

## 2019-03-02 NOTE — DISCHARGE NOTE OB - PLAN OF CARE
follow-up with your doctors as instructed take your blood pressure medications  return to ED if severe headache unrelieved by tylenol, persistent blood pressure > 160/110, visual disturbances, abdominal pain in the center below your chest or on the upper right side, heavy vaginal bleeding, fever > 101

## 2019-03-02 NOTE — PROGRESS NOTE ADULT - REASON FOR ADMISSION
Hypertensive Emergency - Preeclampsia

## 2019-03-04 ENCOUNTER — INPATIENT (INPATIENT)
Facility: HOSPITAL | Age: 44
LOS: 2 days | Discharge: HOME | DRG: 776 | End: 2019-03-07
Attending: GENERAL ACUTE CARE HOSPITAL | Admitting: GENERAL ACUTE CARE HOSPITAL
Payer: COMMERCIAL

## 2019-03-04 VITALS
WEIGHT: 282.85 LBS | DIASTOLIC BLOOD PRESSURE: 118 MMHG | TEMPERATURE: 98 F | RESPIRATION RATE: 18 BRPM | OXYGEN SATURATION: 100 % | HEART RATE: 73 BPM | SYSTOLIC BLOOD PRESSURE: 199 MMHG

## 2019-03-04 DIAGNOSIS — Z98.891 HISTORY OF UTERINE SCAR FROM PREVIOUS SURGERY: Chronic | ICD-10-CM

## 2019-03-04 LAB
ALBUMIN SERPL ELPH-MCNC: 3.6 G/DL — SIGNIFICANT CHANGE UP (ref 3.3–5)
ALP SERPL-CCNC: 52 U/L — SIGNIFICANT CHANGE UP (ref 40–120)
ALT FLD-CCNC: 26 U/L — SIGNIFICANT CHANGE UP (ref 10–45)
ANION GAP SERPL CALC-SCNC: 12 MMOL/L — SIGNIFICANT CHANGE UP (ref 5–17)
AST SERPL-CCNC: 20 U/L — SIGNIFICANT CHANGE UP (ref 10–40)
BILIRUB SERPL-MCNC: 0.2 MG/DL — SIGNIFICANT CHANGE UP (ref 0.2–1.2)
BUN SERPL-MCNC: 12 MG/DL — SIGNIFICANT CHANGE UP (ref 7–23)
CALCIUM SERPL-MCNC: 9 MG/DL — SIGNIFICANT CHANGE UP (ref 8.4–10.5)
CHLORIDE SERPL-SCNC: 104 MMOL/L — SIGNIFICANT CHANGE UP (ref 96–108)
CO2 SERPL-SCNC: 25 MMOL/L — SIGNIFICANT CHANGE UP (ref 22–31)
CREAT ?TM UR-MCNC: 70 MG/DL — SIGNIFICANT CHANGE UP
CREAT SERPL-MCNC: 0.86 MG/DL — SIGNIFICANT CHANGE UP (ref 0.5–1.3)
GLUCOSE SERPL-MCNC: 143 MG/DL — HIGH (ref 70–99)
HCT VFR BLD CALC: 33.7 % — LOW (ref 34.5–45)
HGB BLD-MCNC: 10.3 G/DL — LOW (ref 11.5–15.5)
LDH SERPL L TO P-CCNC: 357 U/L — HIGH (ref 50–242)
MCHC RBC-ENTMCNC: 28.6 PG — SIGNIFICANT CHANGE UP (ref 27–34)
MCHC RBC-ENTMCNC: 30.6 GM/DL — LOW (ref 32–36)
MCV RBC AUTO: 93.6 FL — SIGNIFICANT CHANGE UP (ref 80–100)
NRBC # BLD: 0 /100 WBCS — SIGNIFICANT CHANGE UP (ref 0–0)
PLATELET # BLD AUTO: 342 K/UL — SIGNIFICANT CHANGE UP (ref 150–400)
POTASSIUM SERPL-MCNC: 4.9 MMOL/L — SIGNIFICANT CHANGE UP (ref 3.5–5.3)
POTASSIUM SERPL-SCNC: 4.9 MMOL/L — SIGNIFICANT CHANGE UP (ref 3.5–5.3)
PROT ?TM UR-MCNC: 20 MG/DL — HIGH (ref 0–12)
PROT SERPL-MCNC: 7.1 G/DL — SIGNIFICANT CHANGE UP (ref 6–8.3)
PROT/CREAT UR-RTO: 0.3 RATIO — HIGH (ref 0–0.2)
RBC # BLD: 3.6 M/UL — LOW (ref 3.8–5.2)
RBC # FLD: 14.6 % — HIGH (ref 10.3–14.5)
SODIUM SERPL-SCNC: 141 MMOL/L — SIGNIFICANT CHANGE UP (ref 135–145)
URATE SERPL-MCNC: 6.2 MG/DL — SIGNIFICANT CHANGE UP (ref 2.5–7)
WBC # BLD: 12.95 K/UL — HIGH (ref 3.8–10.5)
WBC # FLD AUTO: 12.95 K/UL — HIGH (ref 3.8–10.5)

## 2019-03-04 PROCEDURE — 93010 ELECTROCARDIOGRAM REPORT: CPT

## 2019-03-04 PROCEDURE — 99291 CRITICAL CARE FIRST HOUR: CPT

## 2019-03-04 PROCEDURE — 99231 SBSQ HOSP IP/OBS SF/LOW 25: CPT

## 2019-03-04 RX ORDER — MAGNESIUM SULFATE 500 MG/ML
4 VIAL (ML) INJECTION ONCE
Qty: 0 | Refills: 0 | Status: COMPLETED | OUTPATIENT
Start: 2019-03-04 | End: 2019-03-04

## 2019-03-04 RX ORDER — NIFEDIPINE 30 MG
60 TABLET, EXTENDED RELEASE 24 HR ORAL DAILY
Qty: 0 | Refills: 0 | Status: DISCONTINUED | OUTPATIENT
Start: 2019-03-05 | End: 2019-03-05

## 2019-03-04 RX ORDER — MAGNESIUM SULFATE 500 MG/ML
6 VIAL (ML) INJECTION ONCE
Qty: 0 | Refills: 0 | Status: DISCONTINUED | OUTPATIENT
Start: 2019-03-04 | End: 2019-03-04

## 2019-03-04 RX ORDER — DEXTROSE 50 % IN WATER 50 %
25 SYRINGE (ML) INTRAVENOUS ONCE
Qty: 0 | Refills: 0 | Status: DISCONTINUED | OUTPATIENT
Start: 2019-03-04 | End: 2019-03-07

## 2019-03-04 RX ORDER — GLUCAGON INJECTION, SOLUTION 0.5 MG/.1ML
1 INJECTION, SOLUTION SUBCUTANEOUS ONCE
Qty: 0 | Refills: 0 | Status: DISCONTINUED | OUTPATIENT
Start: 2019-03-04 | End: 2019-03-07

## 2019-03-04 RX ORDER — DEXTROSE 50 % IN WATER 50 %
12.5 SYRINGE (ML) INTRAVENOUS ONCE
Qty: 0 | Refills: 0 | Status: DISCONTINUED | OUTPATIENT
Start: 2019-03-04 | End: 2019-03-05

## 2019-03-04 RX ORDER — LEVOTHYROXINE SODIUM 125 MCG
175 TABLET ORAL DAILY
Qty: 0 | Refills: 0 | Status: DISCONTINUED | OUTPATIENT
Start: 2019-03-05 | End: 2019-03-05

## 2019-03-04 RX ORDER — ACETAMINOPHEN 500 MG
650 TABLET ORAL EVERY 6 HOURS
Qty: 0 | Refills: 0 | Status: DISCONTINUED | OUTPATIENT
Start: 2019-03-04 | End: 2019-03-07

## 2019-03-04 RX ORDER — HYDRALAZINE HCL 50 MG
10 TABLET ORAL ONCE
Qty: 0 | Refills: 0 | Status: DISCONTINUED | OUTPATIENT
Start: 2019-03-04 | End: 2019-03-04

## 2019-03-04 RX ORDER — LABETALOL HCL 100 MG
10 TABLET ORAL ONCE
Qty: 0 | Refills: 0 | Status: COMPLETED | OUTPATIENT
Start: 2019-03-04 | End: 2019-03-04

## 2019-03-04 RX ORDER — DEXTROSE 50 % IN WATER 50 %
25 SYRINGE (ML) INTRAVENOUS ONCE
Qty: 0 | Refills: 0 | Status: DISCONTINUED | OUTPATIENT
Start: 2019-03-04 | End: 2019-03-05

## 2019-03-04 RX ORDER — SODIUM CHLORIDE 9 MG/ML
1000 INJECTION, SOLUTION INTRAVENOUS
Qty: 0 | Refills: 0 | Status: DISCONTINUED | OUTPATIENT
Start: 2019-03-04 | End: 2019-03-05

## 2019-03-04 RX ORDER — NIFEDIPINE 30 MG
30 TABLET, EXTENDED RELEASE 24 HR ORAL ONCE
Qty: 0 | Refills: 0 | Status: COMPLETED | OUTPATIENT
Start: 2019-03-04 | End: 2019-03-04

## 2019-03-04 RX ORDER — DEXTROSE 50 % IN WATER 50 %
15 SYRINGE (ML) INTRAVENOUS ONCE
Qty: 0 | Refills: 0 | Status: DISCONTINUED | OUTPATIENT
Start: 2019-03-04 | End: 2019-03-05

## 2019-03-04 RX ORDER — LABETALOL HCL 100 MG
300 TABLET ORAL THREE TIMES A DAY
Qty: 0 | Refills: 0 | Status: DISCONTINUED | OUTPATIENT
Start: 2019-03-05 | End: 2019-03-05

## 2019-03-04 RX ORDER — INSULIN LISPRO 100/ML
VIAL (ML) SUBCUTANEOUS
Qty: 0 | Refills: 0 | Status: DISCONTINUED | OUTPATIENT
Start: 2019-03-04 | End: 2019-03-07

## 2019-03-04 RX ADMIN — Medication 30 MILLIGRAM(S): at 23:37

## 2019-03-04 RX ADMIN — Medication 100 GRAM(S): at 23:00

## 2019-03-04 RX ADMIN — Medication 10 MILLIGRAM(S): at 21:15

## 2019-03-04 NOTE — ED PROVIDER NOTE - PRINCIPAL DIAGNOSIS
Hypertension complicating pregnancy, childbirth and puerperium with baby delivered and  complication

## 2019-03-04 NOTE — ED PROVIDER NOTE - CARE PLAN
Principal Discharge DX:	Hypertension complicating pregnancy, childbirth and puerperium with baby delivered and  complication

## 2019-03-04 NOTE — H&P ADULT - ASSESSMENT
43y G P1  POD #10 from primary  section on  presenting today with persistent severe range blood pressures. 43y G P1  POD #10 from primary  section on  presenting today with persistent severe range blood pressures, with postpartum preeclampsia.   Admit to labor and delivery for IV magnesium and blood pressure control.    -patient given IVP hydralazine 10mg, repeat BP was 143/87, thus no indication for ICU admission at this time  - if BP return to 200s/100s and unable to control with IVP medications and PO medications, then will contact ICU  - plan to continue with labetalol 300mg TID and increase nifedipine 60mg BID  - start IV magnesium for seizure prophylaxis in the setting of preeclampsia  - reconsult nephrology in morning  - consider other etiologies of HTN including renal artery stenosis, retained POC, cushing's, thyroid disease etc.   plan d/w Dr. Feng Houser

## 2019-03-04 NOTE — ED ADULT NURSE NOTE - CHPI ED NUR SYMPTOMS NEG
no back pain/no chills/no nausea/no shortness of breath/no syncope/no vomiting/no congestion/no diaphoresis/no fever/no chest pain/no dizziness

## 2019-03-04 NOTE — ED ADULT TRIAGE NOTE - CHIEF COMPLAINT QUOTE
S/P c section 2/22 -    with elevated BP, was told had  ecclampsia ; BP today at home 199/118; no dizziness , no headache

## 2019-03-04 NOTE — ED PROVIDER NOTE - OBJECTIVE STATEMENT
43F post partum from c-sxn complicated by htn POD # 10 presenting with htn again. Upgraded to me concern for pre-eclampsia. Pt's bp is 190/110 despite taking 3 doses of labetalol 300 mg and two doses of nifedipine 30 mg bid. Pt states she feels fine, denies ha, denies nausea, no vomiting no abd pain, no vision changes

## 2019-03-04 NOTE — H&P ADULT - HISTORY OF PRESENT ILLNESS
43y G P1  POD #10 from primary  section on  presenting today with persistent severe range blood pressures.   Patient had a primary  section for preeclampsia and was treated with IV Magnesium. Patient was readmitted to hospital  for severe range blood pressures, requiring ICU admission and IV magnesium treatment. Patient was discharged home on 3/2 on labetalol 300mg TID and Nifedipine 30mg BID, and was followed by Dr. Mejia nephrology for BP control.   Patient reports that she took her BP at home today and noted BP in 190-200 and called provider who recommend extra dose of labetalol 300mg, which she took with no reduction in BP.   Presented to ED with BP in 190-200's/'s. Was given extra dose of 30mg nifedipine at 9pm and IVP labetalol 10mg at . On presention /88 at 2145pm. Patient denies all toxic complaints including headache, spots in vision, nausea/vomiting, epigastric pain, SOB, or CP.       OB H/x:    GYN H/x:  H/x of cysts, fibroids, endometriosis:  H/x of STIs:  Name of GYN Physician:     PAST MEDICAL & SURGICAL HISTORY:  Hypothyroid  DM (diabetes mellitus) in pregnancy  S/P       MEDICATIONS  (STANDING):  hydrALAZINE Injectable 10 milliGRAM(s) IV Push Once  magnesium sulfate  IVPB 6 Gram(s) IV Intermittent Once    MEDICATIONS  (PRN):      Allergies    No Known Allergies    Intolerances           Vital Signs Last 24 Hrs  T(C): 36.7 (04 Mar 2019 20:13), Max: 36.7 (04 Mar 2019 20:13)  T(F): 98 (04 Mar 2019 20:13), Max: 98 (04 Mar 2019 20:13)  HR: 65 (04 Mar 2019 21:51) (65 - 73)  BP: 195/88 (04 Mar 2019 21:51) (192/110 - 201/91)  BP(mean): --  RR: 18 (04 Mar 2019 20:13) (18 - 18)  SpO2: 100% (04 Mar 2019 20:13) (100% - 100%)    Physical Exam:  Gen: NAD, comfortable  GI: soft, nontender, nondistended + BS, no rebound no guarding  BME: normal anteverted uterus, no adnexal masses appreciated    Spec:   Ext: no edema, erythema, tenderness     LABS:                        10.3   12.95 )-----------( 342      ( 04 Mar 2019 21:02 )             33.7     03-04    141  |  104  |  12  ----------------------------<  143<H>  4.9   |  25  |  0.86    Ca    9.0      04 Mar 2019 21:02    TPro  7.1  /  Alb  3.6  /  TBili  0.2  /  DBili  x   /  AST  20  /  ALT  26  /  AlkPhos  52  03-04          RADIOLOGY & ADDITIONAL STUDIES: 43y  POD #10 from primary  section on  presenting today with persistent severe range blood pressures.   Patient had a primary  section for preeclampsia with severe features and was treated with IV Magnesium. Patient was readmitted to hospital  for severe range blood pressures, requiring ICU admission and IV magnesium treatment. Patient was discharged home on 3/2 on labetalol 300mg TID and Nifedipine 30mg BID, and was followed by Dr. Mejia nephrology for BP control and cardiology.     Patient reports that she took her BP at home today and noted BP in 190-200 and called provider who recommend extra dose of labetalol 300mg, which she took with no reduction in BP.   Presented to ED with BP in 190-200's/'s. Was given extra dose of 30mg nifedipine at 9pm and IVP labetalol 10mg at . Total PO medication taken today: labetolol 300mg  x4, nifedpine 30mg x3.   On evaluation of patient, /88 at 2145pm. Patient denies all toxic complaints including headache, spots in vision, nausea/vomiting, epigastric pain, SOB, CP, lightheaded, or dizzy.    ObHx:   G1 - CS for preeclampsia with severe features 19 - Spontaneous pregnancy,  course with Dr. Salguero    GynHx: denies  PMHx: DM type II, hypothyroidism.  PSHx: open Appendectomy  (not perforated).  Meds: PNV, Synthroid 175mcg. Insulin   NKDA  Social: denies           Vital Signs Last 24 Hrs  T(C): 36.7 (04 Mar 2019 20:13), Max: 36.7 (04 Mar 2019 20:13)  T(F): 98 (04 Mar 2019 20:13), Max: 98 (04 Mar 2019 20:13)  HR: 65 (04 Mar 2019 21:51) (65 - 73)  BP: 195/88 (04 Mar 2019 21:51) (192/110 - 201/91)  BP(mean): --  RR: 18 (04 Mar 2019 20:13) (18 - 18)  SpO2: 100% (04 Mar 2019 20:13) (100% - 100%)    Physical Exam:  Gen: NAD, comfortable, A&Ox3  Pulmn: normal breath sounds bilaterally, no wheezes/rhonchi/rales  CV: s1, s2, no murmurs, rubs, gallops  GI: soft, nontender, nondistended + BS, no rebound no guarding  Ext: no edema, erythema, tenderness, reflexes 3+ bilaterally     LABS:                        10.3   12.95 )-----------( 342      ( 04 Mar 2019 21:02 )             33.7     03-04    141  |  104  |  12  ----------------------------<  143<H>  4.9   |  25  |  0.86    Ca    9.0      04 Mar 2019 21:02    TPro  7.1  /  Alb  3.6  /  TBili  0.2  /  DBili  x   /  AST  20  /  ALT  26  /  AlkPhos  52  03-04

## 2019-03-04 NOTE — ED PROVIDER NOTE - PHYSICAL EXAMINATION
gen: no acute distress, comfortable, conversant  HEENT: oropharynx clear  Neck: supple, no meningismus, no bruit noted bl carotid  CV: rrr no m/r/g 2+ radial pulse  Resp: ctab, no w/c/r  Abd: post partum lower abd incision c/d/i  Ext: no edema, pedal pulses 2+  Neuro: alert and oriented,mild hyperreflexia noted bl patellar

## 2019-03-04 NOTE — ED PROVIDER NOTE - PROGRESS NOTE DETAILS
pt took home dose of nifedipine 30 mg again here in ER, pt also took labetalol 300 mg again in ER given labetalol 10 without relief pt given hydralazine 10 with significant relief in bp of 140/90 will admit to ob

## 2019-03-05 DIAGNOSIS — O14.10 SEVERE PRE-ECLAMPSIA, UNSPECIFIED TRIMESTER: ICD-10-CM

## 2019-03-05 LAB
ALBUMIN SERPL ELPH-MCNC: 3.5 G/DL — SIGNIFICANT CHANGE UP (ref 3.3–5)
ALP SERPL-CCNC: 55 U/L — SIGNIFICANT CHANGE UP (ref 40–120)
ALT FLD-CCNC: 24 U/L — SIGNIFICANT CHANGE UP (ref 10–45)
ANION GAP SERPL CALC-SCNC: 12 MMOL/L — SIGNIFICANT CHANGE UP (ref 5–17)
AST SERPL-CCNC: 17 U/L — SIGNIFICANT CHANGE UP (ref 10–40)
BILIRUB SERPL-MCNC: 0.2 MG/DL — SIGNIFICANT CHANGE UP (ref 0.2–1.2)
BUN SERPL-MCNC: 9 MG/DL — SIGNIFICANT CHANGE UP (ref 7–23)
CALCIUM SERPL-MCNC: 9 MG/DL — SIGNIFICANT CHANGE UP (ref 8.4–10.5)
CHLORIDE SERPL-SCNC: 105 MMOL/L — SIGNIFICANT CHANGE UP (ref 96–108)
CO2 SERPL-SCNC: 26 MMOL/L — SIGNIFICANT CHANGE UP (ref 22–31)
CREAT SERPL-MCNC: 0.78 MG/DL — SIGNIFICANT CHANGE UP (ref 0.5–1.3)
GLUCOSE BLDC GLUCOMTR-MCNC: 111 MG/DL — HIGH (ref 70–99)
GLUCOSE BLDC GLUCOMTR-MCNC: 121 MG/DL — HIGH (ref 70–99)
GLUCOSE BLDC GLUCOMTR-MCNC: 152 MG/DL — HIGH (ref 70–99)
GLUCOSE BLDC GLUCOMTR-MCNC: 160 MG/DL — HIGH (ref 70–99)
GLUCOSE BLDC GLUCOMTR-MCNC: 181 MG/DL — HIGH (ref 70–99)
GLUCOSE SERPL-MCNC: 130 MG/DL — HIGH (ref 70–99)
HCT VFR BLD CALC: 36.3 % — SIGNIFICANT CHANGE UP (ref 34.5–45)
HGB BLD-MCNC: 11.1 G/DL — LOW (ref 11.5–15.5)
MAGNESIUM SERPL-MCNC: 4.2 MG/DL — HIGH (ref 1.6–2.6)
MAGNESIUM SERPL-MCNC: 5.7 MG/DL — HIGH (ref 1.6–2.6)
MAGNESIUM SERPL-MCNC: 6.1 MG/DL — HIGH (ref 1.6–2.6)
MCHC RBC-ENTMCNC: 28.2 PG — SIGNIFICANT CHANGE UP (ref 27–34)
MCHC RBC-ENTMCNC: 30.6 GM/DL — LOW (ref 32–36)
MCV RBC AUTO: 92.4 FL — SIGNIFICANT CHANGE UP (ref 80–100)
NRBC # BLD: 0 /100 WBCS — SIGNIFICANT CHANGE UP (ref 0–0)
PLATELET # BLD AUTO: 351 K/UL — SIGNIFICANT CHANGE UP (ref 150–400)
POTASSIUM SERPL-MCNC: 4.6 MMOL/L — SIGNIFICANT CHANGE UP (ref 3.5–5.3)
POTASSIUM SERPL-SCNC: 4.6 MMOL/L — SIGNIFICANT CHANGE UP (ref 3.5–5.3)
PROT SERPL-MCNC: 6.9 G/DL — SIGNIFICANT CHANGE UP (ref 6–8.3)
RBC # BLD: 3.93 M/UL — SIGNIFICANT CHANGE UP (ref 3.8–5.2)
RBC # FLD: 14.8 % — HIGH (ref 10.3–14.5)
SODIUM SERPL-SCNC: 143 MMOL/L — SIGNIFICANT CHANGE UP (ref 135–145)
T3FREE SERPL-MCNC: 1.85 PG/ML — SIGNIFICANT CHANGE UP (ref 1.71–3.71)
T4 AB SER-ACNC: 8.7 UG/DL — SIGNIFICANT CHANGE UP (ref 3.17–11.72)
T4 FREE SERPL-MCNC: 0.91 NG/DL — SIGNIFICANT CHANGE UP (ref 0.7–1.48)
T4 FREE SERPL-MCNC: 1 NG/DL — SIGNIFICANT CHANGE UP (ref 0.7–1.48)
TSH SERPL-MCNC: 35.05 UIU/ML — HIGH (ref 0.35–4.94)
WBC # BLD: 12.81 K/UL — HIGH (ref 3.8–10.5)
WBC # FLD AUTO: 12.81 K/UL — HIGH (ref 3.8–10.5)

## 2019-03-05 PROCEDURE — 78707 K FLOW/FUNCT IMAGE W/O DRUG: CPT | Mod: 26

## 2019-03-05 PROCEDURE — 71045 X-RAY EXAM CHEST 1 VIEW: CPT | Mod: 26

## 2019-03-05 PROCEDURE — 99255 IP/OBS CONSLTJ NEW/EST HI 80: CPT

## 2019-03-05 PROCEDURE — 99231 SBSQ HOSP IP/OBS SF/LOW 25: CPT

## 2019-03-05 RX ORDER — MAGNESIUM SULFATE 500 MG/ML
2 VIAL (ML) INJECTION
Qty: 40 | Refills: 0 | Status: DISCONTINUED | OUTPATIENT
Start: 2019-03-05 | End: 2019-03-05

## 2019-03-05 RX ORDER — NIFEDIPINE 30 MG
60 TABLET, EXTENDED RELEASE 24 HR ORAL
Qty: 0 | Refills: 0 | Status: DISCONTINUED | OUTPATIENT
Start: 2019-03-05 | End: 2019-03-06

## 2019-03-05 RX ORDER — LABETALOL HCL 100 MG
300 TABLET ORAL EVERY 8 HOURS
Qty: 0 | Refills: 0 | Status: DISCONTINUED | OUTPATIENT
Start: 2019-03-05 | End: 2019-03-06

## 2019-03-05 RX ORDER — LEVOTHYROXINE SODIUM 125 MCG
175 TABLET ORAL EVERY 24 HOURS
Qty: 0 | Refills: 0 | Status: DISCONTINUED | OUTPATIENT
Start: 2019-03-05 | End: 2019-03-06

## 2019-03-05 RX ORDER — NIFEDIPINE 30 MG
60 TABLET, EXTENDED RELEASE 24 HR ORAL ONCE
Qty: 0 | Refills: 0 | Status: COMPLETED | OUTPATIENT
Start: 2019-03-05 | End: 2019-03-05

## 2019-03-05 RX ORDER — SODIUM CHLORIDE 9 MG/ML
1000 INJECTION, SOLUTION INTRAVENOUS
Qty: 0 | Refills: 0 | Status: DISCONTINUED | OUTPATIENT
Start: 2019-03-05 | End: 2019-03-06

## 2019-03-05 RX ORDER — HYDRALAZINE HCL 50 MG
10 TABLET ORAL ONCE
Qty: 0 | Refills: 0 | Status: COMPLETED | OUTPATIENT
Start: 2019-03-05 | End: 2019-03-05

## 2019-03-05 RX ORDER — NIFEDIPINE 30 MG
60 TABLET, EXTENDED RELEASE 24 HR ORAL DAILY
Qty: 0 | Refills: 0 | Status: DISCONTINUED | OUTPATIENT
Start: 2019-03-05 | End: 2019-03-05

## 2019-03-05 RX ORDER — MAGNESIUM SULFATE 500 MG/ML
3 VIAL (ML) INJECTION
Qty: 40 | Refills: 0 | Status: DISCONTINUED | OUTPATIENT
Start: 2019-03-05 | End: 2019-03-05

## 2019-03-05 RX ADMIN — Medication 10 MILLIGRAM(S): at 02:57

## 2019-03-05 RX ADMIN — Medication 300 MILLIGRAM(S): at 21:45

## 2019-03-05 RX ADMIN — Medication 300 MILLIGRAM(S): at 05:15

## 2019-03-05 RX ADMIN — Medication 175 MICROGRAM(S): at 11:36

## 2019-03-05 RX ADMIN — Medication 50 GM/HR: at 03:00

## 2019-03-05 RX ADMIN — Medication 75 GM/HR: at 18:54

## 2019-03-05 RX ADMIN — Medication 300 MILLIGRAM(S): at 13:16

## 2019-03-05 RX ADMIN — Medication 2: at 15:35

## 2019-03-05 RX ADMIN — Medication 2: at 22:00

## 2019-03-05 RX ADMIN — Medication 2: at 11:36

## 2019-03-05 RX ADMIN — Medication 60 MILLIGRAM(S): at 20:55

## 2019-03-05 RX ADMIN — SODIUM CHLORIDE 50 MILLILITER(S): 9 INJECTION, SOLUTION INTRAVENOUS at 07:12

## 2019-03-05 RX ADMIN — Medication 60 MILLIGRAM(S): at 08:14

## 2019-03-05 NOTE — CONSULT NOTE ADULT - ASSESSMENT
Patient is a 42 yo  POD 10 from primary C section 2nd to preclampsia with severe features.   Nephrology consult is placed in regards to unresolving preeclampsia with severe features

## 2019-03-05 NOTE — PROGRESS NOTE ADULT - SUBJECTIVE AND OBJECTIVE BOX
Patient's arrival from ED was delayed, upon arrival to L+D /100, magnesium maintenance was not started.    Patient denies toxic complaints, due to severe range BP 10mg IV hydralazine was administered @ 2:57am, will plan to repeat BP in 20 min

## 2019-03-05 NOTE — PROGRESS NOTE ADULT - SUBJECTIVE AND OBJECTIVE BOX
S: Pt evaluated at bedside for magnesium check. She is tearful and reports being very upset about being back in hospital away from her baby.  Objectively she is feeling well; she denies visual disturbances, headache and right upper quadrant pain. Also denies nausea/vomiting/epigastric pain/shortness of breath. Pain well controlled.      O:  T(C): 36.6 (19 @ 00:16), Max: 36.7 (19 @ 20:13)  HR: 63 (19 @ 00:16) (63 - 73)  BP: 138/69 (19 @ 00:16) (138/69 - 201/91)  RR: 16 (19 @ 00:16) (16 - 18)  SpO2: 99% (19 @ 00:16) (99% - 100%)  Wt(kg): --  Daily Height in cm: 154.94 (05 Mar 2019 03:56)    Daily Weight Pre-pregnancy in k (05 Mar 2019 03:56)    03-04 @ 07:01  -  05 @ 05:56  --------------------------------------------------------  IN: 0 mL / OUT: 1400 mL / NET: -1400 mL      Gen: NAD, AAOx3  CV: RRR, early systolic murmur at base  Pulm: CTAB, no R/R/W  Abd: soft, nontender, no rebound or guarding, no epigastric tenderness, liver nonpalpable +BS.   Ext: +1 edema virgil, SCDs in place, Reflexes WNL                          10.3   12.95 )-----------( 342      ( 04 Mar 2019 21:02 )             33.7     03-04    141  |  104  |  12  ----------------------------<  143<H>  4.9   |  25  |  0.86    Ca    9.0      04 Mar 2019 21:02    TPro  7.1  /  Alb  3.6  /  TBili  0.2  /  DBili  x   /  AST  20  /  ALT  26  /  AlkPhos  52  03-04

## 2019-03-05 NOTE — PROVIDER CONTACT NOTE (OTHER) - ACTION/TREATMENT ORDERED:
Md will come evaluate if patient is appropriate for transfer
Per Dr. Marin , Magnesium ok to continue at matienence dose as ordered.

## 2019-03-05 NOTE — PROGRESS NOTE ADULT - ASSESSMENT
43y G P1  POD #10 from primary  section on  admitted for second time with  today with persistent severe range blood pressures, on third course of IV Magnesium 43y G P1  POD #10 from primary  section on  admitted for second time with  today with persistent severe range blood pressures, on third course of IV Magnesium    1.continue monitor blood pressure  2.official nephrology consult to adjust her blood pressure  3. repeat labs

## 2019-03-05 NOTE — CONSULT NOTE ADULT - SUBJECTIVE AND OBJECTIVE BOX
Renal consult  Patient is a 44 yo  POD 10 from primary C section 2nd to preclampsia with severe features.   Nephrology consult is placed in regards to unresolving preeclampsia with severe features  Since her discharge on , patient was readmitted again from  to 3/2 for uncontrolled HTN.   She was supposed to take nifedipine 30 mg po daily and labetalol 300 mg po TID>   patient denies adamantly any h/o HTN prior to 20 weeks of gestation. However, her ECHo ---> LVH.   Patient states she has been checking BP at home and has been in the 170s  on 3/4, Bp @ 199/118---> labetalol 10 mg IV+ hydralazine 10 mg IV.   SHe is currently on mag drip + nifedipine 60 mg daily and labetalol 300 mg po tid.   repeat urine pr/cr  @ 0.3<----1.8.  LFTS, platelets, cr has been WNL>  Pt denies any headaches, palpitations, diaphoresis, blurry vision, RUQ pain, nor dyspnea.   since being on mag drip, hEr BP readings have been in the 110s.      PMHx: DM type II, hypothyroidism.  PSHx: open Appendectomy  (not perforated).  Meds: PNV, Synthroid 175mcg. Insulin   NKDA  Social: denies           Vital Signs Last 24 Hrs  T(C): 36.7 (04 Mar 2019 20:13), Max: 36.7 (04 Mar 2019 20:13)  T(F): 98 (04 Mar 2019 20:13), Max: 98 (04 Mar 2019 20:13)  HR: 65 (04 Mar 2019 21:51) (65 - 73)  BP: 195/88 (04 Mar 2019 21:51) (192/110 - 201/91)  BP(mean): --  RR: 18 (04 Mar 2019 20:13) (18 - 18)  SpO2: 100% (04 Mar 2019 20:13) (100% - 100%)    Physical Exam:  Gen: NAD, comfortable, A&Ox3  Pulmn: normal breath sounds bilaterally, no wheezes/rhonchi/rales  CV: s1, s2, no murmurs, rubs, gallops  GI: soft, nontender, nondistended + BS, no rebound no guarding  Ext: no edema, erythema, tenderness, reflexes 3+ bilaterally     LABS:                        10.3   12.95 )-----------( 342      ( 04 Mar 2019 21:02 )             33.7     03-04    141  |  104  |  12  ----------------------------<  143<H>  4.9   |  25  |  0.86    Ca    9.0      04 Mar 2019 21:02    TPro  7.1  /  Alb  3.6  /  TBili  0.2  /  DBili  x   /  AST  20  /  ALT  26  /  AlkPhos  52  03-04 (04 Mar 2019 22:05)      PAST MEDICAL & SURGICAL HISTORY:  Hypothyroid  DM (diabetes mellitus) in pregnancy  S/P       Allergies    No Known Allergies    Intolerances        FAMILY HISTORY:  No pertinent family history in first degree relatives      SOCIAL HISTORY:  denies smoking, drugs    MEDICATIONS  (STANDING):  dextrose 5%. 1000 milliLiter(s) (50 mL/Hr) IV Continuous <Continuous>  dextrose 50% Injectable 12.5 Gram(s) IV Push once  dextrose 50% Injectable 25 Gram(s) IV Push once  dextrose 50% Injectable 25 Gram(s) IV Push once  insulin lispro (HumaLOG) corrective regimen sliding scale   SubCutaneous Before meals and at bedtime  labetalol 300 milliGRAM(s) Oral every 8 hours  lactated ringers. 1000 milliLiter(s) (50 mL/Hr) IV Continuous <Continuous>  levothyroxine 175 MICROGram(s) Oral every 24 hours  magnesium sulfate Infusion 3 Gm/Hr (75 mL/Hr) IV Continuous <Continuous>  NIFEdipine XL 60 milliGRAM(s) Oral <User Schedule>    MEDICATIONS  (PRN):  acetaminophen   Tablet .. 650 milliGRAM(s) Oral every 6 hours PRN Mild Pain (1 - 3)  dextrose 40% Gel 15 Gram(s) Oral once PRN Blood Glucose LESS THAN 70 milliGRAM(s)/deciliter  glucagon  Injectable 1 milliGRAM(s) IntraMuscular once PRN Glucose LESS THAN 70 milligrams/deciliter      REVIEW OF SYSTEMS:    CONSTITUTIONAL: No fever or chills, No fatigue or tiredness.  EYES: No blurred or double vision.  ENT: No recent URI or sore throat  RESPIRATORY: No shortness of breath, cough, hemoptysis  CARDIOVASCULAR: No Chest pain or shortness of breath  GASTROINTESTINAL: NO abdominal or flank pain, No nausea or vomiting, No diarrhea  GENITOURINARY: No dysuria or urinary burning, No difficulty passing urine, No hematuria  NEUROLOGICAL: No headaches or blurred vision  SKIN: No skin rashes   MUSCULOSKELETAL: No arthralgia, Joint pain, leg edema, No muscle pains        PHYSICAL EXAM:  GENERAL: NAD, obese  HEAD:  Atraumatic, Normocephalic,   EYES: Bilateral conjucniva and sclera normal,  Oral cavity: Oral mucosa dry and pink  NECK: Neck supple, No JVD  CHEST/LUNG: Clear to auscultation bilaterally; No wheeze, no rales, no crepitations  HEART: Regular rate and rhythm. JANENE II/VI at LPSB, No gallop, no rub   ABDOMEN: Soft, Nontender, BS+nt, No flank tenderness.   EXTREMITIES: No clubbing, cyanosis, or +2 pitting edema   Neurology: AAOx3, no focal neurological deficit  SKIN: No rashes or lesions      CAPILLARY BLOOD GLUCOSE      POCT Blood Glucose.: 152 mg/dL (05 Mar 2019 11:20)  POCT Blood Glucose.: 121 mg/dL (05 Mar 2019 07:04)  POCT Blood Glucose.: 111 mg/dL (05 Mar 2019 03:09)      I&O's Summary    04 Mar 2019 07:  -  05 Mar 2019 07:00  --------------------------------------------------------  IN: 0 mL / OUT: 1400 mL / NET: -1400 mL    05 Mar 2019 07:  -  05 Mar 2019 14:30  --------------------------------------------------------  IN: 0 mL / OUT: 2700 mL / NET: -2700 mL          LABS:                                                   19 @ 07:42    143  |  105  |  9   ----------------------------<  130<H>  4.6   |  26  |  0.78                                                 19 @ 21:02    141  |  104  |  12  ----------------------------<  143<H>  4.9   |  25  |  0.86    Ca    9.0      05 Mar 2019 07:42  Ca    9.0      04 Mar 2019 21:02  Mg     6.1     03-05  Mg     4.2     03-05    TPro  6.9  /  Alb  3.5  /  TBili  0.2  /  DBili  x   /  AST  17  /  ALT  24  /  AlkPhos  55  03-05  TPro  7.1  /  Alb  3.6  /  TBili  0.2  /  DBili  x   /  AST  20  /  ALT  26  /  AlkPhos  52  03-04                          11.1   12.81 )-----------( 351      ( 05 Mar 2019 07:42 )             36.3     CBC Full  -  ( 05 Mar 2019 07:42 )  WBC Count : 12.81 K/uL  Hemoglobin : 11.1 g/dL  Hematocrit : 36.3 %  Platelet Count - Automated : 351 K/uL  Mean Cell Volume : 92.4 fl      CBC Full  -  ( 04 Mar 2019 21:02 )  WBC Count : 12.95 K/uL  Hemoglobin : 10.3 g/dL  Hematocrit : 33.7 %  Platelet Count - Automated : 342 K/uL  Mean Cell Volume : 93.6 fl                  RADIOLOGY & ADDITIONAL TESTS:    EKG/Telemetry: Reviewed

## 2019-03-05 NOTE — PROVIDER CONTACT NOTE (OTHER) - SITUATION
Patient was cleared to transfer from [facility] 3 to [facility]. While awaiting transfer patient has nose bleed

## 2019-03-05 NOTE — CONSULT NOTE ADULT - PROBLEM SELECTOR RECOMMENDATION 9
with severe features  this admission represents patient 2nd readmission for same issue  started on mag drip  currently on nifedipine 60 mg daily and labetalol 300 mg po tid.   pt is s/p labetalol and hydralazine IV.  needs to rule out other etiologies like pheochromocytoma, C[place]  check 24 hr urine fractionated metanephrine and catecholamines  plasma fractionated metanephrine  check 24 hr urine cortisol secretion  r/o renal artery stenosi-> check renal US with doppler  low salt diet  continue current regimen  goal is to keep BP < 140/90  will adjust meds as needed  please do obtain records from OB to verify BP trend during whole pregnancy

## 2019-03-05 NOTE — PROVIDER CONTACT NOTE (OTHER) - SITUATION
Patient was transferred to Nuclear Medicine, Magnesium was stopped in Nuclear Med, Patient arrived to unit, Mag was paused for 2 hours total.

## 2019-03-06 LAB
COLLECT DURATION TIME UR: 24 HR — SIGNIFICANT CHANGE UP
GLUCOSE BLDC GLUCOMTR-MCNC: 111 MG/DL — HIGH (ref 70–99)
GLUCOSE BLDC GLUCOMTR-MCNC: 122 MG/DL — HIGH (ref 70–99)
GLUCOSE BLDC GLUCOMTR-MCNC: 126 MG/DL — HIGH (ref 70–99)
GLUCOSE BLDC GLUCOMTR-MCNC: 141 MG/DL — HIGH (ref 70–99)
PROT 24H UR-MRATE: 630 MG/24 H — HIGH (ref 50–100)
TOTAL VOLUME - 24 HOUR: 3000 ML — SIGNIFICANT CHANGE UP
URINE CREATININE CALCULATION: 1.6 G/24 H — SIGNIFICANT CHANGE UP (ref 0.8–1.8)

## 2019-03-06 PROCEDURE — 99231 SBSQ HOSP IP/OBS SF/LOW 25: CPT

## 2019-03-06 PROCEDURE — 99222 1ST HOSP IP/OBS MODERATE 55: CPT | Mod: GC

## 2019-03-06 PROCEDURE — 99233 SBSQ HOSP IP/OBS HIGH 50: CPT

## 2019-03-06 RX ORDER — NIFEDIPINE 30 MG
30 TABLET, EXTENDED RELEASE 24 HR ORAL
Qty: 0 | Refills: 0 | Status: DISCONTINUED | OUTPATIENT
Start: 2019-03-06 | End: 2019-03-07

## 2019-03-06 RX ORDER — NIFEDIPINE 30 MG
30 TABLET, EXTENDED RELEASE 24 HR ORAL ONCE
Qty: 0 | Refills: 0 | Status: COMPLETED | OUTPATIENT
Start: 2019-03-06 | End: 2019-03-06

## 2019-03-06 RX ORDER — NIFEDIPINE 30 MG
60 TABLET, EXTENDED RELEASE 24 HR ORAL DAILY
Qty: 0 | Refills: 0 | Status: DISCONTINUED | OUTPATIENT
Start: 2019-03-06 | End: 2019-03-06

## 2019-03-06 RX ORDER — LABETALOL HCL 100 MG
300 TABLET ORAL EVERY 8 HOURS
Qty: 0 | Refills: 0 | Status: DISCONTINUED | OUTPATIENT
Start: 2019-03-06 | End: 2019-03-06

## 2019-03-06 RX ORDER — DOCUSATE SODIUM 100 MG
100 CAPSULE ORAL DAILY
Qty: 0 | Refills: 0 | Status: DISCONTINUED | OUTPATIENT
Start: 2019-03-06 | End: 2019-03-07

## 2019-03-06 RX ORDER — LEVOTHYROXINE SODIUM 125 MCG
225 TABLET ORAL DAILY
Qty: 0 | Refills: 0 | Status: DISCONTINUED | OUTPATIENT
Start: 2019-03-06 | End: 2019-03-06

## 2019-03-06 RX ORDER — LABETALOL HCL 100 MG
200 TABLET ORAL EVERY 12 HOURS
Qty: 0 | Refills: 0 | Status: DISCONTINUED | OUTPATIENT
Start: 2019-03-06 | End: 2019-03-07

## 2019-03-06 RX ORDER — LEVOTHYROXINE SODIUM 125 MCG
225 TABLET ORAL DAILY
Qty: 0 | Refills: 0 | Status: DISCONTINUED | OUTPATIENT
Start: 2019-03-06 | End: 2019-03-07

## 2019-03-06 RX ADMIN — Medication 30 MILLIGRAM(S): at 10:52

## 2019-03-06 RX ADMIN — Medication 650 MILLIGRAM(S): at 22:26

## 2019-03-06 RX ADMIN — Medication 100 MILLIGRAM(S): at 18:20

## 2019-03-06 RX ADMIN — Medication 1 TABLET(S): at 18:20

## 2019-03-06 RX ADMIN — Medication 650 MILLIGRAM(S): at 23:00

## 2019-03-06 RX ADMIN — Medication 300 MILLIGRAM(S): at 05:10

## 2019-03-06 RX ADMIN — Medication 175 MICROGRAM(S): at 07:40

## 2019-03-06 RX ADMIN — Medication 200 MILLIGRAM(S): at 18:20

## 2019-03-06 NOTE — PROGRESS NOTE ADULT - ASSESSMENT
44yo P1 POD11 from C/S for PEC w SF second re-admit for severe range BPs  - nephrology consult  - endocrine consult  - routine postop care

## 2019-03-06 NOTE — PROGRESS NOTE ADULT - SUBJECTIVE AND OBJECTIVE BOX
Patient is a 43y Female seen and evaluated at bedside.   pt seen and examined  BP reviewed : dropped to 90 s overnight  most recent readings @ R 144/77 L @ 128/80  pt states she feels better, her edema has improved     acetaminophen   Tablet .. 650 every 6 hours PRN  dextrose 50% Injectable 25 once  glucagon  Injectable 1 once PRN  insulin lispro (HumaLOG) corrective regimen sliding scale  Before meals and at bedtime  labetalol 200 every 12 hours  levothyroxine 175 every 24 hours  NIFEdipine XL 30 <User Schedule>      Allergies    No Known Allergies    Intolerances        T(C): , Max: 36.9 (03-06-19 @ 06:00)  T(F): , Max: 98.5 (03-06-19 @ 06:00)  HR: 76 (03-06-19 @ 09:00)  BP: 114/61 (03-06-19 @ 09:00)  BP(mean): --  RR: 18 (03-06-19 @ 08:10)  SpO2: 97% (03-06-19 @ 08:10)  Wt(kg): --    03-05 @ 07:01  -  03-06 @ 07:00  --------------------------------------------------------  IN: 0 mL / OUT: 3625 mL / NET: -3625 mL          Review of Systems:  CONSTITUTIONAL: No fever or chills, No fatigue or tiredness.  EYES: No blurred or double vision.  RESPIRATORY: No shortness of breath, cough, hemoptysis  CARDIOVASCULAR: No Chest pain or shortness of breath  GASTROINTESTINAL: NO abdominal or flank pain, No nausea or vomiting, No diarrhea  GENITOURINARY: No dysuria or urinary burning, No difficulty passing urine, No hematuria  NEUROLOGICAL: No headaches or blurred vision  SKIN: No skin rashes   MUSCULOSKELETAL: + LE edema       PHYSICAL EXAM:  GENERAL: NAD, obese  HEAD:  Atraumatic, Normocephalic,   EYES: Bilateral conjucniva and sclera normal,  Oral cavity: Oral mucosa dry and pink  NECK: Neck supple, No JVD  CHEST/LUNG: Clear to auscultation bilaterally; No wheeze, no rales, no crepitations  HEART: Regular rate and rhythm. JANENE II/VI at LPSB, No gallop, no rub   ABDOMEN: Soft, Nontender, BS+nt, No flank tenderness.   EXTREMITIES: No clubbing, cyanosis, or +2 pitting edema   Neurology: AAOx3, no focal neurological deficit  SKIN: No rashes or lesions          LABS:                        11.1   12.81 )-----------( 351      ( 05 Mar 2019 07:42 )             36.3     03-05    143  |  105  |  9   ----------------------------<  130<H>  4.6   |  26  |  0.78    Ca    9.0      05 Mar 2019 07:42  Mg     5.7     03-05    TPro  6.9  /  Alb  3.5  /  TBili  0.2  /  DBili  x   /  AST  17  /  ALT  24  /  AlkPhos  55  03-05          Creatinine, Random Urine: 70 mg/dL (03-04 @ 21:42)  Protein/Creatinine Ratio Calculation: 0.3 Ratio (03-04 @ 21:42)        RADIOLOGY & ADDITIONAL STUDIES:

## 2019-03-06 NOTE — CONSULT NOTE ADULT - SUBJECTIVE AND OBJECTIVE BOX
HPI: 43yFemale    42 y/o F w/ Hx of pre gestational Dm2, Obesity and hypothyroidism s/p emergent c section on  () 2/2 preeclampsia.  Was on large doses of insulin prior to delivery, and has followed the usual pattern of a marked increase in insulin sensitivity post-partum. Discharged on metformin 1000mg BID and levothyroxine 175mcg prepregnancy dose). Discharged on  and returned to ED with c/o SBP > 200's at home and in ED requiring IV magnesium and titration of BP meds. Cardiology consulted and workup negative.  nephrology also consulted with d/c medication regimen of nifedipine 30mg BID and labetalol 300mg TID. F/u with Dr Mejia. Pt discharged on 3/2. Presented to ED again on 3/5 due to elevated BP at home ,190-200. Pt had notified outpt provider who recommend extra dose of labetalol 300mg, which she took with no reduction in BP.   Presented to ED with BP in 190-200's/'s. Was given extra dose of 30mg nifedipine at 9pm and IVP labetalol 10mg at . Total PO medication taken today: labetolol 300mg  x4, nifedpine 30mg x3.     Nehro: Problem: Preeclampsia, severe. Recommendation: with severe features  this admission represents patient 2nd readmission for same issue  started on mag drip. Secondary HTN w/u incl pau, pra, plasma metanephrine, cortisol, TSH, renal sono w arterial doppler to evaluate renal arteries.      currently on nifedipine 60 mg daily and labetalol 300 mg po tid.   pt is s/p labetalol and hydralazine IV.  needs to rule out other etiologies like pheochromocytoma, Cushing  check 24 hr urine fractionated metanephrine and catecholamines  plasma fractionated metanephrine  check 24 hr urine cortisol secretion  r/o renal artery stenosi-> check renal US with doppler  low salt diet  continue current regimen  goal is to keep BP < 140/90  will adjust meds as needed  please do obtain records from OB to verify BP trend during whole pregnancy.    NM renal study performed. Read pending.      Age at Dx:  How dx:  Hx and duration of insulin:  Current Therapy:  Hx of hypoglycemia  Hx of DKA/HHS?    Home FSG:  Fasting  Lunch  Dinner  Bed    Hx of other regimens  Complications:  Outpatient Endo:    PMH & Surgical Hx:HYPERTENSION COMPLICATING PREGNANCY  No pertinent family history in first degree relatives  Handoff  MEWS Score  Hypothyroid  DM (diabetes mellitus) in pregnancy  Hypertension complicating pregnancy, childbirth and puerperium with baby delivered and  complication  Preeclampsia, severe  S/P   HIGH BP  2      FH:  DM:  Thyroid:  Autoimmune:  Other:    SH:  Smoking  Etoh:  Recreational Drugs:  Social Life:    Current Meds:  acetaminophen   Tablet .. 650 milliGRAM(s) Oral every 6 hours PRN  dextrose 50% Injectable 25 Gram(s) IV Push once  glucagon  Injectable 1 milliGRAM(s) IntraMuscular once PRN  insulin lispro (HumaLOG) corrective regimen sliding scale   SubCutaneous Before meals and at bedtime  labetalol 300 milliGRAM(s) Oral every 8 hours  levothyroxine 175 MICROGram(s) Oral every 24 hours  NIFEdipine XL 60 milliGRAM(s) Oral <User Schedule>      Allergies:  No Known Allergies      ROS:  Denies the following except as indicated.    General: weight loss/weight gain, decreased appetite, fatigue  Eyes: Blurry vision, double vision, visual changes  ENT: Throat pain, changes in voice,   CV: palpitations, SOB, CP, cough  GI: NVD, difficulty swallowing, abdominal pain  : polyuria, dysuria  Endo: abnormal menses, temperature intolerance, decreased libido  MSK: weakness, joint pain  Skin: rash, dryness, diaphoresis  Heme: Easy bruising,bleeding  Neuro: HA, dizziness, lightheadedness, numbness tingling  Psych: Anxiety, Depression    Vital Signs Last 24 Hrs  T(C): 36.7 (06 Mar 2019 08:10), Max: 36.9 (06 Mar 2019 06:00)  T(F): 98.1 (06 Mar 2019 08:10), Max: 98.5 (06 Mar 2019 06:00)  HR: 76 (06 Mar 2019 09:00) (62 - 107)  BP: 114/61 (06 Mar 2019 09:00) (92/59 - 134/56)  BP(mean): --  RR: 18 (06 Mar 2019 08:10) (17 - 18)  SpO2: 97% (06 Mar 2019 08:10) (96% - 98%)  Height (cm): 154.94 ( @ 03:56)  Weight (kg): 128.3 ( @ 20:13)  BMI (kg/m2): 53.4 ( @ 03:56)      Constitutional: wn/wd in NAD.   HEENT: NCAT, MMM, OP clear, EOMI, , no proptosis or lid retraction  Neck: no thyromegaly or palpable thyroid nodules   Respiratory: lungs CTAB.  Cardiovascular: regular rhythm, normal S1 and S2, no audible murmurs, no peripheral edema  GI: soft, NT/ND, no masses/HSM appreciated.  Neurology: no tremors, DTR 2+  Skin: no visible rashes/lesions  Psychiatric: AAO x 3, normal affect/mood.  Ext: radial pulses intact, DP pulses intact, extremities warm, no cyanosis, clubbing or edema.       LABS:                        11.1   12.81 )-----------( 351      ( 05 Mar 2019 07:42 )             36.3         143  |  105  |  9   ----------------------------<  130<H>  4.6   |  26  |  0.78    Ca    9.0      05 Mar 2019 07:42  Mg     5.7         TPro  6.9  /  Alb  3.5  /  TBili  0.2  /  DBili  x   /  AST  17  /  ALT  24  /  AlkPhos  55  -        Hemoglobin A1C, Whole Blood: 6.7 ( @ 05:56)    Thyroid Stimulating Hormone, Serum: 35.051 ( @ 07:42)      RADIOLOGY & ADDITIONAL STUDIES:  CAPILLARY BLOOD GLUCOSE      POCT Blood Glucose.: 111 mg/dL (06 Mar 2019 06:33)  POCT Blood Glucose.: 160 mg/dL (05 Mar 2019 21:47)  POCT Blood Glucose.: 181 mg/dL (05 Mar 2019 15:31)  POCT Blood Glucose.: 152 mg/dL (05 Mar 2019 11:20)      Will Discuss in Rounds  A/P: 43y pt with GD, hypothyroidism  POD #12 from primary  section on  presenting with persistent severe range blood pressures, with postpartum preeclampsia.       1.  DM  Please continue lantus       units at night / morning.  Please continue lispro      units before each meal.  Please continue lispro moderate / low dose sliding scale four times daily with meals and at bedtime    Pt's fingerstick glucose goal is     Will continue to monitor     For discharge, pt can continue    Pt can follow up at discharge with [facility] by calling  to make an appointment.   Will discuss case with     and update primary team HPI: 42 y/o F w/ Hx of pre diabetes and gestational Dm2, Obesity and hypothyroidism s/p emergent c section on  at 37 weeks () 2/2 preeclampsia.  Discharged on  and returned to ED on  with c/o SBP > 200's at home and in ED requiring IV magnesium and titration of BP meds. Cardiology consulted and workup negative. Nephrology also consulted with d/c medication regimen of nifedipine 30mg BID and labetalol 300mg TID. F/u with Dr Mejia. Pt discharged on 3/2. Presented to ED again on 3/5 due to elevated BP at home ,190-200. Pt had notified outpt provider who recommend extra dose of labetalol 300mg, which she took with no reduction in BP. Presented to ED with BP in 190-200's/'s. BP now stable. Pt denies headache, vision changes, chest pain, palpitations, temperature intolerance, diaphoresis, constipation.     Hypothyroidism:  Pre pregnancy levothyroxine 175 mcg daily. Pregnancy 300mcg daily. Pt discharged on pre pregnancy dose of 175 mcgs daily. As she hasn't been home very much mediation compliance doesn't seem to be a issue. Pt reports taking med while at home in the AM with nothing else. TSH 35. FT4 1.00. FT3 1.85. TT4 8.7. Pt denies hypoglycemic symptoms as above.       Nehro: Problem: Preeclampsia, severe. Recommendation: with severe features  this admission represents patient 2nd readmission for same issue  started on mag drip. Secondary HTN w/u incl pau, pra, plasma metanephrine, cortisol, TSH, renal sono w arterial doppler to evaluate renal arteries.      currently on nifedipine 60 mg daily and labetalol 300 mg po tid.   pt is s/p labetalol and hydralazine IV.  needs to rule out other etiologies like pheochromocytoma, Cushing  check 24 hr urine fractionated metanephrine and catecholamines  plasma fractionated metanephrine  check 24 hr urine cortisol secretion  r/o renal artery stenosi-> check renal US with doppler  low salt diet  continue current regimen  goal is to keep BP < 140/90  will adjust meds as needed  please do obtain records from OB to verify BP trend during whole pregnancy.    NM renal study performed. Read pending.      Age at Dx:  How dx:  Hx and duration of insulin:  Current Therapy:  Hx of hypoglycemia  Hx of DKA/HHS?    Home FSG:  Fasting  Lunch  Dinner  Bed    Hx of other regimens  Complications:  Outpatient Endo:    PMH & Surgical Hx:HYPERTENSION COMPLICATING PREGNANCY  No pertinent family history in first degree relatives  Handoff  MEWS Score  Hypothyroid  DM (diabetes mellitus) in pregnancy  Hypertension complicating pregnancy, childbirth and puerperium with baby delivered and  complication  Preeclampsia, severe  S/P   HIGH BP  2      FH:  DM:  Thyroid:  Autoimmune:  Other:    SH:  Smoking  Etoh:  Recreational Drugs:  Social Life:    Current Meds:  acetaminophen   Tablet .. 650 milliGRAM(s) Oral every 6 hours PRN  dextrose 50% Injectable 25 Gram(s) IV Push once  glucagon  Injectable 1 milliGRAM(s) IntraMuscular once PRN  insulin lispro (HumaLOG) corrective regimen sliding scale   SubCutaneous Before meals and at bedtime  labetalol 300 milliGRAM(s) Oral every 8 hours  levothyroxine 175 MICROGram(s) Oral every 24 hours  NIFEdipine XL 60 milliGRAM(s) Oral <User Schedule>      Allergies:  No Known Allergies      ROS:  Denies the following except as indicated.    General: weight loss/weight gain, decreased appetite, fatigue  Eyes: Blurry vision, double vision, visual changes  ENT: Throat pain, changes in voice,   CV: palpitations, SOB, CP, cough  GI: NVD, difficulty swallowing, abdominal pain  : polyuria, dysuria  Endo: abnormal menses, temperature intolerance, decreased libido  MSK: weakness, joint pain  Skin: rash, dryness, diaphoresis  Heme: Easy bruising,bleeding  Neuro: HA, dizziness, lightheadedness, numbness tingling  Psych: Anxiety, Depression    Vital Signs Last 24 Hrs  T(C): 36.7 (06 Mar 2019 08:10), Max: 36.9 (06 Mar 2019 06:00)  T(F): 98.1 (06 Mar 2019 08:10), Max: 98.5 (06 Mar 2019 06:00)  HR: 76 (06 Mar 2019 09:00) (62 - 107)  BP: 114/61 (06 Mar 2019 09:00) (92/59 - 134/56)  BP(mean): --  RR: 18 (06 Mar 2019 08:10) (17 - 18)  SpO2: 97% (06 Mar 2019 08:10) (96% - 98%)  Height (cm): 154.94 (03-05 @ 03:56)  Weight (kg): 128.3 ( @ 20:13)  BMI (kg/m2): 53.4 ( @ 03:56)      Constitutional: wn/wd in NAD.   HEENT: NCAT, MMM, OP clear, EOMI, , no proptosis or lid retraction  Neck: no thyromegaly or palpable thyroid nodules   Respiratory: lungs CTAB.  Cardiovascular: regular rhythm, normal S1 and S2, no audible murmurs, no peripheral edema  GI: soft, NT/ND, no masses/HSM appreciated.  Neurology: no tremors, DTR 2+  Skin: no visible rashes/lesions  Psychiatric: AAO x 3, normal affect/mood.  Ext: radial pulses intact, DP pulses intact, extremities warm, no cyanosis, clubbing or edema.       LABS:                        11.1   12.81 )-----------( 351      ( 05 Mar 2019 07:42 )             36.3     -    143  |  105  |  9   ----------------------------<  130<H>  4.6   |  26  |  0.78    Ca    9.0      05 Mar 2019 07:42  Mg     5.7     -    TPro  6.9  /  Alb  3.5  /  TBili  0.2  /  DBili  x   /  AST  17  /  ALT  24  /  AlkPhos  55  03-05        Hemoglobin A1C, Whole Blood: 6.7 ( @ 05:56)    Thyroid Stimulating Hormone, Serum: 35.051 ( @ 07:42)      RADIOLOGY & ADDITIONAL STUDIES:  CAPILLARY BLOOD GLUCOSE      POCT Blood Glucose.: 111 mg/dL (06 Mar 2019 06:33)  POCT Blood Glucose.: 160 mg/dL (05 Mar 2019 21:47)  POCT Blood Glucose.: 181 mg/dL (05 Mar 2019 15:31)  POCT Blood Glucose.: 152 mg/dL (05 Mar 2019 11:20)      Will Discuss in Rounds  A/P: 43y pt with GD, hypothyroidism  POD #12 from primary  section on  presenting with persistent severe range blood pressures, with postpartum preeclampsia.       1.  DM  Please continue lantus       units at night / morning.  Please continue lispro      units before each meal.  Please continue lispro moderate / low dose sliding scale four times daily with meals and at bedtime    Pt's fingerstick glucose goal is     Will continue to monitor     For discharge, pt can continue    Pt can follow up at discharge with [facility] by calling  to make an appointment.   Will discuss case with     and update primary team HPI: 44 y/o F w/ Hx of pre diabetes and gestational Dm2, Obesity and hypothyroidism s/p emergent c section on  at 37 weeks () 2/2 preeclampsia.  Discharged on  and returned to ED on  with c/o SBP > 200's at home and in ED requiring IV magnesium and titration of BP meds. Cardiology consulted and workup negative. Nephrology also consulted with d/c medication regimen of nifedipine 30mg BID and labetalol 300mg TID. F/u with Dr Mejia. Pt discharged on 3/2. Presented to ED again on 3/5 due to elevated BP at home ,190-200. Pt had notified outpt provider who recommend extra dose of labetalol 300mg, which she took with no reduction in BP. Presented to ED with BP in 190-200's/'s. BP now stable. Pt denies headache, vision changes, chest pain, palpitations, temperature intolerance, diaphoresis, constipation.     Hypothyroidism:  Pre pregnancy levothyroxine 175 mcg daily. This was a decrease from 225 mcg daily after substantial weight loss. Pregnancy 300mcg daily. Pt discharged on pre pregnancy dose of 175 mcg daily. As she hasn't been home very much mediation compliance doesn't seem to be a issue. Pt reports taking med while at home in the AM with nothing else. TSH 35. FT4 1.00. FT3 1.85. TT4 8.7. Pt denies hypothyroid symptoms as above.     Diabetes:  Diagnosed approx 7 years ago as prediabetic. Initially started on metformin but couldn't tolerate pill size. Placed on Victoza for 1 year in 2017 with subsequent 80lbs weight loss and normalization of BG. Was not on any antidiabetes medication again until pregnancy. She was on Levemir 55 BID and humalog TID during pregnancy. Not discharged on any medications. BG have been well controlled since delivery. BG in hospital range 111-181.    Outpatient Endo: Has private endocrinologist. Is interested in transferring care to our clinic, as endocrinologist recently moved.     PMH & Surgical Hx:HYPERTENSION COMPLICATING PREGNANCY  No pertinent family history in first degree relatives  Handoff  MEWS Score  Hypothyroid  DM (diabetes mellitus) in pregnancy  Hypertension complicating pregnancy, childbirth and puerperium with baby delivered and  complication  Preeclampsia, severe  S/P   HIGH BP  2      FH:  DM: denies  Thyroid: denies  Autoimmune: denies      SH:  Smoking: denies  Etoh: denies  Recreational Drugs: denies  Social Life: lives with family    Current Meds:  acetaminophen   Tablet .. 650 milliGRAM(s) Oral every 6 hours PRN  dextrose 50% Injectable 25 Gram(s) IV Push once  glucagon  Injectable 1 milliGRAM(s) IntraMuscular once PRN  insulin lispro (HumaLOG) corrective regimen sliding scale   SubCutaneous Before meals and at bedtime  labetalol 300 milliGRAM(s) Oral every 8 hours  levothyroxine 175 MICROGram(s) Oral every 24 hours  NIFEdipine XL 60 milliGRAM(s) Oral <User Schedule>      Allergies:  No Known Allergies      ROS:  Denies the following except as indicated.    General: weight loss/weight gain, decreased appetite, fatigue  Eyes: Blurry vision, double vision, visual changes  ENT: Throat pain, changes in voice,   CV: palpitations, SOB, CP, cough  GI: NVD, difficulty swallowing, abdominal pain  : polyuria, dysuria  Endo: abnormal menses, temperature intolerance, decreased libido  MSK: weakness, joint pain  Skin: rash, dryness, diaphoresis  Heme: Easy bruising,bleeding  Neuro: HA, dizziness, lightheadedness, numbness tingling  Psych: Anxiety, Depression    Vital Signs Last 24 Hrs  T(C): 36.7 (06 Mar 2019 08:10), Max: 36.9 (06 Mar 2019 06:00)  T(F): 98.1 (06 Mar 2019 08:10), Max: 98.5 (06 Mar 2019 06:00)  HR: 76 (06 Mar 2019 09:00) (62 - 107)  BP: 114/61 (06 Mar 2019 09:00) (92/59 - 134/56)  BP(mean): --  RR: 18 (06 Mar 2019 08:10) (17 - 18)  SpO2: 97% (06 Mar 2019 08:10) (96% - 98%)  Height (cm): 154.94 ( @ 03:56)  Weight (kg): 128.3 ( @ 20:13)  BMI (kg/m2): 53.4 ( @ 03:56)      Constitutional: wn/wd in NAD.   HEENT: NCAT, MMM, OP clear, EOMI, , no proptosis or lid retraction  Neck: no thyromegaly or palpable thyroid nodules   Respiratory: lungs CTAB.  Cardiovascular: regular rhythm, normal S1 and S2, no audible murmurs, B/L LES 2+ edema, abdominal edema with skin changes  GI: soft, NT/ND, no masses/HSM appreciated.  Neurology: no tremors, DTR 2+  Skin: no visible rashes/lesions  Psychiatric: AAO x 3, normal affect/mood.  Ext: radial pulses intact, DP pulses intact, extremities warm, no cyanosis, clubbing      LABS:                        11.1   12.81 )-----------( 351      ( 05 Mar 2019 07:42 )             36.3     03-05    143  |  105  |  9   ----------------------------<  130<H>  4.6   |  26  |  0.78    Ca    9.0      05 Mar 2019 07:42  Mg     5.7     03-05    TPro  6.9  /  Alb  3.5  /  TBili  0.2  /  DBili  x   /  AST  17  /  ALT  24  /  AlkPhos  55  03-05        Hemoglobin A1C, Whole Blood: 6.7 ( @ 05:56)    Thyroid Stimulating Hormone, Serum: 35.051 ( @ 07:42)    CAPILLARY BLOOD GLUCOSE      POCT Blood Glucose.: 111 mg/dL (06 Mar 2019 06:33)  POCT Blood Glucose.: 160 mg/dL (05 Mar 2019 21:47)  POCT Blood Glucose.: 181 mg/dL (05 Mar 2019 15:31)  POCT Blood Glucose.: 152 mg/dL (05 Mar 2019 11:20)      A/P: 43y pt with GD, hypothyroidism  POD #12 from primary  section on  presenting with persistent severe range blood pressures, with postpartum preeclampsia found to have TSH of 35.    1. Hypothyroidism--  Please increase levothyroxine to 225 mcgs daily.    2.  DM--gestational, controlled, uncomplicated  Please continue lispro moderate dose sliding scale four times daily with meals and at bedtime    Pt's fingerstick glucose goal is 100-180.    Will continue to monitor     For discharge, pt can continue levothyroxine 225mcg.     Pt can follow up at discharge with [facility] by calling  to make an appointment.   Will discuss case with Dr. De La Paz   and update primary team

## 2019-03-06 NOTE — PROGRESS NOTE ADULT - ASSESSMENT
Patient is a 44 yo  POD 10 from primary C section 2nd to preclampsia with severe features.   Nephrology consult is placed in regards to unresolving preeclampsia with severe features

## 2019-03-06 NOTE — PROGRESS NOTE ADULT - PROBLEM SELECTOR PLAN 1
s/p mag drip  BP significantly better   advise to decrease nifedipine to 30 mg BID and labetalol to 200 mg BID  if BP in the 110s, recommend not to administer  night dose of nifedipine  meanwhile, will follow work up for 2nd HTN

## 2019-03-06 NOTE — CONSULT NOTE ADULT - ATTENDING COMMENTS
Pt seen on rounds this afternoon.  Is known to us from her admission for delivery.  This is now her second post-partum admission for uncontrolled hypertension.  In terms of her hypothyroidism, was apparently on 175 mcg/day prior to her pregnancy, but needed increase to 300 mcg during the pregnancy.  Although she says that her TFTs were normal on 175 mcg/day. she also had been on 225 mcg at some point, but was then decreased to 175 mcg/day due to elevated TFTs.  I have some question as to whether normal TFTs on the 175 mcg dose were verified, but the main factor in her fluctuating thyroxine requirements is likely her weight.  The elevated TFTs on the 225 mcg dose occurred after she had lost 80 lb in weight, so her requirements likely decreased at that point.  She is now back to her "baseline" weight of 280 (albeit with some peripheral edema), so it is likely that she needs closer to the 225 mcg dose once again.    With respect to her diabetes, her blood glucoses were generally below 150 at home, and are still mostly < 150 in the hospital without metformin.  Would continue on sliding scale coverage only.
PEC w several admissions post partum for severe range BPs (asymp), with improving proteinuria. 's sys at home but she didn't call because she felt otherwise well despite recommendations by OB service to do so. Questionable compliance. BP 200s sys on admission, s/p Mg, Nifedipine 60mg BID and LAbetalol 300mg TID throughout the day bps have been at goal. Edema improving. Reviewed prior records/imaging. Echo prior visit showed mild-mod LVH, suggestive of possible chronic HTN. Pt denies knowledge of prior HTN and no HTN during most of pregnancy. Asked OB resident to get records from her endocrinologist who has been seeing her regularly pre pregnancy to see BP trends. If sbp <110 pre pm NIfedipine dose, would decrease to 30mg for the night instead of 60  Secondary HTN w/u incl pau, pra, plasma metanephrine, cortisol, TSH, renal sono w arterial doppler to evaluate renal arteries.

## 2019-03-06 NOTE — PROGRESS NOTE ADULT - SUBJECTIVE AND OBJECTIVE BOX
Patient evaluated at bedside. No pain. No toxic complaints. Completing 24 hr urine collection. Undergoing work up for HTN and worsening hypothyroid.   Meeting postop milestones.    Physical Exam:  Vital Signs Last 24 Hrs  T(C): 36.7 (06 Mar 2019 08:10), Max: 36.9 (06 Mar 2019 06:00)  T(F): 98.1 (06 Mar 2019 08:10), Max: 98.5 (06 Mar 2019 06:00)  HR: 76 (06 Mar 2019 09:00) (62 - 107)  BP: 114/61 (06 Mar 2019 09:00) (92/59 - 134/56)  RR: 18 (06 Mar 2019 08:10) (17 - 18)  SpO2: 97% (06 Mar 2019 08:10) (96% - 98%)    GA: NAD, A+0 x 3  Abd: soft, nontender, nondistended, no rebound or guarding, incision clean, dry and intact, covered with abd, obese  : lochia WNL  Extremities: no swelling or calf tenderness                            11.1   12.81 )-----------( 351      ( 05 Mar 2019 07:42 )             36.3     03-05    143  |  105  |  9   ----------------------------<  130<H>  4.6   |  26  |  0.78    Ca    9.0      05 Mar 2019 07:42  Mg     5.7     03-05    TPro  6.9  /  Alb  3.5  /  TBili  0.2  /  DBili  x   /  AST  17  /  ALT  24  /  AlkPhos  55  03-05

## 2019-03-07 ENCOUNTER — TRANSCRIPTION ENCOUNTER (OUTPATIENT)
Age: 44
End: 2019-03-07

## 2019-03-07 VITALS
TEMPERATURE: 98 F | DIASTOLIC BLOOD PRESSURE: 75 MMHG | HEART RATE: 75 BPM | SYSTOLIC BLOOD PRESSURE: 135 MMHG | RESPIRATION RATE: 18 BRPM | OXYGEN SATURATION: 98 %

## 2019-03-07 LAB
GLUCOSE BLDC GLUCOMTR-MCNC: 106 MG/DL — HIGH (ref 70–99)
GLUCOSE BLDC GLUCOMTR-MCNC: 90 MG/DL — SIGNIFICANT CHANGE UP (ref 70–99)

## 2019-03-07 PROCEDURE — 82962 GLUCOSE BLOOD TEST: CPT

## 2019-03-07 PROCEDURE — 84156 ASSAY OF PROTEIN URINE: CPT

## 2019-03-07 PROCEDURE — 93005 ELECTROCARDIOGRAM TRACING: CPT

## 2019-03-07 PROCEDURE — 84550 ASSAY OF BLOOD/URIC ACID: CPT

## 2019-03-07 PROCEDURE — 83735 ASSAY OF MAGNESIUM: CPT

## 2019-03-07 PROCEDURE — 85027 COMPLETE CBC AUTOMATED: CPT

## 2019-03-07 PROCEDURE — 99291 CRITICAL CARE FIRST HOUR: CPT | Mod: 25

## 2019-03-07 PROCEDURE — 84439 ASSAY OF FREE THYROXINE: CPT

## 2019-03-07 PROCEDURE — 83615 LACTATE (LD) (LDH) ENZYME: CPT

## 2019-03-07 PROCEDURE — 82570 ASSAY OF URINE CREATININE: CPT

## 2019-03-07 PROCEDURE — 80053 COMPREHEN METABOLIC PANEL: CPT

## 2019-03-07 PROCEDURE — 78707 K FLOW/FUNCT IMAGE W/O DRUG: CPT

## 2019-03-07 PROCEDURE — 84436 ASSAY OF TOTAL THYROXINE: CPT

## 2019-03-07 PROCEDURE — 99232 SBSQ HOSP IP/OBS MODERATE 35: CPT

## 2019-03-07 PROCEDURE — 96374 THER/PROPH/DIAG INJ IV PUSH: CPT

## 2019-03-07 PROCEDURE — 83835 ASSAY OF METANEPHRINES: CPT

## 2019-03-07 PROCEDURE — 99232 SBSQ HOSP IP/OBS MODERATE 35: CPT | Mod: GC

## 2019-03-07 PROCEDURE — 84481 FREE ASSAY (FT-3): CPT

## 2019-03-07 PROCEDURE — 36415 COLL VENOUS BLD VENIPUNCTURE: CPT

## 2019-03-07 PROCEDURE — 99233 SBSQ HOSP IP/OBS HIGH 50: CPT

## 2019-03-07 PROCEDURE — 71045 X-RAY EXAM CHEST 1 VIEW: CPT

## 2019-03-07 PROCEDURE — 82384 ASSAY THREE CATECHOLAMINES: CPT

## 2019-03-07 PROCEDURE — A9562: CPT

## 2019-03-07 PROCEDURE — 84443 ASSAY THYROID STIM HORMONE: CPT

## 2019-03-07 RX ORDER — LABETALOL HCL 100 MG
200 TABLET ORAL EVERY 6 HOURS
Qty: 0 | Refills: 0 | Status: DISCONTINUED | OUTPATIENT
Start: 2019-03-07 | End: 2019-03-07

## 2019-03-07 RX ORDER — NIFEDIPINE 30 MG
1 TABLET, EXTENDED RELEASE 24 HR ORAL
Qty: 60 | Refills: 0 | OUTPATIENT
Start: 2019-03-07 | End: 2019-04-05

## 2019-03-07 RX ORDER — LABETALOL HCL 100 MG
300 TABLET ORAL EVERY 8 HOURS
Qty: 0 | Refills: 0 | Status: DISCONTINUED | OUTPATIENT
Start: 2019-03-07 | End: 2019-03-07

## 2019-03-07 RX ORDER — NIFEDIPINE 30 MG
30 TABLET, EXTENDED RELEASE 24 HR ORAL ONCE
Qty: 0 | Refills: 0 | Status: COMPLETED | OUTPATIENT
Start: 2019-03-07 | End: 2019-03-07

## 2019-03-07 RX ORDER — LEVOTHYROXINE SODIUM 125 MCG
2 TABLET ORAL
Qty: 60 | Refills: 0 | OUTPATIENT
Start: 2019-03-07 | End: 2019-04-05

## 2019-03-07 RX ORDER — LABETALOL HCL 100 MG
200 TABLET ORAL ONCE
Qty: 0 | Refills: 0 | Status: COMPLETED | OUTPATIENT
Start: 2019-03-07 | End: 2019-03-07

## 2019-03-07 RX ORDER — HYDRALAZINE HCL 50 MG
10 TABLET ORAL ONCE
Qty: 0 | Refills: 0 | Status: COMPLETED | OUTPATIENT
Start: 2019-03-07 | End: 2019-03-07

## 2019-03-07 RX ORDER — LABETALOL HCL 100 MG
400 TABLET ORAL EVERY 6 HOURS
Qty: 0 | Refills: 0 | Status: DISCONTINUED | OUTPATIENT
Start: 2019-03-07 | End: 2019-03-07

## 2019-03-07 RX ORDER — LABETALOL HCL 100 MG
1 TABLET ORAL
Qty: 120 | Refills: 2 | OUTPATIENT
Start: 2019-03-07 | End: 2019-06-04

## 2019-03-07 RX ORDER — NIFEDIPINE 30 MG
60 TABLET, EXTENDED RELEASE 24 HR ORAL DAILY
Qty: 0 | Refills: 0 | Status: DISCONTINUED | OUTPATIENT
Start: 2019-03-07 | End: 2019-03-07

## 2019-03-07 RX ORDER — LEVOTHYROXINE SODIUM 125 MCG
1 TABLET ORAL
Qty: 0 | Refills: 0 | COMMUNITY

## 2019-03-07 RX ADMIN — Medication 1 TABLET(S): at 12:22

## 2019-03-07 RX ADMIN — Medication 30 MILLIGRAM(S): at 08:34

## 2019-03-07 RX ADMIN — Medication 225 MICROGRAM(S): at 06:28

## 2019-03-07 RX ADMIN — Medication 400 MILLIGRAM(S): at 12:22

## 2019-03-07 RX ADMIN — Medication 200 MILLIGRAM(S): at 06:28

## 2019-03-07 RX ADMIN — Medication 100 MILLIGRAM(S): at 12:22

## 2019-03-07 RX ADMIN — Medication 10 MILLIGRAM(S): at 11:07

## 2019-03-07 RX ADMIN — Medication 200 MILLIGRAM(S): at 08:12

## 2019-03-07 RX ADMIN — Medication 30 MILLIGRAM(S): at 06:15

## 2019-03-07 NOTE — DISCHARGE NOTE OB - MEDICATION SUMMARY - MEDICATIONS TO CHANGE
I will SWITCH the dose or number of times a day I take the medications listed below when I get home from the hospital:    Nifedical XL 30 mg oral tablet, extended release  -- 1 tab(s) by mouth once a day MDD:MDD: 1 tablet  -- Avoid grapefruit and grapefruit juice while taking this medication.  It is very important that you take or use this exactly as directed.  Do not skip doses or discontinue unless directed by your doctor.  Some non-prescription drugs may aggravate your condition.  Read all labels carefully.  If a warning appears, check with your doctor before taking.  Swallow whole.  Do not crush.    labetalol 300 mg oral tablet  -- 1 tab(s) by mouth every 8 hours MDD:MDD: 3 tablets  -- It is very important that you take or use this exactly as directed.  Do not skip doses or discontinue unless directed by your doctor.  May cause drowsiness.  Alcohol may intensify this effect.  Use care when operating dangerous machinery.  Some non-prescription drugs may aggravate your condition.  Read all labels carefully.  If a warning appears, check with your doctor before taking. I will SWITCH the dose or number of times a day I take the medications listed below when I get home from the hospital:    Nifedical XL 30 mg oral tablet, extended release  -- 1 tab(s) by mouth once a day MDD:MDD: 1 tablet  -- Avoid grapefruit and grapefruit juice while taking this medication.  It is very important that you take or use this exactly as directed.  Do not skip doses or discontinue unless directed by your doctor.  Some non-prescription drugs may aggravate your condition.  Read all labels carefully.  If a warning appears, check with your doctor before taking.  Swallow whole.  Do not crush.    labetalol 300 mg oral tablet  -- 1 tab(s) by mouth every 8 hours MDD:MDD: 3 tablets  -- It is very important that you take or use this exactly as directed.  Do not skip doses or discontinue unless directed by your doctor.  May cause drowsiness.  Alcohol may intensify this effect.  Use care when operating dangerous machinery.  Some non-prescription drugs may aggravate your condition.  Read all labels carefully.  If a warning appears, check with your doctor before taking.    labetalol 100 mg oral tablet  -- 1 tab(s) by mouth 4 times a day MDD:MDD: 4 tablets per day  -- It is very important that you take or use this exactly as directed.  Do not skip doses or discontinue unless directed by your doctor.  May cause drowsiness.  Alcohol may intensify this effect.  Use care when operating dangerous machinery.  Some non-prescription drugs may aggravate your condition.  Read all labels carefully.  If a warning appears, check with your doctor before taking.    NIFEdipine 60 mg oral tablet, extended release  -- 1 tab(s) by mouth 2 times a day MDD:MDD: 2 tablets  -- Avoid grapefruit and grapefruit juice while taking this medication.  It is very important that you take or use this exactly as directed.  Do not skip doses or discontinue unless directed by your doctor.  Some non-prescription drugs may aggravate your condition.  Read all labels carefully.  If a warning appears, check with your doctor before taking.  Swallow whole.  Do not crush.

## 2019-03-07 NOTE — PROGRESS NOTE ADULT - PROBLEM SELECTOR PLAN 1
s/p mag drip  BP still not at goal  however, patient insists on being discharged  should be discharged on nifedipine 60 mg po BID  and labetalol 400 mg QID.  patient was instructed to take nifedipine and labetalol 1/2 tablet if SBP< 110.  She was instructed to call Dr. Mejia if BPs > 140 s/90s   She should follow up with Dr. Mejia on 03/11 @ 12 30 pm   low sodium diet    meanwhile, will follow work up for 2nd HTN

## 2019-03-07 NOTE — PROGRESS NOTE ADULT - SUBJECTIVE AND OBJECTIVE BOX
INTERVAL HPI/OVERNIGHT EVENTS:        Pt reports the following symptoms:    CONSTITUTIONAL:  Negative fever or chills, feels well, good appetite  EYES:  Negative  blurry vision or double vision  CARDIOVASCULAR:  Negative for chest pain or palpitations  RESPIRATORY:  Negative for cough, wheezing, or SOB   GASTROINTESTINAL:  Negative for nausea, vomiting, diarrhea, constipation, or abdominal pain  GENITOURINARY:  Negative frequency, urgency or dysuria  NEUROLOGIC:  No headache, confusion, dizziness, lightheadedness    MEDICATIONS  (STANDING):  dextrose 50% Injectable 25 Gram(s) IV Push once  insulin lispro (HumaLOG) corrective regimen sliding scale   SubCutaneous Before meals and at bedtime  labetalol 400 milliGRAM(s) Oral every 6 hours  levothyroxine 225 MICROGram(s) Oral daily  prenatal multivitamin 1 Tablet(s) Oral daily    MEDICATIONS  (PRN):  acetaminophen   Tablet .. 650 milliGRAM(s) Oral every 6 hours PRN Mild Pain (1 - 3)  docusate sodium 100 milliGRAM(s) Oral daily PRN Constipation      PHYSICAL EXAM  Vital Signs Last 24 Hrs  T(C): 36.9 (07 Mar 2019 14:00), Max: 37.1 (06 Mar 2019 22:08)  T(F): 98.4 (07 Mar 2019 14:00), Max: 98.7 (06 Mar 2019 22:08)  HR: 75 (07 Mar 2019 14:00) (67 - 80)  BP: 135/75 (07 Mar 2019 14:00) (133/79 - 176/95)  BP(mean): --  RR: 18 (07 Mar 2019 14:00) (17 - 18)  SpO2: 98% (07 Mar 2019 14:00) (97% - 100%)    Constitutional: wn/wd in NAD.   HEENT: NCAT, MMM, OP clear, EOMI, no proptosis or lid retraction  Neck: no thyromegaly or palpable thyroid nodules   Respiratory: lungs CTAB.  Cardiovascular: regular rhythm, normal S1 and S2, no audible murmurs, no peripheral edema  GI: soft, NT/ND, no masses/HSM appreciated.  Neurology: no tremors, DTR 2+  Skin: no visible rashes/lesions  Psychiatric: AAO x 3, normal affect/mood.    LABS:      Mg     5.7     03-05          Thyroid Stimulating Hormone, Serum: 35.051 uIU/mL (03-05 @ 07:42)      HbA1C: 6.7 % (02-24 @ 05:56)    CAPILLARY BLOOD GLUCOSE  POCT Blood Glucose.: 106 mg/dL (07 Mar 2019 11:04)  POCT Blood Glucose.: 90 mg/dL (07 Mar 2019 06:55)  POCT Blood Glucose.: 122 mg/dL (06 Mar 2019 22:04)  POCT Blood Glucose.: 141 mg/dL (06 Mar 2019 18:00) INTERVAL HPI/OVERNIGHT EVENTS:        Pt reports the following symptoms:  CONSTITUTIONAL:  Negative fever or chills, feels well, good appetite  EYES:  Negative  blurry vision or double vision  CARDIOVASCULAR:  Negative for chest pain or palpitations  RESPIRATORY:  Negative for cough, wheezing, or SOB   GASTROINTESTINAL:  Negative for nausea, vomiting, diarrhea, constipation, or abdominal pain  GENITOURINARY:  Negative frequency, urgency or dysuria  NEUROLOGIC:  No headache, confusion, dizziness, lightheadedness    MEDICATIONS  (STANDING):  dextrose 50% Injectable 25 Gram(s) IV Push once  insulin lispro (HumaLOG) corrective regimen sliding scale   SubCutaneous Before meals and at bedtime  labetalol 400 milliGRAM(s) Oral every 6 hours  levothyroxine 225 MICROGram(s) Oral daily  prenatal multivitamin 1 Tablet(s) Oral daily    MEDICATIONS  (PRN):  acetaminophen   Tablet .. 650 milliGRAM(s) Oral every 6 hours PRN Mild Pain (1 - 3)  docusate sodium 100 milliGRAM(s) Oral daily PRN Constipation      PHYSICAL EXAM  Vital Signs Last 24 Hrs  T(C): 36.9 (07 Mar 2019 14:00), Max: 37.1 (06 Mar 2019 22:08)  T(F): 98.4 (07 Mar 2019 14:00), Max: 98.7 (06 Mar 2019 22:08)  HR: 75 (07 Mar 2019 14:00) (67 - 80)  BP: 135/75 (07 Mar 2019 14:00) (133/79 - 176/95)  BP(mean): --  RR: 18 (07 Mar 2019 14:00) (17 - 18)  SpO2: 98% (07 Mar 2019 14:00) (97% - 100%)    Constitutional: wn/wd in NAD.   HEENT: NCAT, MMM, OP clear, EOMI, no proptosis or lid retraction  Neck: no thyromegaly or palpable thyroid nodules   Respiratory: lungs CTAB.  Cardiovascular: regular rhythm, normal S1 and S2, no audible murmurs, no peripheral edema  GI: soft, NT/ND, no masses/HSM appreciated.  Neurology: no tremors, DTR 2+  Skin: no visible rashes/lesions  Psychiatric: AAO x 3, normal affect/mood.    LABS:      Mg     5.7     03-05    Thyroid Stimulating Hormone, Serum: 35.051 uIU/mL (03- @ 07:42)      HbA1C: 6.7 % ( @ 05:56)    CAPILLARY BLOOD GLUCOSE  POCT Blood Glucose.: 106 mg/dL (07 Mar 2019 11:04)  POCT Blood Glucose.: 90 mg/dL (07 Mar 2019 06:55)  POCT Blood Glucose.: 122 mg/dL (06 Mar 2019 22:04)  POCT Blood Glucose.: 141 mg/dL (06 Mar 2019 18:00)    A/P: 43y pt with GD, hypothyroidism  POD #12 from primary  section on  presenting with persistent severe range blood pressures, with postpartum preeclampsia found to have TSH of 35.    1. Hypothyroidism--  Continue levothyroxine to 225 mcgs daily. Please send RX for levothyroxine 112mg (2 tabs once daily 1 hour before breakfast)  Will need repeat TFTs in 6 weeks.     2.  DM--gestational, controlled, uncomplicated  -Patient chooses not to restart metformin at this time.   Can hold and f/u as outpatient after review of bloods sugars.    Pt's fingerstick glucose goal is 100-150.    Pt can follow up at discharge with [facility] by calling  to make an appointment in 6 weeks.     Case d/w Dr. De La Paz, primary team updated.

## 2019-03-07 NOTE — PROGRESS NOTE ADULT - ATTENDING COMMENTS
Pt seen on rounds this afternoon prior to discharge.  Glucoses remain under adequate control off metformin, and with the pt reluctant to take it (plus having adequate glycemic control at home without the drug) I see no reason to push her to take it.  Discussed her previous weight loss, which was quite significant prior to her pregnancy.  Says that she accomplished this by "not eating what I shouldn't be  eating."  I think that she is motivated to get back on track in this regard  Should discharge on the 225 mcg of levothyroxine (can given this as two 112 mcg tablets)  Will see her at [address] [place] [address] in 5-6 weeks
Ms. Nunez has been seen and examined.  I agree with the assessment and plan as documented by Dr. Dillard.
Long discussion w patient, OB residents, labile bp's. Confirmed she likely has chronic HTN, BP values on 2 occasions in Aug 2018 140's/80s documented in [employer], consistent w her echo from last visit showing moderate LVH. No signs/sx of PEC flare at this time, off Mg.   Pt demanding to go home, explained at length the risks of going home w uncontrolled Bps including stroke, seizure and death. Will try to manage bp more aggressively to avoid severe range bps at home. Increased nifedipine to 60mg BID and Labetalol to 400mg QID, would prefer to have her stay in house until morning to see if this provides adequate control w/o hypotension experienced yesterday however pt refusing. To half doses if sbp <110, to check BP QID at home and call if bps 150s/90s so we can adjust regimen before she develops severe range. If she experiences any headache, neurological weakness, dizziness, chest pain, SOB, worsening LE edema, ab/ pain RUQ, visual sx, she knows to come straight to ER for evaluation. Will fu with me in office MOnday March 11th 12:30pm
easing back on antihypertensives for several BP readings sys 90s despite being off mg since last night

## 2019-03-07 NOTE — DISCHARGE NOTE OB - CARE PROVIDER_API CALL
Davina Salguero)  Obstetrics and Gynecology  [address], [facility] [place] [address]  Phone: [phone]  Fax: [phone]  Follow Up Time:

## 2019-03-07 NOTE — PROGRESS NOTE ADULT - REASON FOR ADMISSION
postpartum preeclampsia

## 2019-03-07 NOTE — PROGRESS NOTE ADULT - SUBJECTIVE AND OBJECTIVE BOX
alerted by nursing rising BP, most recently 167/72. At bedside patient reporting mild HA which responded nicely with tylenol. No other toxic complaints and otherwise feeling well. Patient has been ambulating    Repeat BP 15 min later was 154/84, patient is due for first antihypertensive medications at 6am, no pushes necessary at this time. Will continue to monitor BP q4hr, unless patient becomes symptomatic, will continue to monitor closely

## 2019-03-07 NOTE — DISCHARGE NOTE OB - HOSPITAL COURSE
Readmitted for severe BPs. IV Mg 24 hrs. BP controlled with meds. No other issues. Discharged home in stable condition. Seen by nephrology and endocrinology.

## 2019-03-07 NOTE — DISCHARGE NOTE OB - CARE PLAN
Principal Discharge DX:	Preeclampsia, severe  Goal:	healthy recovery!!!!!  Assessment and plan of treatment:	BLOOD PRESSURE MEDICATIONS:  1. Take Labetalol 400mg every 6 hours. (take one 300mg tablet plus one 100mg tablet at the same time)  Take this medication at 6am, noon, 6pm, and midnight.   2. Take Nifedipine 60mg every 12 hours. Take this medication at 10am and 10pm.  3. Take your blood pressure 3x per day  4. If your BP is >160/>110 (too high), call Dr. Mejia  5. If your BP is <110/<60 (too low), do not take your scheduled medication, repeat BP in 1 hour, and take the medication then if needed. If not, completely skip that dose. Regardless, you can take the rest of the medications as scheduled unless the BP stays too low. Call Dr. Mejia.  6. Go see Dr. Mejia on Monday 3/11/19 at 12:30pm. An appointment has been made for you. You can call the office to confirm but you don't have to. The office is located at the [facility], 5th floor, enter at [address] or [address] The number is [phone].  7. Go to the ER if you have severe headache, blurry vision, stabbing reflux, worsening swelling.  8. Take your synthroid 224mcg every day (take two tablets every morning, 112mcg each)  9. Take your fingersticks, bring log to endocrinologist.  10. Go see Dr. Mejia next week (Monday)  11. Go see Dr. Salguero in 2 weeks (the week of 3/18)  12. Go see endocrinology in 4 weeks (the week of 4/1)  13. Go see Dr. Salguero again in 6 weeks (the week of 4/15)

## 2019-03-07 NOTE — DISCHARGE NOTE OB - MEDICATION SUMMARY - MEDICATIONS TO TAKE
I will START or STAY ON the medications listed below when I get home from the hospital:    labetalol 100 mg oral tablet  -- 1 tab(s) by mouth 4 times a day MDD:MDD: 4 tablets per day  -- It is very important that you take or use this exactly as directed.  Do not skip doses or discontinue unless directed by your doctor.  May cause drowsiness.  Alcohol may intensify this effect.  Use care when operating dangerous machinery.  Some non-prescription drugs may aggravate your condition.  Read all labels carefully.  If a warning appears, check with your doctor before taking.    -- Indication: For Take 400mg labetalol every 6 hours for hypertension    NIFEdipine 60 mg oral tablet, extended release  -- 1 tab(s) by mouth 2 times a day MDD:MDD: 2 tablets  -- Avoid grapefruit and grapefruit juice while taking this medication.  It is very important that you take or use this exactly as directed.  Do not skip doses or discontinue unless directed by your doctor.  Some non-prescription drugs may aggravate your condition.  Read all labels carefully.  If a warning appears, check with your doctor before taking.  Swallow whole.  Do not crush.    -- Indication: For Take 60mg nifedipine every 12 hours I will START or STAY ON the medications listed below when I get home from the hospital:    labetalol 100 mg oral tablet  -- 1 tab(s) by mouth 4 times a day MDD:MDD: 4 tablets per day  -- It is very important that you take or use this exactly as directed.  Do not skip doses or discontinue unless directed by your doctor.  May cause drowsiness.  Alcohol may intensify this effect.  Use care when operating dangerous machinery.  Some non-prescription drugs may aggravate your condition.  Read all labels carefully.  If a warning appears, check with your doctor before taking.    -- Indication: For Take 400mg labetalol every 6 hours for hypertension    NIFEdipine 60 mg oral tablet, extended release  -- 1 tab(s) by mouth 2 times a day MDD:MDD: 2 tablets  -- Avoid grapefruit and grapefruit juice while taking this medication.  It is very important that you take or use this exactly as directed.  Do not skip doses or discontinue unless directed by your doctor.  Some non-prescription drugs may aggravate your condition.  Read all labels carefully.  If a warning appears, check with your doctor before taking.  Swallow whole.  Do not crush.    -- Indication: For Take 60mg nifedipine every 12 hours     levothyroxine 112 mcg (0.112 mg) oral capsule  -- 2 cap(s) by mouth once a day MDD:MDD: 2 tablets  -- Check with your doctor before becoming pregnant.  Obtain medical advice before taking any non-prescription drugs as some may affect the action of this medication.  Take medication on an empty stomach 1 hour before or 2 to 3 hours after a meal unless otherwise directed by your doctor.    -- Indication: For HYPothyroidism

## 2019-03-07 NOTE — PROGRESS NOTE ADULT - SUBJECTIVE AND OBJECTIVE BOX
Patient evaluated at bedside. She had one sustained severe BP and received 10mg IVP hydralazine. She then was on her new regimen of labetalol 400mg QID and nifedipine 60mg BID. No toxic complaints. The patient insists she must go home today due to childcare. Rather than discharge the patient AMA, she was counseled extensively on the risks of leaving with elevated BPs especially in the severe range such as seizure or stroke or worse. Symptoms of PEC were reviewed. The patient has her meds and a thorough review took place with her physician how these meds should be taken. She has improved significantly. Likely this is a component of HTN rather than PEC (no toxic complaints).      FS under controlled. Per endocrinology, does not need to take metformin. Stay on synthroid.     Physical Exam:  Vital Signs Last 24 Hrs  T(C): 36.9 (07 Mar 2019 14:00), Max: 37.1 (06 Mar 2019 22:08)  T(F): 98.4 (07 Mar 2019 14:00), Max: 98.7 (06 Mar 2019 22:08)  HR: 75 (07 Mar 2019 14:00) (67 - 80)  BP: 135/75 (07 Mar 2019 14:00) (133/79 - 176/95)  RR: 18 (07 Mar 2019 14:00) (17 - 18)  SpO2: 98% (07 Mar 2019 14:00) (97% - 100%)    GA: NAD, A+0 x 3  Abd:  soft, nontender, nondistended, no rebound or guarding, incision clean, dry and intact, uterus firm at midline, wound is clean and intact  Extremities: significantly less swelling, no calf tenderness          Mg     5.7     03-05

## 2019-03-07 NOTE — PROGRESS NOTE ADULT - ASSESSMENT
see above.     after extensive counseling, plan to discharge home with close outpatient follow up.   To see nephrology Monday; they will follow up on the 24 hr labs.

## 2019-03-07 NOTE — PROGRESS NOTE ADULT - SUBJECTIVE AND OBJECTIVE BOX
Patient is a 43y Female seen and evaluated at bedside.   pt seen and examined  Bp reading since last night > 150s, 160 s and 170s  2nd work up for HTn pending  pt insisting on leaving despite uncontrolled BP     acetaminophen   Tablet .. 650 every 6 hours PRN  dextrose 50% Injectable 25 once  docusate sodium 100 daily PRN  insulin lispro (HumaLOG) corrective regimen sliding scale  Before meals and at bedtime  labetalol 400 every 6 hours  levothyroxine 225 daily  prenatal multivitamin 1 daily      Allergies    No Known Allergies    Intolerances        T(C): , Max: 37.1 (03-06-19 @ 22:08)  T(F): , Max: 98.7 (03-06-19 @ 22:08)  HR: 75 (03-07-19 @ 14:00)  BP: 135/75 (03-07-19 @ 14:00)  BP(mean): --  RR: 18 (03-07-19 @ 14:00)  SpO2: 98% (03-07-19 @ 14:00)  Wt(kg): --        Review of Systems:  CONSTITUTIONAL: No fever or chills, No fatigue or tiredness.  EYES: No blurred or double vision.  RESPIRATORY: No shortness of breath, cough, hemoptysis  CARDIOVASCULAR: No Chest pain or shortness of breath  GASTROINTESTINAL: NO abdominal or flank pain, No nausea or vomiting, No diarrhea  GENITOURINARY: No dysuria or urinary burning, No difficulty passing urine, No hematuria  NEUROLOGICAL: No headaches or blurred vision  SKIN: No skin rashes   MUSCULOSKELETAL: No arthralgia, Joint pain, leg edema, No muscle pains      PHYSICAL EXAM:  GENERAL: NAD, obese  HEAD:  Atraumatic, Normocephalic,   EYES: Bilateral conjucniva and sclera normal,  Oral cavity: Oral mucosa dry and pink  NECK: Neck supple, No JVD  CHEST/LUNG: Clear to auscultation bilaterally; No wheeze, no rales, no crepitations  HEART: Regular rate and rhythm. JANENE II/VI at LPSB, No gallop, no rub   ABDOMEN: Soft, Nontender, BS+nt, No flank tenderness.   EXTREMITIES: No clubbing, cyanosis, or +2 pitting edema   Neurology: AAOx3, no focal neurological deficit  SKIN: No rashes or lesions          LABS:      Mg     5.7     03-05                  RADIOLOGY & ADDITIONAL STUDIES:

## 2019-03-07 NOTE — DISCHARGE NOTE OB - PLAN OF CARE
healthy recovery!!!!! BLOOD PRESSURE MEDICATIONS:  1. Take Labetalol 400mg every 6 hours. (take one 300mg tablet plus one 100mg tablet at the same time)  Take this medication at 6am, noon, 6pm, and midnight.   2. Take Nifedipine 60mg every 12 hours. Take this medication at 10am and 10pm.  3. Take your blood pressure 3x per day  4. If your BP is >160/>110 (too high), call Dr. Mejia  5. If your BP is <110/<60 (too low), do not take your scheduled medication, repeat BP in 1 hour, and take the medication then if needed. If not, completely skip that dose. Regardless, you can take the rest of the medications as scheduled unless the BP stays too low. Call Dr. Mejia.  6. Go see Dr. Mejia on Monday 3/11/19 at 12:30pm. An appointment has been made for you. You can call the office to confirm but you don't have to. The office is located at the [facility], 5th floor, enter at [address] or [address] The number is [phone].  7. Go to the ER if you have severe headache, blurry vision, stabbing reflux, worsening swelling.  8. Take your synthroid 224mcg every day (take two tablets every morning, 112mcg each)  9. Take your fingersticks, bring log to endocrinologist.  10. Go see Dr. Mejia next week (Monday)  11. Go see Dr. Salguero in 2 weeks (the week of 3/18)  12. Go see endocrinology in 4 weeks (the week of 4/1)  13. Go see Dr. Salguero again in 6 weeks (the week of 4/15)

## 2019-03-07 NOTE — DISCHARGE NOTE OB - MEDICATION SUMMARY - MEDICATIONS TO STOP TAKING
I will STOP taking the medications listed below when I get home from the hospital:  None I will STOP taking the medications listed below when I get home from the hospital:    Synthroid 175 mcg (0.175 mg) oral tablet  -- 1 tab(s) by mouth once a day    Prenatal 1 oral capsule    acetaminophen 325 mg oral tablet  -- 2 tab(s) by mouth every 6 hours, As needed, Moderate Pain (4 - 6)

## 2019-03-07 NOTE — PROGRESS NOTE ADULT - ASSESSMENT
SEE ABOVE FOR PLAN REGARDING BP      PER ENDOCRINE  - CONTINUE THIS SYNTHROID DOSE 225mcg  - METFORMIN 500mg BID  - FOLLOW UP WITH ENDOCRINE IN 4 WEEKS

## 2019-03-07 NOTE — PROGRESS NOTE ADULT - SUBJECTIVE AND OBJECTIVE BOX
PATIENT SEEN AT 8AM    Patient evaluated at bedside. Her BPs are in the severe range. The medications were lowered yesterday. She has no toxic  She denies headache, dizziness, chest pain, palpitations, shortness of breath, nausea, vomiting or heavy vaginal bleeding.  She has been ambulating without assistance, voiding spontaneously, passing gas, tolerating regular diet.    THIS AM, THE PATIENT RECEIVED 200MG PO LABETALOL AND 30MG XL PO NIFEDIPINE. THEN, WITH A SEVERE BP SHE RECEIVED ANOTHER 200MG PO LABETALOL ORAL. THE BP CAME DOWN TO THE MILD RANGE. SHE THEN RECEIVED ANOTHER 30MG XL NIFEDIPINE.    THE BP WAS RECHECKED 1.5 HOURS LATER. IT IS NOW IN THE SEVERE RANGE. PER NEPHROLOGY WILL REPEAT BP IN 15 MIN AND IF SEVERE WILL PUSH MEDS. THEN INCREASE LABETALOL DOSE TO 400MG 4X/DAY.    Physical Exam:  Vital Signs Last 24 Hrs  T(C): 36.9 (07 Mar 2019 08:05), Max: 37.2 (06 Mar 2019 13:04)  T(F): 98.4 (07 Mar 2019 08:05), Max: 98.9 (06 Mar 2019 13:04)  HR: 67 (07 Mar 2019 08:05) (67 - 80)  BP: 170/95 (07 Mar 2019 10:30) (133/79 - 176/95)  RR: 17 (07 Mar 2019 08:05) (17 - 18)  SpO2: 100% (07 Mar 2019 08:05) (96% - 100%)    GA: NAD, A+0 x 3  Abd: soft, nontender, nondistended, no rebound or guarding, incision clean, dry and intact, uterus firm at midline  : lochia WNL  Extremities: 1+ swelling or calf tenderness          Mg     5.7     03-05 PATIENT SEEN AT 8AM    Patient evaluated at bedside. Her BPs are in the severe range. The medications were lowered yesterday. She has no toxic complaints.   She denies headache, dizziness, chest pain, palpitations, shortness of breath, nausea, vomiting or heavy vaginal bleeding.  She has been ambulating without assistance, voiding spontaneously, passing gas, tolerating regular diet.    THIS AM, THE PATIENT RECEIVED 200MG PO LABETALOL AND 30MG XL PO NIFEDIPINE. THEN, WITH A SEVERE BP SHE RECEIVED ANOTHER 200MG PO LABETALOL ORAL. THE BP CAME DOWN TO THE MILD RANGE. SHE THEN RECEIVED ANOTHER 30MG XL NIFEDIPINE.    THE BP WAS RECHECKED 1.5 HOURS LATER. IT IS NOW IN THE SEVERE RANGE. PER NEPHROLOGY WILL REPEAT BP IN 15 MIN AND IF SEVERE WILL PUSH MEDS. THEN INCREASE LABETALOL DOSE TO 400MG 4X/DAY.    Physical Exam:  Vital Signs Last 24 Hrs  T(C): 36.9 (07 Mar 2019 08:05), Max: 37.2 (06 Mar 2019 13:04)  T(F): 98.4 (07 Mar 2019 08:05), Max: 98.9 (06 Mar 2019 13:04)  HR: 67 (07 Mar 2019 08:05) (67 - 80)  BP: 170/95 (07 Mar 2019 10:30) (133/79 - 176/95)  RR: 17 (07 Mar 2019 08:05) (17 - 18)  SpO2: 100% (07 Mar 2019 08:05) (96% - 100%)    GA: NAD, A+0 x 3  Abd: soft, nontender, nondistended, no rebound or guarding, incision clean, dry and intact, uterus firm at midline  : lochia WNL  Extremities: 1+ swelling or calf tenderness          Mg     5.7     03-05

## 2019-03-07 NOTE — DISCHARGE NOTE OB - PATIENT PORTAL LINK FT
You can access the [facility] Patient Portal, offered by [facility], by registering with the following website: [email or web address]

## 2019-03-09 LAB
GP B STREP DNA SPEC QL NAA+PROBE: NORMAL
GP B STREP DNA SPEC QL NAA+PROBE: NOT DETECTED
METANEPH UR-MCNC: SIGNIFICANT CHANGE UP
SOURCE GBS: NORMAL

## 2019-03-10 LAB
DOPAMINE - URINE 24 HOUR: 285 UG/24 HR — SIGNIFICANT CHANGE UP (ref 0–510)
DOPAMINE UR-MCNC: 95 UG/L — SIGNIFICANT CHANGE UP
EPINEPH UR-MCNC: 2 UG/L — SIGNIFICANT CHANGE UP
EPINEPHRINE - URINE, 24 HOUR: 6 UG/24 HR — SIGNIFICANT CHANGE UP (ref 0–20)
NOREPINEPH UR-MCNC: 54 UG/L — SIGNIFICANT CHANGE UP
NOREPINEPHRINE - URINE, 24 HOUR: 162 UG/24 HR — HIGH (ref 0–135)

## 2019-03-11 ENCOUNTER — APPOINTMENT (OUTPATIENT)
Dept: NEPHROLOGY | Facility: CLINIC | Age: 44
End: 2019-03-11
Payer: COMMERCIAL

## 2019-03-11 VITALS — HEART RATE: 70 BPM | SYSTOLIC BLOOD PRESSURE: 111 MMHG | OXYGEN SATURATION: 97 % | DIASTOLIC BLOOD PRESSURE: 65 MMHG

## 2019-03-11 VITALS — DIASTOLIC BLOOD PRESSURE: 69 MMHG | SYSTOLIC BLOOD PRESSURE: 115 MMHG

## 2019-03-11 DIAGNOSIS — E03.9 HYPOTHYROIDISM, UNSPECIFIED: ICD-10-CM

## 2019-03-11 PROCEDURE — 99244 OFF/OP CNSLTJ NEW/EST MOD 40: CPT

## 2019-03-12 LAB
ALBUMIN SERPL ELPH-MCNC: 4.1 G/DL
ALP BLD-CCNC: 56 U/L
ALT SERPL-CCNC: 19 U/L
ANION GAP SERPL CALC-SCNC: 13 MMOL/L
APPEARANCE: CLEAR
AST SERPL-CCNC: 15 U/L
BACTERIA: NEGATIVE
BASOPHILS # BLD AUTO: 0.09 K/UL
BASOPHILS NFR BLD AUTO: 0.8 %
BILIRUB SERPL-MCNC: 0.2 MG/DL
BILIRUBIN URINE: NEGATIVE
BLOOD URINE: ABNORMAL
BUN SERPL-MCNC: 14 MG/DL
CALCIUM SERPL-MCNC: 9.4 MG/DL
CHLORIDE SERPL-SCNC: 103 MMOL/L
CO2 SERPL-SCNC: 25 MMOL/L
COLOR: YELLOW
CORTIS 24H UR-MRATE: 48 MCG/24 H — HIGH (ref 3.5–45)
CORTIS UR-MCNC: 24 H — SIGNIFICANT CHANGE UP
CORTIS UR-MCNC: 3000 ML — SIGNIFICANT CHANGE UP
CREAT SERPL-MCNC: 1 MG/DL
CREAT SPEC-SCNC: 124 MG/DL
CREAT SPEC-SCNC: 126 MG/DL
CREAT/PROT UR: 0.1 RATIO
EOSINOPHIL # BLD AUTO: 0.25 K/UL
EOSINOPHIL NFR BLD AUTO: 2.2 %
GLUCOSE QUALITATIVE U: NEGATIVE
GLUCOSE SERPL-MCNC: 99 MG/DL
HCT VFR BLD CALC: 37 %
HGB BLD-MCNC: 11 G/DL
HYALINE CASTS: 0 /LPF
IMM GRANULOCYTES NFR BLD AUTO: 0.3 %
KETONES URINE: NEGATIVE
LDH SERPL-CCNC: 244 U/L
LEUKOCYTE ESTERASE URINE: ABNORMAL
LYMPHOCYTES # BLD AUTO: 3 K/UL
LYMPHOCYTES NFR BLD AUTO: 26.6 %
MAN DIFF?: NORMAL
MCHC RBC-ENTMCNC: 27.9 PG
MCHC RBC-ENTMCNC: 29.7 GM/DL
MCV RBC AUTO: 93.9 FL
METANEPHRINE, PL: <10 PG/ML — SIGNIFICANT CHANGE UP (ref 0–62)
MICROALBUMIN 24H UR DL<=1MG/L-MCNC: 3 MG/DL
MICROALBUMIN/CREAT 24H UR-RTO: 24 MG/G
MICROSCOPIC-UA: NORMAL
MONOCYTES # BLD AUTO: 1.08 K/UL
MONOCYTES NFR BLD AUTO: 9.6 %
NEUTROPHILS # BLD AUTO: 6.81 K/UL
NEUTROPHILS NFR BLD AUTO: 60.5 %
NITRITE URINE: NEGATIVE
NORMETANEPHRINE, PL: 10 PG/ML — SIGNIFICANT CHANGE UP (ref 0–145)
PH URINE: 6
PLATELET # BLD AUTO: 376 K/UL
POTASSIUM SERPL-SCNC: 5 MMOL/L
PROT SERPL-MCNC: 7.1 G/DL
PROT UR-MCNC: 15 MG/DL
PROTEIN URINE: NORMAL
RBC # BLD: 3.94 M/UL
RBC # FLD: 14.8 %
RED BLOOD CELLS URINE: 2 /HPF
SODIUM SERPL-SCNC: 141 MMOL/L
SPECIFIC GRAVITY URINE: 1.02
SQUAMOUS EPITHELIAL CELLS: 3 /HPF
URATE SERPL-MCNC: 6.2 MG/DL
UROBILINOGEN URINE: NORMAL
WBC # FLD AUTO: 11.26 K/UL
WHITE BLOOD CELLS URINE: 28 /HPF

## 2019-03-18 NOTE — HISTORY OF PRESENT ILLNESS
[FreeTextEntry1] : 42yo F  w suspected longstanding chronic HTN now s/p delivery c/b superimposed severe PEC requiring readmission for uncontrolled BP and Mg drip, referred by OB Dr. Davina Salguero to establish care:\par \par Old PCP Dr. Sharyn PETERSON on 34 and \par Endocrine - Dr Ej De La Paz\par \par More compliant w checking BP, First few days post hospitalization she says her sys were 150's, last few days 130-140. Doesn't remember diastolic \par \par She talked to her old PCP who saw her regularly for last 6yrs pre pregnancy, was told her bps were always "normal" but dr is no longer w practice and unable to verify the exact bp values.\par No LE edema\par No headache, dizziness, weakness, visual sx. \par No urinary sx. \par Today is the first day she was walking around outside the house, some discomfort along C section site. \par \par

## 2019-03-18 NOTE — PHYSICAL EXAM
[General Appearance - Alert] : alert [General Appearance - In No Acute Distress] : in no acute distress [Sclera] : the sclera and conjunctiva were normal [PERRL With Normal Accommodation] : pupils were equal in size, round, and reactive to light [Extraocular Movements] : extraocular movements were intact [Outer Ear] : the ears and nose were normal in appearance [Oropharynx] : the oropharynx was normal [Neck Appearance] : the appearance of the neck was normal [Jugular Venous Distention Increased] : there was no jugular-venous distention [Auscultation Breath Sounds / Voice Sounds] : lungs were clear to auscultation bilaterally [Heart Rate And Rhythm] : heart rate was normal and rhythm regular [Heart Sounds] : normal S1 and S2 [Full Pulse] : the pedal pulses are present [FreeTextEntry1] : trace edema up to lower shins, per pt similar to pre-pregnancy [Bowel Sounds] : normal bowel sounds [Abdomen Soft] : soft [Abdomen Tenderness] : non-tender [No CVA Tenderness] : no ~M costovertebral angle tenderness [Abnormal Walk] : normal gait [Involuntary Movements] : no involuntary movements were seen [Skin Color & Pigmentation] : normal skin color and pigmentation [Skin Turgor] : normal skin turgor [Cranial Nerves] : cranial nerves 2-12 were intact [] : no rash [No Focal Deficits] : no focal deficits [Impaired Insight] : insight and judgment were intact [Oriented To Time, Place, And Person] : oriented to person, place, and time [Affect] : the affect was normal

## 2019-03-18 NOTE — CONSULT LETTER
[Dear  ___] : Dear  [unfilled], [Consult Letter:] : I had the pleasure of evaluating your patient, [unfilled]. [Please see my note below.] : Please see my note below. [Consult Closing:] : Thank you very much for allowing me to participate in the care of this patient.  If you have any questions, please do not hesitate to contact me. [Sincerely,] : Sincerely, [FreeTextEntry3] : Darshana Mejia MD\par  of Medicine\par Division of Kidney Diseases and Hypertension\par [facility] \par [employer] [facility]\par  [DrPk  ___] : Dr. WASHBURN [DrPk ___] : Dr. WASHBURN

## 2019-03-18 NOTE — ASSESSMENT
[FreeTextEntry1] : 44yo F  w suspected longstanding chronic HTN now s/p delivery c/b superimposed severe PEC requiring readmission for uncontrolled BP and Mg drip, referred by OB Dr. Davina Salguero to establish care:\par \par BP well controlled in office, fair control w/o severe range bps at home since hospitalization. LE edema may party be 2/2 nifedipine - advised to 1/2 dose to 30mg BID if bps remain <120 sys, to 1/2 labetalol dose to 200mg if sbp<110, to continue checking BP before her labetalol doses. Advised low Na, exercise when tolerated after incision heals. Mild LVH on inpt echo suggestive of chronicity, as well as documented bps from her in first trimester that were 140's sys. Seen by endocrine for uncontrolled hypothyroidism as well, levothyroxine increased. Hypothyroid can contribute to htn. DMII insulin dependent, discussed importance of glycemic control. \par Reviewed inpt labs, proteinuria improved to 0.3g from 1.8g, PEC labs wnl, secondary w/u for hypertension incl plasma metanephrines, 24hr cortisol were wnl. No pau/PRA sent - will send next visit although low suspicion, has not been hypokalemic.

## 2019-03-26 ENCOUNTER — APPOINTMENT (OUTPATIENT)
Dept: OBGYN | Facility: CLINIC | Age: 44
End: 2019-03-26
Payer: COMMERCIAL

## 2019-03-26 VITALS
BODY MASS INDEX: 43.02 KG/M2 | SYSTOLIC BLOOD PRESSURE: 122 MMHG | HEIGHT: 64 IN | WEIGHT: 252 LBS | DIASTOLIC BLOOD PRESSURE: 85 MMHG | TEMPERATURE: 98.7 F

## 2019-03-26 PROBLEM — Z00.00 ENCOUNTER FOR PREVENTIVE HEALTH EXAMINATION: Noted: 2019-03-26

## 2019-03-26 PROCEDURE — 0503F POSTPARTUM CARE VISIT: CPT

## 2019-04-05 ENCOUNTER — APPOINTMENT (OUTPATIENT)
Dept: OBGYN | Facility: CLINIC | Age: 44
End: 2019-04-05
Payer: COMMERCIAL

## 2019-04-05 VITALS
WEIGHT: 248 LBS | HEIGHT: 64 IN | DIASTOLIC BLOOD PRESSURE: 70 MMHG | SYSTOLIC BLOOD PRESSURE: 110 MMHG | BODY MASS INDEX: 42.34 KG/M2

## 2019-04-05 PROCEDURE — 0503F POSTPARTUM CARE VISIT: CPT

## 2019-04-05 NOTE — HISTORY OF PRESENT ILLNESS
[Postpartum Follow Up] : postpartum follow up [Delivery Date: ___] : on [unfilled] [Female] : Delivery History: baby girl [Breastfeeding] : not currently nursing [Doing Well] : is doing well [FreeTextEntry8] : BP check [FreeTextEntry9] : severe PEC [de-identified] : BP wnl [de-identified] : continue to check BP at home, rto 1 week

## 2019-04-25 NOTE — HISTORY OF PRESENT ILLNESS
[unknown] : the patient is unsure of the date of her LMP [Normal Amount/Duration] : was of a normal amount and duration [Regular Cycle Intervals] : periods have been regular [Menarche Age: ____] : age at menarche was [unfilled] [Menstrual Cramps] : menstrual cramps [Sexually Active] : is sexually active [Male ___] : [unfilled] male [Spotting Between  Menses] : no spotting between menses [Currently In Menopause] : not currently in menopause [Experiencing Menopausal Sxs] : not experiencing menopausal symptoms

## 2019-04-25 NOTE — CHIEF COMPLAINT
[Follow Up] : follow up GYN visit [FreeTextEntry1] : PT PRESENT FOR GYN FOLLOW-UP. PT STATES SHE HAVING PAIN IN HER LOWER PART OF HER STOMACH. AND HER FEET ARE STILL SWOLLEN.

## 2019-04-25 NOTE — PHYSICAL EXAM
[No Bleeding] : there was no active vaginal bleeding [Normal] : uterus [Uterine Adnexae] : were not tender and not enlarged

## 2019-04-30 ENCOUNTER — APPOINTMENT (OUTPATIENT)
Dept: OBGYN | Facility: CLINIC | Age: 44
End: 2019-04-30
Payer: COMMERCIAL

## 2019-04-30 ENCOUNTER — APPOINTMENT (OUTPATIENT)
Dept: OBGYN | Facility: CLINIC | Age: 44
End: 2019-04-30

## 2019-04-30 VITALS
BODY MASS INDEX: 42.43 KG/M2 | DIASTOLIC BLOOD PRESSURE: 75 MMHG | SYSTOLIC BLOOD PRESSURE: 120 MMHG | HEIGHT: 64 IN | WEIGHT: 248.5 LBS

## 2019-04-30 VITALS
WEIGHT: 249 LBS | DIASTOLIC BLOOD PRESSURE: 72 MMHG | HEIGHT: 64 IN | BODY MASS INDEX: 42.51 KG/M2 | SYSTOLIC BLOOD PRESSURE: 120 MMHG

## 2019-04-30 DIAGNOSIS — Z87.59 PERSONAL HISTORY OF OTHER COMPLICATIONS OF PREGNANCY, CHILDBIRTH AND THE PUERPERIUM: ICD-10-CM

## 2019-04-30 DIAGNOSIS — Z86.39 PERSONAL HISTORY OF OTHER ENDOCRINE, NUTRITIONAL AND METABOLIC DISEASE: ICD-10-CM

## 2019-04-30 DIAGNOSIS — Z48.89 ENCOUNTER FOR OTHER SPECIFIED SURGICAL AFTERCARE: ICD-10-CM

## 2019-04-30 PROCEDURE — 0503F POSTPARTUM CARE VISIT: CPT

## 2019-05-15 ENCOUNTER — APPOINTMENT (OUTPATIENT)
Dept: ENDOCRINOLOGY | Facility: CLINIC | Age: 44
End: 2019-05-15

## 2019-05-23 ENCOUNTER — APPOINTMENT (OUTPATIENT)
Dept: NEPHROLOGY | Facility: CLINIC | Age: 44
End: 2019-05-23
Payer: COMMERCIAL

## 2019-05-23 VITALS — HEART RATE: 75 BPM | SYSTOLIC BLOOD PRESSURE: 130 MMHG | RESPIRATION RATE: 14 BRPM | DIASTOLIC BLOOD PRESSURE: 78 MMHG

## 2019-05-23 DIAGNOSIS — I51.7 CARDIOMEGALY: ICD-10-CM

## 2019-05-23 DIAGNOSIS — R60.0 LOCALIZED EDEMA: ICD-10-CM

## 2019-05-23 PROBLEM — Z86.39 HISTORY OF OBESITY: Status: RESOLVED | Noted: 2019-05-23 | Resolved: 2019-05-23

## 2019-05-23 PROBLEM — Z48.89 AFTERCARE FOLLOWING SURGERY: Status: ACTIVE | Noted: 2019-04-25

## 2019-05-23 PROBLEM — Z86.39 HISTORY OF HYPOTHYROIDISM: Status: RESOLVED | Noted: 2019-05-23 | Resolved: 2019-05-23

## 2019-05-23 PROBLEM — Z87.59 HISTORY OF SEVERE PRE-ECLAMPSIA: Status: RESOLVED | Noted: 2019-05-23 | Resolved: 2019-05-23

## 2019-05-23 PROCEDURE — 99214 OFFICE O/P EST MOD 30 MIN: CPT

## 2019-05-23 RX ORDER — INSULIN LISPRO 100 [IU]/ML
100 INJECTION, SOLUTION INTRAVENOUS; SUBCUTANEOUS
Qty: 8 | Refills: 5 | Status: DISCONTINUED | COMMUNITY
Start: 2018-09-18 | End: 2019-05-23

## 2019-05-23 RX ORDER — PEN NEEDLE, DIABETIC 29 G X1/2"
32G X 4 MM NEEDLE, DISPOSABLE MISCELLANEOUS
Qty: 1 | Refills: 5 | Status: DISCONTINUED | COMMUNITY
Start: 2018-09-18 | End: 2019-05-23

## 2019-05-23 RX ORDER — INSULIN DETEMIR 100 [IU]/ML
100 INJECTION, SOLUTION SUBCUTANEOUS
Qty: 10 | Refills: 5 | Status: DISCONTINUED | COMMUNITY
Start: 2018-09-18 | End: 2019-05-23

## 2019-05-23 NOTE — HISTORY OF PRESENT ILLNESS
[Definite:  ___ (Date)] : the last menstrual period was [unfilled] [Normal Amount/Duration] : was of a normal amount and duration [Spotting Between  Menses] : spotting reported between menses [Regular Cycle Intervals] : periods have been regular [Menarche Age: ____] : age at menarche was [unfilled] [Menstrual Cramps] : menstrual cramps [Sexually Active] : is sexually active [Male ___] : [unfilled] male [Currently In Menopause] : not currently in menopause [Experiencing Menopausal Sxs] : not experiencing menopausal symptoms

## 2019-05-23 NOTE — CHIEF COMPLAINT
[Follow Up] : follow up GYN visit [FreeTextEntry1] : patient present for gyn follow- up. pt has no complaints.

## 2019-05-28 NOTE — HISTORY OF PRESENT ILLNESS
[FreeTextEntry1] : 45yo obese white F  w suspected longstanding chronic HTN s/p term Csection c/b superimposed severe PEC requiring readmission for uncontrolled BP and Mg  here for f/u:\par \par OB Dr. Davina Salguero \par Endocrine - Dr Ej De La Paz\par \par Being going weekly to her OB, her nifedipine was decreased to 30mg daily and 100mg BID Labetalol. \par 110s/80s. Highest 118/80s. (low 80s)\par No dizziness/light headedness.\par \par Lost "a lot" of weight, doesn't know how much but no clothes fit her. \par Healthy happy baby girl 3 months old now. \par \par Had severe menses w pain and a lot of bleeding this week for first time. No associated edema or rise in BP.\par \par All other ROS negative.\par \par

## 2019-05-28 NOTE — PHYSICAL EXAM
[General Appearance - Alert] : alert [General Appearance - In No Acute Distress] : in no acute distress [Sclera] : the sclera and conjunctiva were normal [PERRL With Normal Accommodation] : pupils were equal in size, round, and reactive to light [Extraocular Movements] : extraocular movements were intact [Outer Ear] : the ears and nose were normal in appearance [Oropharynx] : the oropharynx was normal [Neck Appearance] : the appearance of the neck was normal [Jugular Venous Distention Increased] : there was no jugular-venous distention [Auscultation Breath Sounds / Voice Sounds] : lungs were clear to auscultation bilaterally [Heart Rate And Rhythm] : heart rate was normal and rhythm regular [Heart Sounds] : normal S1 and S2 [Full Pulse] : the pedal pulses are present [Bowel Sounds] : normal bowel sounds [Abdomen Soft] : soft [Abdomen Tenderness] : non-tender [No CVA Tenderness] : no ~M costovertebral angle tenderness [Abnormal Walk] : normal gait [Involuntary Movements] : no involuntary movements were seen [Skin Color & Pigmentation] : normal skin color and pigmentation [Skin Turgor] : normal skin turgor [] : no rash [Cranial Nerves] : cranial nerves 2-12 were intact [No Focal Deficits] : no focal deficits [Oriented To Time, Place, And Person] : oriented to person, place, and time [Impaired Insight] : insight and judgment were intact [Affect] : the affect was normal [FreeTextEntry1] : trace pitting edema up to lower shins

## 2019-05-28 NOTE — ASSESSMENT
[FreeTextEntry1] : 43yo obese white F  w suspected longstanding chronic HTN s/p term Csection c/b superimposed severe PEC requiring readmission for uncontrolled BP and Mg  here for f/u:\par \par Losing significant weight, change her diet. BPs have been very well controlled. Stop nifedipine 2/2 edema LE and low bps, keep labetalol 100mg BID. Cont her lifestyle changes and checking BP 3x/week. To call if persistently >140/90. If still needs an antihypertensive on f/u in 3 months will switch to a first line agent since she's not breast feeding. Cr wnl, u/a bland. 3m post partum with no signs/sx of PEC\par

## 2019-07-09 ENCOUNTER — APPOINTMENT (OUTPATIENT)
Dept: OBGYN | Facility: CLINIC | Age: 44
End: 2019-07-09
Payer: COMMERCIAL

## 2019-07-09 VITALS
DIASTOLIC BLOOD PRESSURE: 80 MMHG | WEIGHT: 250 LBS | BODY MASS INDEX: 42.68 KG/M2 | SYSTOLIC BLOOD PRESSURE: 125 MMHG | HEIGHT: 64 IN

## 2019-07-09 PROCEDURE — 99396 PREV VISIT EST AGE 40-64: CPT

## 2019-07-10 NOTE — DISCHARGE NOTE OB - DO NOT DIET OR “STARVE” YOURSELF INTO GETTING BACK TO PRE-PREGNANCY SHAPE
07/10/19    Past Medical History:   Diagnosis Date   • Anemia    • Aortic valve regurgitation    • Arthritis     generalized, spine   • Ascending aortic aneurysm (CMS/HCC)    • Bronchitis    • Cerebrovascular accident (CMS/HCC)     20 years ago   • Chronic pain     neck, knee's, shoulders arms arthritis   • Chronic renal impairment, stage 2 (mild) 10/9/2015   • Colon polyp    • Diverticulosis of colon    • Dysphagia    • Esophageal adenocarcinoma (CMS/HCC) 3/19/2019   • Fracture     cartilage of nose   • H/O esophagectomy 7/10/2019   • High cholesterol    • HTN (hypertension)    • Pneumonia     \"walking\" pneumonia   • S/P aorta repair 2/27/2019   • S/P AVR (aortic valve replacement) 2/27/2019   • S/P left atrial appendage ligation 2/27/2019   • S/P pericardial window creation 3/15/2019   • S/P robotic cholecystectomy 7/10/2019   • s/p robotic transhiatal esophagectomy with transcervical endoscopic esophageal mobilization  7/10/2019   • Sleep apnea     no CPAP   • 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, L arm   • Thyroid condition    • Urinary tract infection     per patient 1/24       Visit Vitals  /60 (BP Location: [facility], Patient Position: Semi-[facility])   Pulse 82   Temp 97.4 °F (36.3 °C) (Axillary)   Resp 20   Ht 6' 3\" (1.905 m)   Wt 125.6 kg   SpO2 97%   BMI 34.61 kg/m²       Recent Labs   Lab 07/10/19  0350   WBC 3.9*   RBC 2.45*   HGB 7.1*   HCT 23.7*   *       Recent Labs   Lab 07/10/19  0350 07/09/19 2054   SODIUM 144 144   POTASSIUM 4.0 3.8   CHLORIDE 112* 111*   CO2 27 28   BUN 13 14   CREATININE 0.97 1.09   GLUCOSE 82 96   CALCIUM 8.5 8.6       Full note to follow.    Nithin Fabian MD     Statement Selected

## 2019-07-26 LAB
C TRACH RRNA SPEC QL NAA+PROBE: NOT DETECTED
CYTOLOGY CVX/VAG DOC THIN PREP: ABNORMAL
HPV HIGH+LOW RISK DNA PNL CVX: DETECTED
N GONORRHOEA RRNA SPEC QL NAA+PROBE: NOT DETECTED
SOURCE TP AMPLIFICATION: NORMAL

## 2019-08-07 ENCOUNTER — APPOINTMENT (OUTPATIENT)
Dept: ENDOCRINOLOGY | Facility: CLINIC | Age: 44
End: 2019-08-07
Payer: COMMERCIAL

## 2019-08-07 VITALS
HEART RATE: 78 BPM | WEIGHT: 254 LBS | SYSTOLIC BLOOD PRESSURE: 190 MMHG | DIASTOLIC BLOOD PRESSURE: 126 MMHG | BODY MASS INDEX: 43.6 KG/M2

## 2019-08-07 LAB
GLUCOSE BLDC GLUCOMTR-MCNC: 208
HBA1C MFR BLD HPLC: 9.8

## 2019-08-07 PROCEDURE — 82962 GLUCOSE BLOOD TEST: CPT

## 2019-08-07 PROCEDURE — 99204 OFFICE O/P NEW MOD 45 MIN: CPT | Mod: 25,GC

## 2019-08-07 PROCEDURE — 83036 HEMOGLOBIN GLYCOSYLATED A1C: CPT | Mod: QW

## 2019-08-09 LAB
CHOLEST SERPL-MCNC: 308 MG/DL
CHOLEST/HDLC SERPL: 6 RATIO
HDLC SERPL-MCNC: 51 MG/DL
LDLC SERPL CALC-MCNC: 215 MG/DL
T4 FREE SERPL-MCNC: <0.1 NG/DL
TRIGL SERPL-MCNC: 208 MG/DL
TSH SERPL-ACNC: 283 UIU/ML

## 2019-08-14 NOTE — HISTORY OF PRESENT ILLNESS
[Postpartum Consultation] : postpartum consultation [Complications:___] : complications include: [unfilled] [Delivery Date: ___] : on [unfilled] [Primary C/S] : delivered by  section [Female] : Delivery History: baby girl [Wt. ___] : weighing [unfilled] [Abdominal Pain] : abdominal pain [Back Pain] : back pain [None] : No associated symptoms are reported [Breastfeeding] : not currently nursing [Breast Pain] : no breast pain [BF with Difficulty] : nursing without difficulty [BreastFeeding Problems] : no breastfeeding problems [Cracked Nipples] : no cracked nipples [Chest Pain] : no chest pain [S/Sx PP Depression] : no signs/symptoms of postpartum depression [Episiotomy Site Pain] : no episiotomy site pain [Heavy Bleeding] : no heavy bleeding [Incisional Drainage] : no incisional drainage [Incisional Pain] : no incisional pain [Irregular Bleeding] : no irregular bleeding [Leg Pain] : no leg pain [Shortness of Breath] : no shortness of breath [Suicidal Ideation] : no suicidal ideation [Vaginal Discharge] : no vaginal discharge [Chills] : no chills [Fatigue] : no fatigue [Dysuria] : no dysuria [Fever] : no fever [Headache] : no headache [Nausea] : no nausea [Vomiting] : no vomiting

## 2019-08-14 NOTE — HISTORY OF PRESENT ILLNESS
[Definite:  ___ (Date)] : the last menstrual period was [unfilled] [Normal Amount/Duration] : was of a normal amount and duration [Regular Cycle Intervals] : periods have been regular [Menarche Age: ____] : age at menarche was [unfilled] [Menstrual Cramps] : menstrual cramps [Sexually Active] : is sexually active [Spotting Between  Menses] : no spotting between menses [Currently In Menopause] : not currently in menopause [Experiencing Menopausal Sxs] : not experiencing menopausal symptoms

## 2019-09-04 ENCOUNTER — RX RENEWAL (OUTPATIENT)
Age: 44
End: 2019-09-04

## 2019-10-01 ENCOUNTER — APPOINTMENT (OUTPATIENT)
Dept: NEPHROLOGY | Facility: CLINIC | Age: 44
End: 2019-10-01

## 2019-10-07 ENCOUNTER — APPOINTMENT (OUTPATIENT)
Dept: ENDOCRINOLOGY | Facility: CLINIC | Age: 44
End: 2019-10-07
Payer: COMMERCIAL

## 2019-10-07 VITALS
HEART RATE: 62 BPM | SYSTOLIC BLOOD PRESSURE: 136 MMHG | WEIGHT: 251 LBS | DIASTOLIC BLOOD PRESSURE: 83 MMHG | BODY MASS INDEX: 42.85 KG/M2 | HEIGHT: 64 IN

## 2019-10-07 DIAGNOSIS — O09.529 SUPERVISION OF ELDERLY MULTIGRAVIDA, UNSPECIFIED TRIMESTER: ICD-10-CM

## 2019-10-07 LAB — GLUCOSE BLDC GLUCOMTR-MCNC: 158

## 2019-10-07 PROCEDURE — 82962 GLUCOSE BLOOD TEST: CPT

## 2019-10-07 PROCEDURE — 99214 OFFICE O/P EST MOD 30 MIN: CPT | Mod: 25

## 2019-10-08 LAB
ALBUMIN SERPL ELPH-MCNC: 4.1 G/DL
ALP BLD-CCNC: 43 U/L
ALT SERPL-CCNC: 24 U/L
ANION GAP SERPL CALC-SCNC: 13 MMOL/L
AST SERPL-CCNC: 24 U/L
BILIRUB SERPL-MCNC: 0.2 MG/DL
BUN SERPL-MCNC: 9 MG/DL
CALCIUM SERPL-MCNC: 9.3 MG/DL
CHLORIDE SERPL-SCNC: 101 MMOL/L
CO2 SERPL-SCNC: 24 MMOL/L
CREAT SERPL-MCNC: 0.59 MG/DL
ESTIMATED AVERAGE GLUCOSE: 235 MG/DL
GLUCOSE SERPL-MCNC: 177 MG/DL
HBA1C MFR BLD HPLC: 9.8 %
POTASSIUM SERPL-SCNC: 5.1 MMOL/L
PROT SERPL-MCNC: 7.1 G/DL
SODIUM SERPL-SCNC: 138 MMOL/L
T4 FREE SERPL-MCNC: 1.5 NG/DL
TSH SERPL-ACNC: 2.26 UIU/ML

## 2019-10-14 ENCOUNTER — APPOINTMENT (OUTPATIENT)
Dept: ENDOCRINOLOGY | Facility: CLINIC | Age: 44
End: 2019-10-14

## 2019-10-16 DIAGNOSIS — O24.111 PRE-EXISTING TYPE 2 DIABETES MELLITUS, IN PREGNANCY, FIRST TRIMESTER: ICD-10-CM

## 2019-10-16 DIAGNOSIS — E03.9 ENDOCRINE, NUTRITIONAL AND METABOLIC DISEASES COMPLICATING PREGNANCY, UNSPECIFIED TRIMESTER: ICD-10-CM

## 2019-10-16 DIAGNOSIS — O99.280 ENDOCRINE, NUTRITIONAL AND METABOLIC DISEASES COMPLICATING PREGNANCY, UNSPECIFIED TRIMESTER: ICD-10-CM

## 2019-10-16 DIAGNOSIS — I10 ESSENTIAL (PRIMARY) HYPERTENSION: ICD-10-CM

## 2019-10-16 DIAGNOSIS — O09.513 SUPERVISION OF ELDERLY PRIMIGRAVIDA, THIRD TRIMESTER: ICD-10-CM

## 2019-10-28 ENCOUNTER — RX CHANGE (OUTPATIENT)
Age: 44
End: 2019-10-28

## 2019-11-01 ENCOUNTER — RX RENEWAL (OUTPATIENT)
Age: 44
End: 2019-11-01

## 2020-01-08 ENCOUNTER — APPOINTMENT (OUTPATIENT)
Dept: ENDOCRINOLOGY | Facility: CLINIC | Age: 45
End: 2020-01-08

## 2020-02-28 ENCOUNTER — APPOINTMENT (OUTPATIENT)
Dept: ENDOCRINOLOGY | Facility: CLINIC | Age: 45
End: 2020-02-28

## 2020-07-14 ENCOUNTER — APPOINTMENT (OUTPATIENT)
Dept: OBGYN | Facility: CLINIC | Age: 45
End: 2020-07-14

## 2020-08-20 ENCOUNTER — APPOINTMENT (OUTPATIENT)
Dept: ENDOCRINOLOGY | Facility: CLINIC | Age: 45
End: 2020-08-20
Payer: COMMERCIAL

## 2020-08-20 VITALS
BODY MASS INDEX: 41.54 KG/M2 | SYSTOLIC BLOOD PRESSURE: 149 MMHG | HEART RATE: 92 BPM | DIASTOLIC BLOOD PRESSURE: 90 MMHG | WEIGHT: 242 LBS

## 2020-08-20 DIAGNOSIS — E03.9 HYPOTHYROIDISM, UNSPECIFIED: ICD-10-CM

## 2020-08-20 LAB
GLUCOSE BLDC GLUCOMTR-MCNC: 194
HBA1C MFR BLD HPLC: 10.9

## 2020-08-20 PROCEDURE — 36415 COLL VENOUS BLD VENIPUNCTURE: CPT

## 2020-08-20 PROCEDURE — 82962 GLUCOSE BLOOD TEST: CPT

## 2020-08-20 PROCEDURE — 83036 HEMOGLOBIN GLYCOSYLATED A1C: CPT | Mod: QW

## 2020-08-20 PROCEDURE — 99214 OFFICE O/P EST MOD 30 MIN: CPT | Mod: 25

## 2020-08-20 RX ORDER — PNV/FERROUS SULFATE/FOLIC ACID 27-<0.5MG
TABLET ORAL
Refills: 0 | Status: COMPLETED | COMMUNITY
End: 2020-08-20

## 2020-08-20 RX ORDER — LEVOTHYROXINE SODIUM 0.11 MG/1
112 TABLET ORAL
Qty: 2 | Refills: 0 | Status: COMPLETED | COMMUNITY
End: 2020-08-20

## 2020-08-20 RX ORDER — DULAGLUTIDE 1.5 MG/.5ML
1.5 INJECTION, SOLUTION SUBCUTANEOUS
Qty: 3 | Refills: 3 | Status: ACTIVE | COMMUNITY
Start: 2020-08-20 | End: 1900-01-01

## 2020-08-20 RX ORDER — METFORMIN ER 500 MG 500 MG/1
500 TABLET ORAL
Qty: 2 | Refills: 5 | Status: COMPLETED | COMMUNITY
Start: 2019-08-07 | End: 2020-08-20

## 2020-08-20 RX ORDER — METFORMIN ER 500 MG 500 MG/1
500 TABLET ORAL TWICE DAILY
Qty: 360 | Refills: 1 | Status: ACTIVE | COMMUNITY
Start: 2019-10-18 | End: 1900-01-01

## 2020-08-24 ENCOUNTER — APPOINTMENT (OUTPATIENT)
Dept: ENDOCRINOLOGY | Facility: CLINIC | Age: 45
End: 2020-08-24
Payer: COMMERCIAL

## 2020-08-24 ENCOUNTER — TRANSCRIPTION ENCOUNTER (OUTPATIENT)
Age: 45
End: 2020-08-24

## 2020-08-24 PROCEDURE — 97802 MEDICAL NUTRITION INDIV IN: CPT

## 2020-08-25 ENCOUNTER — TRANSCRIPTION ENCOUNTER (OUTPATIENT)
Age: 45
End: 2020-08-25

## 2020-08-25 LAB
ALBUMIN SERPL ELPH-MCNC: 4.5 G/DL
ALP BLD-CCNC: 52 U/L
ALT SERPL-CCNC: 92 U/L
ANION GAP SERPL CALC-SCNC: 13 MMOL/L
AST SERPL-CCNC: 92 U/L
BASOPHILS # BLD AUTO: 0.06 K/UL
BASOPHILS NFR BLD AUTO: 0.5 %
BILIRUB SERPL-MCNC: 0.2 MG/DL
BUN SERPL-MCNC: 14 MG/DL
CALCIUM SERPL-MCNC: 9.7 MG/DL
CHLORIDE SERPL-SCNC: 103 MMOL/L
CHOLEST SERPL-MCNC: 221 MG/DL
CHOLEST/HDLC SERPL: 4.2 RATIO
CO2 SERPL-SCNC: 23 MMOL/L
CREAT SERPL-MCNC: 0.66 MG/DL
CREAT SPEC-SCNC: 167 MG/DL
EOSINOPHIL # BLD AUTO: 0.2 K/UL
EOSINOPHIL NFR BLD AUTO: 1.7 %
ESTIMATED AVERAGE GLUCOSE: 280 MG/DL
GLUCOSE SERPL-MCNC: 196 MG/DL
HBA1C MFR BLD HPLC: 11.4 %
HCT VFR BLD CALC: 41.5 %
HDLC SERPL-MCNC: 52 MG/DL
HGB BLD-MCNC: 13.1 G/DL
IMM GRANULOCYTES NFR BLD AUTO: 0.3 %
LDLC SERPL CALC-MCNC: 147 MG/DL
LYMPHOCYTES # BLD AUTO: 4.01 K/UL
LYMPHOCYTES NFR BLD AUTO: 33.5 %
MAN DIFF?: NORMAL
MCHC RBC-ENTMCNC: 28.7 PG
MCHC RBC-ENTMCNC: 31.6 GM/DL
MCV RBC AUTO: 91 FL
MICROALBUMIN 24H UR DL<=1MG/L-MCNC: 2.4 MG/DL
MICROALBUMIN/CREAT 24H UR-RTO: 14 MG/G
MONOCYTES # BLD AUTO: 0.86 K/UL
MONOCYTES NFR BLD AUTO: 7.2 %
NEUTROPHILS # BLD AUTO: 6.79 K/UL
NEUTROPHILS NFR BLD AUTO: 56.8 %
PLATELET # BLD AUTO: 248 K/UL
POTASSIUM SERPL-SCNC: 4.3 MMOL/L
PROT SERPL-MCNC: 7.6 G/DL
RBC # BLD: 4.56 M/UL
RBC # FLD: 14.7 %
SODIUM SERPL-SCNC: 139 MMOL/L
T3 SERPL-MCNC: 92 NG/DL
T4 FREE SERPL-MCNC: 1.3 NG/DL
TRIGL SERPL-MCNC: 110 MG/DL
TSH SERPL-ACNC: 14.3 UIU/ML
WBC # FLD AUTO: 11.96 K/UL

## 2020-08-25 NOTE — ASSESSMENT
[Carbohydrate Consistent Diet] : carbohydrate consistent diet [Importance of Diet and Exercise] : importance of diet and exercise to improve glycemic control, achieve weight loss and improve cardiovascular health [Self Monitoring of Blood Glucose] : self monitoring of blood glucose [Retinopathy Screening] : Patient was referred to ophthalmology for retinopathy screening [FreeTextEntry1] : 45 year old female with PMH of morbid obesity, gestational DM2 and now with uncontrolled, uncomplicated type 2 diabetes, and hypothyroidism.\par \par 1. Hypothyroidism \par -Continue 112mcg x 2 once daily in AM \par -Repeat TFTs today and adjust as needed.\par \par 2. Diabetes - worsening control as evidenced by A1C 10.9%\par -Restart metformin 500mg ER BID and increase to 2 tabs BID after a few days if tolerated.\par - Re-start Trulicity 0.75mg weekly. Explained MOA and drug class of GLP-1. Explained that it will be increased at pharmacy to 1.5mg weekly in 1 month.\par - Would consider adding SGLT-2 at next visit if not at goal.\par -Needs to check blood glucose 3x daily. Will send me a message with which meter she uses.\par -Explained that keto diet is not usually sustainable, and that increase in protein and fats can be hard on kidneys and increase cholesterol. Discussed balanced diet with appropriate starch serving sizes and intermittent fasting. Advised to make a televideo appt. with RD to discuss further.\par \par 3. Hyperlipidemia\par -Pending lipid panel results, start atorvastatin 20mg daily. Discussed RAB and possible SE. Will titrate up pending toleration.\par \par Patient verbalized understanding of the above. All questions were answered to patient's satisfaction.\par RTO 3 months with Dr Flores.\par

## 2020-08-25 NOTE — HISTORY OF PRESENT ILLNESS
[FreeTextEntry1] : 46 y/o F w/ Hx of pre diabetes and gestational DM2, Obesity and hypothyroidism. No changes to health or medications since last visit. She is feeling well. She is [facility] and is the only caregiver to small child. \par \par Hypothyroidism:\par She is now taking 112 mcg 2 tablets daily. Pt denies hypothyroid symptoms. She is taking in the AM on an empty stomach and has not missed doses.\par \par 8/7/2019:\par , free T4 <0.1\par 10/7/2019:\par TSH 2.26, free T4 1.5\par \par Diabetes:\par Diagnosed approx 8 years ago as prediabetic. Initially started on metformin but couldn't tolerate pill size. Placed on Victoza for 1 year in 2017 with subsequent 80lbs weight loss and normalization of BG. Was not on any antidiabetes medication again until pregnancy. She was on Levemir 55 BID and humalog TID during pregnancy. Not discharged on any medications. \par 8/7/19: A1C 9.8%. Was not taking any medications at this time. Was started on metformin ER 500mg BID and Trulicity 0.75mg weekly.\par 10/7/19: A1C 9.8%. We increased metformin to 1000mg BID. Started Trulicity 0.75mg weekly with plan to titrate up if tolerating.\par \par Today A1C 10.9% and . She has been out of metformin for 2 weeks, and has not had Trulicity for a while.\par She is out of diabetes test strips.\par \par Diet: She is currently doing a keto diet. No soda or juice. \par Exercise: park on the weekends, but limited due to covid precautions.\par \par Urine micro: 3/2019 wnl\par Lipid profile: august 2019, , , HDL 51, . Advised starting atorvastatin 20mg, but she never took.\par BP: Above goal today - 149/90. Is not currently taking any HTN meds. Has to reschedule her cardiology appt.\par Ophthalmology: august 2019, wnl \par Diabetic foot exam: wnl 8/2019. No neuropathic symptoms. \par

## 2020-08-25 NOTE — ADDENDUM
[FreeTextEntry1] : 8/25/2020 AL\par TSH is slightly elevated, but  T4 and T3 are normal. Please make sure you are taking in the morning on empty stomach with no missed doses. Will keep same levothyroxine dose and repeat labs in November.\par Cholesterol is elevated. Please start taking atorvastatin 20mg daily at bedtime. No grapefruit juice. I will send to your pharmacy.\par Liver function test slightly elevated (AST/ALT). Will continue to monitor.

## 2020-08-25 NOTE — PHYSICAL EXAM
[Alert] : alert [Healthy Appearance] : healthy appearance [Well Nourished] : well nourished [Obese] : obese [No Acute Distress] : no acute distress [Well Developed] : well developed [Normal Sclera/Conjunctiva] : normal sclera/conjunctiva [Normal Voice/Communication] : normal voice communication [EOMI] : extra ocular movement intact [No Proptosis] : no proptosis [No Lid Lag] : no lid lag [No Respiratory Distress] : no respiratory distress [No Accessory Muscle Use] : no accessory muscle use [Normal Rate and Effort] : normal respiratory rate and effort [Normal Rate] : heart rate was normal [Regular Rhythm] : with a regular rhythm [No Edema] : no peripheral edema [Normal Anterior Cervical Nodes] : no anterior cervical lymphadenopathy [Not Distended] : not distended [Normal Posterior Cervical Nodes] : no posterior cervical lymphadenopathy [Spine Straight] : spine straight [No Spinal Tenderness] : no spinal tenderness [No Stigmata of Cushings Syndrome] : no stigmata of Cushings Syndrome [Normal Gait] : normal gait [No Involuntary Movements] : no involuntary movements were seen [Normal Strength/Tone] : muscle strength and tone were normal [No Skin Lesions] : no skin lesions [No Rash] : no rash [Oriented x3] : oriented to person, place, and time [No Tremors] : no tremors [Normal Mood] : the mood was normal [Normal Insight/Judgement] : insight and judgment were intact [Normal Affect] : the affect was normal [Acanthosis Nigricans] : no acanthosis nigricans [Foot Ulcers] : no foot ulcers

## 2020-08-26 ENCOUNTER — TRANSCRIPTION ENCOUNTER (OUTPATIENT)
Age: 45
End: 2020-08-26

## 2020-11-03 ENCOUNTER — TRANSCRIPTION ENCOUNTER (OUTPATIENT)
Age: 45
End: 2020-11-03

## 2020-11-19 ENCOUNTER — APPOINTMENT (OUTPATIENT)
Dept: ENDOCRINOLOGY | Facility: CLINIC | Age: 45
End: 2020-11-19
Payer: COMMERCIAL

## 2020-11-19 DIAGNOSIS — E78.5 TYPE 2 DIABETES MELLITUS WITH OTHER SPECIFIED COMPLICATION: ICD-10-CM

## 2020-11-19 DIAGNOSIS — E11.9 TYPE 2 DIABETES MELLITUS W/OUT COMPLICATIONS: ICD-10-CM

## 2020-11-19 DIAGNOSIS — E11.69 TYPE 2 DIABETES MELLITUS WITH OTHER SPECIFIED COMPLICATION: ICD-10-CM

## 2020-11-19 PROCEDURE — 99215 OFFICE O/P EST HI 40 MIN: CPT | Mod: 95

## 2020-11-19 RX ORDER — FLASH GLUCOSE SCANNING READER
EACH MISCELLANEOUS
Qty: 1 | Refills: 0 | Status: COMPLETED | COMMUNITY
Start: 2020-11-19 | End: 2021-02-17

## 2020-11-19 RX ORDER — LABETALOL HYDROCHLORIDE 100 MG/1
100 TABLET, FILM COATED ORAL
Qty: 60 | Refills: 3 | Status: DISCONTINUED | COMMUNITY
Start: 2019-05-23 | End: 2020-11-19

## 2020-11-19 RX ORDER — BLOOD-GLUCOSE METER
W/DEVICE EACH MISCELLANEOUS
Qty: 1 | Refills: 0 | Status: DISCONTINUED | COMMUNITY
Start: 2018-10-24 | End: 2020-11-19

## 2020-11-19 RX ORDER — DULAGLUTIDE 0.75 MG/.5ML
0.75 INJECTION, SOLUTION SUBCUTANEOUS
Qty: 1 | Refills: 0 | Status: DISCONTINUED | COMMUNITY
Start: 2019-08-07 | End: 2020-11-19

## 2020-11-19 RX ORDER — FLASH GLUCOSE SENSOR
KIT MISCELLANEOUS
Qty: 6 | Refills: 3 | Status: ACTIVE | COMMUNITY
Start: 2020-11-19 | End: 1900-01-01

## 2020-11-19 RX ORDER — LANCETS 33 GAUGE
EACH MISCELLANEOUS
Qty: 4 | Refills: 3 | Status: ACTIVE | COMMUNITY
Start: 2020-08-20 | End: 1900-01-01

## 2020-11-19 RX ORDER — BLOOD SUGAR DIAGNOSTIC
STRIP MISCELLANEOUS
Qty: 360 | Refills: 3 | Status: ACTIVE | COMMUNITY
Start: 2020-08-20 | End: 1900-01-01

## 2020-11-19 NOTE — HISTORY OF PRESENT ILLNESS
[Home] : at home, [unfilled] , at the time of the visit. [Medical Office: ([facility])___] : at the medical office located in  [Verbal consent obtained from patient] : the patient, [unfilled] [FreeTextEntry1] : Ms. Nunez is a 45 year-old woman with a history of type 2 diabetes mellitus, hyperlipidemia, hypothyroidism, elevated body mass index presenting for follow-up of her endocrine issues. She has been followed by Ellie Mcneal NP, last in August 2020.\par \par Type 2 diabetes mellitus. HbA1c 11.4% in August 2020, 9.8% in August 2019. No known history of micro- or macrovascular complications.\par She was diagnosed with diabetes in 2019; previously prediabetes and gestational diabetes. \par She is currently taking metformin 1000 mg twice daily and Trulicity 1.5 mg weekly; she had been off therapy at the time of her last appointment. \par She is checking blood sugars four times daily. Fasting blood sugars 90-120s mg/dL. Postprandial blood sugars up to 150s mg/dL. No recent hypoglycemia.\par She is not on a blood pressure regimen\par She is on a statin for cholesterol\par Nephropathy screening: Urine microalbumin within range in August 2020\par Last ophthalmology appointment: Over a year\par Last dental appointment: Over six months\par \par Hypothyroidism.\par She was diagnosed with hypothyroidism many years ago.\par She is taking levothyroxine 224 mcg daily (112 mcg x 2). \par She is taking levothyroxine in the morning, on an empty stomach, with plain water, and waiting at least 30 minutes before eating. She is not taking calcium/iron/multivitamin. \par No history of radiation exposure.\par No family history of thyroid disease. \par \par No chest pain, shortness of breath, polyuria/polydipsia, lower extremity numbness/tingling. No dysphagia, fixed/hard neck mass, orthopnea. No palpitations, diarrhea/constipation, hair/skin changes, depression/anxiety. Menses are regular.

## 2020-11-19 NOTE — ASSESSMENT
[FreeTextEntry1] : Type 2 diabetes mellitus/elevated body mass index. HbA1c 11.4% in August 2020, 9.8% in August 2019. No known history of micro- or macrovascular complications. I congratulated her on her overall improvement in glycemic control per recent blood sugars. We discussed the cardiovascular and microvascular complications of uncontrolled diabetes. We discussed the importance of diet and exercise and lifestyle modification for glycemic control and weight loss. We discussed follow-up with nutrition. We discussed pharmacologic options for glycemic control and weight loss. She is tolerating her current regimen and we will continue pending laboratory testing.\par Check complete blood count, comprehensive metabolic panel, lipid panel, urine microalbumin, vitamin B12, 25-hydroxyvitamin D  \par Continue metformin 1000 mg twice daily\par Continue Trulicity 1.5 mg weekly\par Check blood sugars four times daily; will investigate coverage for the FreeStyle [place]\par She is not on a blood pressure regimen; last blood pressure around goal\par She is on a statin for cholesterol; will need to repeat after initiation of statin therapy\par Nephropathy screening: Urine microalbumin within range in August 2020\par Last ophthalmology appointment: Over a year; advised appointment when appropriate\par Last dental appointment: Over six months; advised appointment when appropriate\par \par Hypothyroidism. We reviewed proper use and compliance with levothyroxine. \par Continue levothyroxine 224 mcg daily pending TSH level\par \par Request for continuous glucose monitoring:\par This patient will benefit from continuous glucose monitoring for management of their diabetes mellitus. \par This patient has been using a home blood glucose monitor and performing blood glucose monitoring four or more times daily. \par This patient has been injecting insulin four times a day and will be able to adjust their insulin regimen on the basis of the continuous glucose monitoring results. \par \par Return to see Ellie Mcneal NP and Horace Cook RD in 3 months.

## 2020-12-10 ENCOUNTER — TRANSCRIPTION ENCOUNTER (OUTPATIENT)
Age: 45
End: 2020-12-10

## 2020-12-10 ENCOUNTER — NON-APPOINTMENT (OUTPATIENT)
Age: 45
End: 2020-12-10

## 2020-12-10 RX ORDER — BLOOD-GLUCOSE METER
W/DEVICE KIT MISCELLANEOUS
Qty: 1 | Refills: 0 | Status: COMPLETED | COMMUNITY
Start: 2020-12-10 | End: 2021-03-10

## 2021-02-18 RX ORDER — ATORVASTATIN CALCIUM 20 MG/1
20 TABLET, FILM COATED ORAL
Qty: 90 | Refills: 1 | Status: ACTIVE | COMMUNITY
Start: 2019-10-16 | End: 1900-01-01

## 2021-05-12 ENCOUNTER — RX RENEWAL (OUTPATIENT)
Age: 46
End: 2021-05-12

## 2021-05-12 RX ORDER — LEVOTHYROXINE SODIUM 0.11 MG/1
112 TABLET ORAL
Qty: 180 | Refills: 0 | Status: ACTIVE | COMMUNITY
Start: 2019-11-01 | End: 1900-01-01

## 2022-03-15 NOTE — DISCHARGE NOTE OB - IF BREASTFEEDING, ADD A TOTAL OF 500 EXTRA CALORIES EACH DAY
CT Heart Calcium report reviewed with patient. Total coronary calcium score of 193 , 50 th percentile for age, gender, race/ethnicity. Patient is having no symptoms. Discussed healthy heart lifestyle and risk factor modification. Instructed to follow up with PCP.  Patient verbalized understanding, all questions and concerns answered. Copy of report, CT Calcium Screening results letter and Simple 7 patient education materials mailed to patient.      Also noted was incidental findings on the CT heart Calcium report. (Ectasia of Ascending Thoracic Aorta)  Patient instructed to follow up with PCP on these findings. Verbalizes Understanding.  Results letter includes instructions to follow up with your primary care doctor regarding these findings.  To Ricardo Patel, PCP      
Statement Selected

## 2023-06-19 NOTE — ED ADULT TRIAGE NOTE - TEMPERATURE IN CELSIUS (DEGREES C)
Nayely Pizarro is here at 33w1d for:    Chief Complaint   Patient presents with    Routine Prenatal Visit     Patient here for routine prenatal appointment with [facility]. Patient states she is having cramping on the left side that comes and goes every few minutes and its just the left side. Estimated Due Date: Estimated Date of Delivery: 23    OB History    Para Term  AB Living   2 1 1     1   SAB IAB Ectopic Molar Multiple Live Births           0 1      # Outcome Date GA Lbr Ahsan/2nd Weight Sex Delivery Anes PTL Lv   2 Current            1 Term 20 39w1d / 00:55 8 lb 1.3 oz (3.665 kg) M Vag-Spont EPI N ELKIN        Past Medical History:   Diagnosis Date    History of cocaine use        History reviewed. No pertinent surgical history. Social History     Tobacco Use   Smoking Status Every Day    Packs/day: 0.50    Types: Cigarettes   Smokeless Tobacco Never        Social History     Substance and Sexual Activity   Alcohol Use Not Currently       No results found for this visit on 23. HPI: pt here today states she is doing well but having a pain off and on on left side. Yes PT denies fever, chills, nausea and vomiting       Vitals:  Estimated body mass index is 34.19 kg/m² as calculated from the following:    Height as of 23: 5' 3\" (1.6 m). Weight as of this encounter: 193 lb (87.5 kg). BP: 112/62  Weight - Scale: 193 lb (87.5 kg)  Patient Position: Sitting  Albumin: Negative  Glucose: Negative  Fetal HR: 150 nst  Movement: Present           Abdomen: soft    Results reviewed today:    NSt reactive        ASSESSMENT & Plan    Diagnosis Orders   1. 33 weeks gestation of pregnancy  DC FETAL NONSTRESS TEST      2. Late prenatal care  DC FETAL NONSTRESS TEST      3. High risk pregnancy, antepartum  DC FETAL NONSTRESS TEST      4. Cocaine use complicating pregnancy in first trimester  DC FETAL NONSTRESS TEST      5.  Pain of round ligament affecting pregnancy, 36.9

## 2023-08-02 ENCOUNTER — NON-APPOINTMENT (OUTPATIENT)
Age: 48
End: 2023-08-02

## 2023-08-16 ENCOUNTER — APPOINTMENT (OUTPATIENT)
Dept: OBGYN | Facility: CLINIC | Age: 48
End: 2023-08-16
Payer: COMMERCIAL

## 2023-08-16 ENCOUNTER — LABORATORY RESULT (OUTPATIENT)
Age: 48
End: 2023-08-16

## 2023-08-16 VITALS
OXYGEN SATURATION: 98 % | SYSTOLIC BLOOD PRESSURE: 139 MMHG | BODY MASS INDEX: 41.37 KG/M2 | WEIGHT: 241 LBS | HEART RATE: 91 BPM | DIASTOLIC BLOOD PRESSURE: 86 MMHG

## 2023-08-16 DIAGNOSIS — Z01.419 ENCOUNTER FOR GYNECOLOGICAL EXAMINATION (GENERAL) (ROUTINE) W/OUT ABNORMAL FINDINGS: ICD-10-CM

## 2023-08-16 PROCEDURE — 99396 PREV VISIT EST AGE 40-64: CPT

## 2023-08-16 NOTE — HISTORY OF PRESENT ILLNESS
[No] : Patient does not have concerns regarding sex [Previously active] : previously active [Patient reported mammogram was normal] : Patient reported mammogram was normal [Patient reported PAP Smear was normal] : Patient reported PAP Smear was normal [Mammogramdate] : 08/2022 [PapSmeardate] : 07/09/2019 [FreeTextEntry1] : 07/24/2023

## 2023-08-18 ENCOUNTER — NON-APPOINTMENT (OUTPATIENT)
Age: 48
End: 2023-08-18

## 2023-08-18 LAB — HPV HIGH+LOW RISK DNA PNL CVX: DETECTED

## 2023-08-22 ENCOUNTER — TRANSCRIPTION ENCOUNTER (OUTPATIENT)
Age: 48
End: 2023-08-22

## 2023-08-22 ENCOUNTER — NON-APPOINTMENT (OUTPATIENT)
Age: 48
End: 2023-08-22

## 2023-08-22 LAB — CYTOLOGY CVX/VAG DOC THIN PREP: ABNORMAL

## 2023-09-18 PROBLEM — Z01.419 NORMAL GYNECOLOGIC EXAMINATION: Status: ACTIVE | Noted: 2019-07-09

## 2023-10-31 NOTE — ED ADULT NURSE NOTE - DISCHARGE DATE/TIME
Multiple issues with IV need to repeat blood draws for monitoring of her labs,   Discussed with IV resource   place a consult for PICC line on her right arm  Unfortunately the patient is with advanced renal failure requiring dialysis and will likely need future access placement.  We will consider left upper extremity nondominant arm for future access placement for now with no available vein mapping  Please call with any question    Keri Finn MD  [facility]  [phone]     02-Oct-2018 23:03

## 2023-11-22 ENCOUNTER — APPOINTMENT (OUTPATIENT)
Dept: OBGYN | Facility: CLINIC | Age: 48
End: 2023-11-22